# Patient Record
Sex: FEMALE | Race: WHITE | NOT HISPANIC OR LATINO | Employment: OTHER | URBAN - METROPOLITAN AREA
[De-identification: names, ages, dates, MRNs, and addresses within clinical notes are randomized per-mention and may not be internally consistent; named-entity substitution may affect disease eponyms.]

---

## 2017-01-06 ENCOUNTER — GENERIC CONVERSION - ENCOUNTER (OUTPATIENT)
Dept: OTHER | Facility: OTHER | Age: 52
End: 2017-01-06

## 2017-02-13 PROCEDURE — G0143 SCR C/V CYTO,THINLAYER,RESCR: HCPCS | Performed by: OBSTETRICS & GYNECOLOGY

## 2017-02-14 ENCOUNTER — LAB REQUISITION (OUTPATIENT)
Dept: LAB | Facility: HOSPITAL | Age: 52
End: 2017-02-14
Payer: COMMERCIAL

## 2017-02-14 DIAGNOSIS — Z01.419 ENCOUNTER FOR GYNECOLOGICAL EXAMINATION WITHOUT ABNORMAL FINDING: ICD-10-CM

## 2017-02-14 DIAGNOSIS — Z11.51 ENCOUNTER FOR SCREENING FOR HUMAN PAPILLOMAVIRUS (HPV): ICD-10-CM

## 2017-02-17 LAB
LAB AP GYN PRIMARY INTERPRETATION: NORMAL
LAB AP LMP: NORMAL
Lab: NORMAL

## 2017-08-08 ENCOUNTER — APPOINTMENT (OUTPATIENT)
Dept: RADIOLOGY | Age: 52
End: 2017-08-08
Payer: COMMERCIAL

## 2017-08-08 ENCOUNTER — ALLSCRIPTS OFFICE VISIT (OUTPATIENT)
Dept: OTHER | Facility: OTHER | Age: 52
End: 2017-08-08

## 2017-08-08 ENCOUNTER — TRANSCRIBE ORDERS (OUTPATIENT)
Dept: ADMINISTRATIVE | Age: 52
End: 2017-08-08

## 2017-08-08 DIAGNOSIS — M25.561 PAIN IN RIGHT KNEE: ICD-10-CM

## 2017-08-08 DIAGNOSIS — R63.5 ABNORMAL WEIGHT GAIN: ICD-10-CM

## 2017-08-08 DIAGNOSIS — R53.83 OTHER FATIGUE: ICD-10-CM

## 2017-08-08 PROCEDURE — 73564 X-RAY EXAM KNEE 4 OR MORE: CPT

## 2017-09-06 ENCOUNTER — ALLSCRIPTS OFFICE VISIT (OUTPATIENT)
Dept: OTHER | Facility: OTHER | Age: 52
End: 2017-09-06

## 2017-09-09 ENCOUNTER — APPOINTMENT (OUTPATIENT)
Dept: LAB | Facility: MEDICAL CENTER | Age: 52
End: 2017-09-09
Payer: COMMERCIAL

## 2017-09-09 DIAGNOSIS — R53.83 OTHER FATIGUE: ICD-10-CM

## 2017-09-09 DIAGNOSIS — R63.5 ABNORMAL WEIGHT GAIN: ICD-10-CM

## 2017-09-09 LAB
ALBUMIN SERPL BCP-MCNC: 3.8 G/DL (ref 3.5–5)
ALP SERPL-CCNC: 66 U/L (ref 46–116)
ALT SERPL W P-5'-P-CCNC: 19 U/L (ref 12–78)
ANION GAP SERPL CALCULATED.3IONS-SCNC: 4 MMOL/L (ref 4–13)
AST SERPL W P-5'-P-CCNC: 21 U/L (ref 5–45)
BASOPHILS # BLD AUTO: 0.05 THOUSANDS/ΜL (ref 0–0.1)
BASOPHILS NFR BLD AUTO: 1 % (ref 0–1)
BILIRUB SERPL-MCNC: 0.46 MG/DL (ref 0.2–1)
BUN SERPL-MCNC: 16 MG/DL (ref 5–25)
CALCIUM SERPL-MCNC: 9.2 MG/DL (ref 8.3–10.1)
CHLORIDE SERPL-SCNC: 106 MMOL/L (ref 100–108)
CHOLEST SERPL-MCNC: 227 MG/DL (ref 50–200)
CO2 SERPL-SCNC: 29 MMOL/L (ref 21–32)
CREAT SERPL-MCNC: 0.75 MG/DL (ref 0.6–1.3)
EOSINOPHIL # BLD AUTO: 0.25 THOUSAND/ΜL (ref 0–0.61)
EOSINOPHIL NFR BLD AUTO: 5 % (ref 0–6)
ERYTHROCYTE [DISTWIDTH] IN BLOOD BY AUTOMATED COUNT: 12.6 % (ref 11.6–15.1)
GFR SERPL CREATININE-BSD FRML MDRD: 93 ML/MIN/1.73SQ M
GLUCOSE P FAST SERPL-MCNC: 91 MG/DL (ref 65–99)
HCT VFR BLD AUTO: 40.9 % (ref 34.8–46.1)
HDLC SERPL-MCNC: 70 MG/DL (ref 40–60)
HGB BLD-MCNC: 13.5 G/DL (ref 11.5–15.4)
LDLC SERPL CALC-MCNC: 137 MG/DL (ref 0–100)
LYMPHOCYTES # BLD AUTO: 1.67 THOUSANDS/ΜL (ref 0.6–4.47)
LYMPHOCYTES NFR BLD AUTO: 32 % (ref 14–44)
MCH RBC QN AUTO: 31.6 PG (ref 26.8–34.3)
MCHC RBC AUTO-ENTMCNC: 33 G/DL (ref 31.4–37.4)
MCV RBC AUTO: 96 FL (ref 82–98)
MONOCYTES # BLD AUTO: 0.45 THOUSAND/ΜL (ref 0.17–1.22)
MONOCYTES NFR BLD AUTO: 9 % (ref 4–12)
NEUTROPHILS # BLD AUTO: 2.77 THOUSANDS/ΜL (ref 1.85–7.62)
NEUTS SEG NFR BLD AUTO: 53 % (ref 43–75)
NRBC BLD AUTO-RTO: 0 /100 WBCS
PLATELET # BLD AUTO: 287 THOUSANDS/UL (ref 149–390)
PMV BLD AUTO: 9.4 FL (ref 8.9–12.7)
POTASSIUM SERPL-SCNC: 4.3 MMOL/L (ref 3.5–5.3)
PROT SERPL-MCNC: 7.4 G/DL (ref 6.4–8.2)
RBC # BLD AUTO: 4.27 MILLION/UL (ref 3.81–5.12)
SODIUM SERPL-SCNC: 139 MMOL/L (ref 136–145)
T4 FREE SERPL-MCNC: 0.76 NG/DL (ref 0.76–1.46)
TRIGL SERPL-MCNC: 99 MG/DL
TSH SERPL DL<=0.05 MIU/L-ACNC: 0.83 UIU/ML (ref 0.36–3.74)
WBC # BLD AUTO: 5.21 THOUSAND/UL (ref 4.31–10.16)

## 2017-09-09 PROCEDURE — 84439 ASSAY OF FREE THYROXINE: CPT

## 2017-09-09 PROCEDURE — 80061 LIPID PANEL: CPT

## 2017-09-09 PROCEDURE — 85025 COMPLETE CBC W/AUTO DIFF WBC: CPT

## 2017-09-09 PROCEDURE — 80053 COMPREHEN METABOLIC PANEL: CPT

## 2017-09-09 PROCEDURE — 84443 ASSAY THYROID STIM HORMONE: CPT

## 2017-09-09 PROCEDURE — 36415 COLL VENOUS BLD VENIPUNCTURE: CPT

## 2017-10-25 NOTE — PROGRESS NOTES
Assessment  1  Hot flashes (782 62) (R23 2)   2  Perineal irritation (709 9) (L98 9)   3  Perimenopause (627 2) (N95 1)    Plan  Hot flashes, Vaginal dryness    · Norethindrone-Eth Estradiol 0 5-2 5 MG-MCG Oral Tablet (Femhrt Low Dose); TAKE  1 TABLET DAILY AS DIRECTED   Rx By: Kevon Kent; Dispense: 28 Days ; #:28 Tablet; Refill: 2;For: Hot flashes, Vaginal dryness; RAYNA = N; Verified Transmission to [address] [place] [address]; Last Updated By: System, Auto Secure; 2017 1:32:02 PM  Other fatigue, Weight gain    · (1) CBC/PLT/DIFF; Status:Active; Requested [id number];    Perform:[facility]; XYY:[id number]; Ordered;For:Other fatigue, Weight gain; Ordered By:Brittney Green;   · (1) COMPREHENSIVE METABOLIC PANEL; Status:Active; Requested AWO:[id number];    Perform:[facility]; [id number]; Ordered;For:Other fatigue, Weight gain; Ordered By:Brittney Green;   · (1) LIPID PANEL, FASTING; Status:Active; Requested JENNY:[id number];    Perform:[facility]; QOF:[id number]; Ordered;For:Other fatigue, Weight gain; Ordered By:Brittney Green;   · (1) T4, FREE; Status:Active; Requested BKC:[id number];    Perform:[facility]; MPX:[id number]; Ordered;For:Other fatigue, Weight gain; Ordered By:Brittney Green;   · (1) TSH; Status:Active; Requested NMZ:[id number];    Perform:[facility]; MZQ:[id number]; Ordered;For:Other fatigue, Weight gain; Ordered By:Brittney Green; Perineal irritation    · Clobetasol Propionate 0 05 % External Ointment; APPLY AND GENTLY MASSAGE  INTO AFFECTED AREA(S) TWICE DAILY   Rx By: Lund Chess; Dispense: 30 Days ; #:1 X 45 GM Tube; Refill: 2;For: Perineal irritation; RAYNA = N; Print Rx    Discussion/Summary  Discussion Summary:   Reviewed options with patient including OTC remedies, dietary changes, and HRT  Patient most interested in HRT   Reviewed clotting risk and signs and symptoms of pulmonary embolism, deep vein thrombosis, myocardial infarction, and stroke  Reviewed risks of breast cancer with patient  Reviewed recommendation of starting lowest dosage for shortest duration possible  Will plan to start Femhrt daily  Will RTO in 3 months to see how she is tolerating  Call office sooner if having any issues  HRT will help with vaginal dryness but needs to allow 3 months for tissues to rebuild to notice difference can use OTC lubricants or coconut oil in meantime  Will have routine blood work done as well  with patient difficult to diagnose anal and perineal itching without doing an exam, patient declines  Reviewed common causes, can trial Clobetasol ointment to see if relieves  Script printed if patient would like to try  Recommend follow up with proctologist or colorectal if continues  Counseling Documentation With Imm: The patient was counseled regarding instructions for management,-- risks and benefits of treatment options,-- importance of compliance with treatment  total time of encounter was 30 minutes-- and-- 20 minutes was spent counseling  GYN Discussion and Summary:           Menopause--  Goals and Barriers: The patient has the current Goals: Start [address] work done  The patent has the current Barriers: Hot flashes, vaginal dryness, weight gain  Patient's Capacity to Self-Care: Patient is able to Self-Care  Medication SE Review and Pt Understands Tx: Possible side effects of new medications were reviewed with the patient/guardian today  The treatment plan was reviewed with the patient/guardian  The patient/guardian understands and agrees with the treatment plan   Self Referrals:   Self Referrals: Yes      History of Present Illness  HPI: 49 yo seen for menopause symptoms  LMP 2/2017 reports prior to that did not have menses for 7 months  Patient had minor spotting this past summer  Reports hot flashes have increased significantly, mostly at night, occurring daily   Patient has tried OTC remedies such as black cohash without relief  Also experiencing weight gain, patient is very active and has been very frustrated that she continues to gain weight  Reports vaginal dryness and pain with intercourse over the last year, has been using KY jelly which helps  Denies bowel or bladder issues  also c/o anal and perineal itching, has had for a few years  Dermatologist has prescribed cream at one time  No pain with bowel movement or constipation  No vulvar itching  Has tried OTC hydrocortisone without much relief  Refuses exam today to evaluate  currently on Citalopram for anxiety  Review of Systems   Female ROS: no pelvic pain,-- no pelvic pressure,-- no vaginal pain,-- no vaginal discharge,-- no vaginal itching,-- no vaginal odor,-- no nonmenstrual bleeding,-- no vulvar pain,-- no vulvar itching,-- no vulvar lump or mass,-- no labial swelling,-- no dysmenorrhea,-- denied amenorrhea,-- no menorrhagia,-- no hypomenorrhea,-- no oligomenorrhea,-- no decreased time between periods,-- periods are regular,-- regular length of periods,-- no dysuria,-- no bladder pain,-- no hematuria,-- no burning sensation during urination,-- no change in urinary frequency,-- no feelings of urinary urgency,-- no flank pain,-- no abnormal urethral discharge,-- urine is not foul-smelling,-- no urinary hesitancy-- and-- no urinary loss of control--    no vaginal lump or mass  Focused-Female:   Constitutional: recent weight gain, but-- No fever, no chills, feels well, no tiredness, no recent weight gain or loss  ENT: no ear ache, no loss of hearing, no nosebleeds or nasal discharge, no sore throat or hoarseness  Cardiovascular: no complaints of slow or fast heart rate, no chest pain, no palpitations, no leg claudication or lower extremity edema  Respiratory: no complaints of shortness of breath, no wheezing, no dyspnea on exertion, no orthopnea or PND     Breasts: no complaints of breast pain, breast lump or nipple discharge  Gastrointestinal: no complaints of abdominal pain, no constipation, no nausea or diarrhea, no vomiting, no bloody stools  Genitourinary: no complaints of dysuria, no incontinence, no pelvic pain, no dysmenorrhea, no vaginal discharge or abnormal vaginal bleeding  Musculoskeletal: no complaints of arthralgia, no myalgia, no joint swelling or stiffness, no limb pain or swelling  Integumentary: no complaints of skin rash or lesion, no itching or dry skin, no skin wounds  Neurological: no complaints of headache, no confusion, no numbness or tingling, no dizziness or fainting  Active Problems  1  Acute sinusitis (461 9) (J01 90)   2  Allergic rhinitis (477 9) (J30 9)   3  Anxiety disorder (300 00) (F41 9)   4  Bulge of cervical disc without myelopathy (722 0) (M50 20)   5  Cervical paraspinal muscle spasm (728 85) (M62 838)   6  Cervicalgia (723 1) (M54 2)   7  Ecchymoses, spontaneous (782 7) (R23 3)   8  Esophageal reflux (530 81) (K21 9)   9  Fever (780 60) (R50 9)   10  Herniated cervical disc (722 0) (M50 20)   11  Hot flashes (782 62) (R23 2)   12  Influenza (487 1) (J11 1)   13  Lower back pain (724 2) (M54 5)   14  Myofascial pain syndrome (729 1) (M79 1)   15  Need for prophylactic vaccination and inoculation against influenza (V04 81) (Z23)   16  Other fatigue (780 79) (R53 83)   17  Perimenopause (627 2) (N95 1)   18  Perineal irritation (709 9) (L98 9)   19  Pruritus (698 9) (L29 9)   20  Reactive airway disease (493 90) (J45 909)   21  Right knee pain (719 46) (M25 561)   22  Seasonal allergies (477 9) (J30 2)   23  Thyroiditis (245 9) (E06 9)   24  Tracheitis (464 10) (J04 10)   25  Urinary tract infection (599 0) (N39 0)   26  Vaginal dryness (625 8) (N89 8)   27  Weight gain (783 1) (R63 5)    Past Medical History  1  History of Acute otitis media, unspecified laterality   2  Anxiety disorder (300 00) (F41 9)   3  History of Backache (724 5) (M54 9)   4   History of arthritis (V13 4) (Z87 39)   5  History of asthma (V12 69) (Z87 09)   6  History of esophageal reflux (V12 79) (Z87 19)   7  Need for prophylactic vaccination and inoculation against influenza (V04 81) (Z23)    Surgical History  1  History of Breast Surgery Lumpectomy   2  History of Tonsillectomy    Family History  Mother    1  Family history of breast cancer in female (V16 3) (Z80 3)  Father    2  Family history of scleroderma (V19 8) (Z82 69)  Maternal Grandmother    3  Family history of breast cancer in female (V16 3) (Z80 3)  Unknown    4  Family history of Back disorder   5  Family history of malignant neoplasm of breast (V16 3) (Z80 3)    Social History   · Alcohol Use (History)   · Daily Coffee Consumption (___ Cups/Day)   · Daily Cola Consumption (___ Cans/Day)   · Daily Tea Consumption (___ Cups/Day)   · Never A Smoker    Current Meds   1  ALPRAZolam 0 5 MG Oral Tablet; TAKE 1 TABLET AT BEDTIME AS NEEDED; Therapy: 00MXF7874 to ([place])  Requested for: 95Kjk9409; Last   Rx:88Lba0079 Ordered   2  Citalopram Hydrobromide 40 MG Oral Tablet; Take 1 tablet daily; Therapy: 88WJQ6072 to ([facility])  Requested for: 57UTM6324; Last   Rx:00Uqf8585 Ordered   3  Meloxicam 15 MG Oral Tablet; TAKE 1 TABLET DAILY WITH FOOD; Therapy: 39Enp8675 to (Evaluate:07Oct2017)  Requested for: 78Lji8083; Last   Rx:47Bjr5061 Ordered   4  Montelukast Sodium 10 MG Oral Tablet; TAKE 1 TABLET DAILY; Therapy: 61CFD9736 to (Renew:01Oct2017)  Requested for: 18STX2013; Last   Rx:90Jwg7608 Ordered   5  NexIUM 40 MG Oral Capsule Delayed Release; TAKE ONE CAPSULE BY MOUTH EVERY   DAY; Therapy: 21Jan2012 to (Renew:21Nov2017)  Requested for: 77JDR4473; Last   Rx:02Xxd9994 Ordered   6  Vitamin D 2000 UNIT Oral Tablet; Take 1 tablet daily Recorded   7  Zyrtec 10 MG TABS; Therapy: (Recorded:17Ukm9427) to Recorded    Allergies  1   No Known Drug Allergies    Vitals  Vital Signs    Recorded: 14VIW3681 62:40YG   Systolic 713, LUE, Sitting   Diastolic 78, LUE, Sitting   Height 5 ft 2 in   Weight 131 lb    BMI Calculated 23 96   BSA Calculated 1 6     Physical Exam    Constitutional   General appearance: No acute distress, well appearing and well nourished  Neck   Neck: Normal, supple, trachea midline, no masses  Thyroid: Normal, no thyromegaly  Pulmonary   Respiratory effort: No increased work of breathing or signs of respiratory distress  Auscultation of lungs: Clear to auscultation  Cardiovascular   Auscultation of heart: Normal rate and rhythm, normal S1 and S2, no murmurs  Peripheral vascular exam: Normal pulses Throughout  Skin   Skin and subcutaneous tissue: Normal skin turgor and no rashes  Palpation of skin and subcutaneous tissue: Normal     Psychiatric   Orientation to person, place, and time: Normal     Mood and affect: Normal        Attending Note  Collaborating Physician Note: Collaborating Physician: I discussed the case with the Advanced Practitioner and reviewed the note-- and-- I agree with the Advanced Practitioner note        Future Appointments    Date/Time Provider Specialty Site   12/06/2017 01:00 PM Maribel Green, [facility]     Signatures   Electronically signed by : Jazmine Brown, [facility]; Sep  6 2017  7:03PM EST                       (Author)    Electronically signed by : Collette Johnson MD; Sep  7 2017  9:13AM EST                       (Author)

## 2017-11-15 ENCOUNTER — ALLSCRIPTS OFFICE VISIT (OUTPATIENT)
Dept: OTHER | Facility: OTHER | Age: 52
End: 2017-11-15

## 2017-11-15 DIAGNOSIS — R14.0 ABDOMINAL DISTENSION (GASEOUS): ICD-10-CM

## 2017-11-16 ENCOUNTER — LAB CONVERSION - ENCOUNTER (OUTPATIENT)
Dept: OTHER | Facility: OTHER | Age: 52
End: 2017-11-16

## 2017-11-16 LAB — GLIADIN IGA ANTIBODIES (HISTORICAL): 3 UNITS

## 2017-11-18 NOTE — PROGRESS NOTES
Assessment    1  Bloating (787 3) (R14 0)   2  Esophageal reflux (530 81) (K21 9)    Plan   Bloating    · (LC) Antigliadin Abs, IgG; Status:Active; Requested for:55Cjr4649;    · Kir[address]ly U  23 ; Status:Active; Requested for:24Ucj1663;   Esophageal reflux    · Pantoprazole Sodium 40 MG Oral Tablet Delayed Release; TAKE 1 TABLET DAILY   · (LC) H  PYLORI,IGG/IGA ABS; Status:Active; Requested for:68Sfw9121;   US GALLBLADDER; Status:Hold For - Scheduling,Retrospective Authorization; Requested for:76Pyq9832;  Perform:[facility]; GOX:74EMR0000; Last Updated By:Itzel Tarango; 11/15/2017 3:17:35 PM;Ordered; For:Bloating; Ordered By:Mihaela West; Discussion/Summary    I will contact patiient with resultsof her testing and she should call in 1 week with an update regarding her response to the medications  Possible side effects of new medications were reviewed with the patient/guardian today  The treatment plan was reviewed with the patient/guardian  The patient/guardian understands and agrees with the treatment plan      Chief Complaint  c/o belching, pain R upper shoulder pain x 5 days      History of Present Illness  HPI: The patient presents complaining of bloating, GERD despite omeprazole therapy   She experiences breakthrough reflux at least twice a week or more but over-the-counter Tums or antacids do not relieve her symptoms  Review of Systems   Constitutional: No fever, no chills, feels well, no tiredness, no recent weight gain or loss  ENT: no ear ache, no loss of hearing, no nosebleeds or nasal discharge, no sore throat or hoarseness  Cardiovascular: no complaints of slow or fast heart rate, no chest pain, no palpitations, no leg claudication or lower extremity edema  Respiratory: no complaints of shortness of breath, no wheezing, no dyspnea on exertion, no orthopnea or PND  Breasts: no complaints of breast pain, breast lump or nipple discharge    Gastrointestinal: +Gerd, bloaing, but-- no complaints of abdominal pain, no constipation, no nausea or diarrhea, no vomiting, no bloody stools  Genitourinary: no complaints of dysuria, no incontinence, no pelvic pain, no dysmenorrhea, no vaginal discharge or abnormal vaginal bleeding  Musculoskeletal: no complaints of arthralgia, no myalgia, no joint swelling or stiffness, no limb pain or swelling  Integumentary: no complaints of skin rash or lesion, no itching or dry skin, no skin wounds  Neurological: no complaints of headache, no confusion, no numbness or tingling, no dizziness or fainting  Active Problems    1  Acute sinusitis (461 9) (J01 90)   2  Allergic rhinitis (477 9) (J30 9)   3  Anxiety disorder (300 00) (F41 9)   4  Bulge of cervical disc without myelopathy (722 0) (M50 20)   5  Cervical paraspinal muscle spasm (728 85) (M62 838)   6  Cervicalgia (723 1) (M54 2)   7  Ecchymoses, spontaneous (782 7) (R23 3)   8  Esophageal reflux (530 81) (K21 9)   9  Fever (780 60) (R50 9)   10  Herniated cervical disc (722 0) (M50 20)   11  Hot flashes (782 62) (R23 2)   12  Influenza (487 1) (J11 1)   13  Lower back pain (724 2) (M54 5)   14  Myofascial pain syndrome (729 1) (M79 1)   15  Need for prophylactic vaccination and inoculation against influenza (V04 81) (Z23)   16  Other fatigue (780 79) (R53 83)   17  Perimenopause (627 2) (N95 1)   18  Perineal irritation (709 9) (L98 9)   19  Pruritus (698 9) (L29 9)   20  Reactive airway disease (493 90) (J45 909)   21  Right knee pain (719 46) (M25 561)   22  Seasonal allergies (477 9) (J30 2)   23  Thyroiditis (245 9) (E06 9)   24  Tracheitis (464 10) (J04 10)   25  Urinary tract infection (599 0) (N39 0)   26  Vaginal dryness (625 8) (N89 8)   27  Weight gain (783 1) (R63 5)    Past Medical History  1  History of Acute otitis media, unspecified laterality   2  Anxiety disorder (300 00) (F41 9)   3  History of Backache (724 5) (M54 9)   4  History of arthritis (V13 4) (Z87 39)   5  History of asthma (V12 69) (Z87 09)   6  History of esophageal reflux (V12 79) (Z87 19)   7  Need for prophylactic vaccination and inoculation against influenza (V04 81) (Z23)  Active Problems And Past Medical History Reviewed: The active problems and past medical history were reviewed and updated today  Family History  Mother    1  Family history of breast cancer in female (V16 3) (Z80 3)  Father    2  Family history of scleroderma (V19 8) (Z82 69)  Maternal Grandmother    3  Family history of breast cancer in female (V16 3) (Z80 3)  Unknown    4  Family history of Back disorder   5  Family history of malignant neoplasm of breast (V16 3) (Z80 3)  Family History Reviewed: The family history was reviewed and updated today  Social History   · Alcohol Use (History)   · Daily Coffee Consumption (___ Cups/Day)   · Daily Cola Consumption (___ Cans/Day)   · Daily Tea Consumption (___ Cups/Day)   · Never A Smoker  The social history was reviewed and updated today  The social history was reviewed and is unchanged  Surgical History    1  History of Breast Surgery Lumpectomy   2  History of Tonsillectomy  Surgical History Reviewed: The surgical history was reviewed and updated today  Current Meds   1  ALPRAZolam 0 5 MG Oral Tablet; TAKE 1 TABLET AT BEDTIME AS NEEDED; Therapy: 87BIU8689 to (Eloy Yoon)  Requested for: 87Kcb8580; Last Rx:05Sep2017 Ordered   2  Citalopram Hydrobromide 40 MG Oral Tablet; Take 1 tablet daily; Therapy: 64EOC6708 to (Jojo Plunkett)  Requested for: 27USB1053; Last Rx:93Svk0002 Ordered   3  Clobetasol Propionate 0 05 % External Ointment; APPLY AND GENTLY MASSAGE INTO AFFECTED AREA(S) TWICE DAILY; Therapy: 92MTZ6680 to (Evaluate:61Jvd7125); Last Rx:44Abd3165 Ordered   4  Montelukast Sodium 10 MG Oral Tablet; TAKE 1 TABLET DAILY; Therapy: 22PYS4068 to (Cinthia Clark)  Requested for: 34SIL6552; Last Rx:00Dbz4435 Ordered   5   Norethindrone-Eth Estradiol 0 5-2 5 MG-MCG Oral Tablet; TAKE 1 TABLET DAILY AS DIRECTED; Therapy: 08JTP6485 to (Catherine Russ)  Requested for: 96JXU8170; Last Rx:35Ilq2478 Ordered   6  Vitamin D 2000 UNIT Oral Tablet; Take 1 tablet daily Recorded   7  Zyrtec 10 MG TABS; Therapy: (Recorded:62Imz5406) to Recorded    The medication list was reviewed and updated today  Allergies  1  No Known Drug Allergies    Vitals   Recorded: 36PFB4192 02:56PM   Temperature 99 6 F   Heart Rate 64   Systolic 98   Diastolic 62   Height 5 ft 2 in   Weight 129 lb    BMI Calculated 23 59   BSA Calculated 1 59       Physical Exam   Constitutional  General appearance: No acute distress, well appearing and well nourished  Eyes  Conjunctiva and lids: No swelling, erythema or discharge  Pupils and irises: Equal, round and reactive to light  Ears, Nose, Mouth, and Throat  External inspection of ears and nose: Normal    Otoscopic examination: Tympanic membranes translucent with normal light reflex  Canals patent without erythema  Nasal mucosa, septum, and turbinates: Normal without edema or erythema  Oropharynx: Normal with no erythema, edema, exudate or lesions  Pulmonary  Respiratory effort: No increased work of breathing or signs of respiratory distress  Cardiovascular  Auscultation of heart: Normal rate and rhythm, normal S1 and S2, without murmurs  Examination of extremities for edema and/or varicosities: Normal    Carotid pulses: Normal    Abdomen  Abdomen: Non-tender, no masses  Liver and spleen: No hepatomegaly or splenomegaly  Lymphatic  Palpation of lymph nodes in neck: No lymphadenopathy  Musculoskeletal  Digits and nails: Normal without clubbing or cyanosis  Skin  Skin and subcutaneous tissue: Normal without rashes or lesions  Neurologic  Reflexes: 2+ and symmetric     Psychiatric  Orientation to person, place, and time: Normal    Mood and affect: Normal          Future Appointments    Date/Time Provider Specialty Site   12/06/2017 01:00 PM Brendlinger, Jewelene Gosselin, [facility] Obstetrics/Gynecology [facility]       Signatures   Electronically signed by : Cyn Borden DO; Nov 17 2017 11:33AM EST                       (Author)

## 2017-11-20 ENCOUNTER — GENERIC CONVERSION - ENCOUNTER (OUTPATIENT)
Dept: OTHER | Facility: OTHER | Age: 52
End: 2017-11-20

## 2017-11-20 ENCOUNTER — HOSPITAL ENCOUNTER (OUTPATIENT)
Dept: RADIOLOGY | Age: 52
Discharge: HOME | End: 2017-11-20
Payer: COMMERCIAL

## 2017-11-20 DIAGNOSIS — R14.0 ABDOMINAL DISTENSION (GASEOUS): ICD-10-CM

## 2017-11-20 PROCEDURE — 76705 ECHO EXAM OF ABDOMEN: CPT

## 2017-11-21 ENCOUNTER — TRANSCRIBE ORDERS (OUTPATIENT)
Dept: ADMINISTRATIVE | Facility: HOSPITAL | Age: 52
End: 2017-11-21

## 2017-11-21 DIAGNOSIS — K82.8 ADHESION OF GALLBLADDER: Primary | ICD-10-CM

## 2017-12-06 ENCOUNTER — ALLSCRIPTS OFFICE VISIT (OUTPATIENT)
Dept: OTHER | Facility: OTHER | Age: 52
End: 2017-12-06

## 2017-12-15 NOTE — PROGRESS NOTES
Assessment  1  Hot flashes (437 13) (R23 2)    Plan  Hot flashes, Vaginal dryness    · Norethindrone-Eth Estradiol 0 5-2 5 MG-MCG Oral Tablet (Femhrt Low Dose);TAKE 1 TABLET DAILY AS DIRECTED   Rx By: Guillermo Jacob; Dispense: 28 Days ; #:28 Tablet; Refill: 11; For: Hot flashes, Vaginal dryness; RAYNA = N; Verified Transmission to [address] [place] [address]; Last Updated By: System, [employer]; 12/6/2017 1:25:13 PM    Discussion/Summary  Discussion Summary:   Patient doing well will continue FemHRT low dose (0 5/2 5)  Patient to call office if having any issues  Counseling Documentation With Imm: The patient was counseled regarding risks and benefits of treatment options,-- importance of compliance with treatment  GYN Discussion and Summary:              Hormone Replacement--  Goals and Barriers: The patient has the current Goals: Continue FemHrt Low dose  The patent has the current Barriers: NONE  Patient's Capacity to Self-Care: Patient is able to Self-Care  Patient Education: Educational resources provided: Reviewed hormones recommended lowest dose possible for shortest duration possible secondary to increased risk of malignancies as well as heart disease and clotting  Medication SE Review and Pt Understands Tx: Possible side effects of new medications were reviewed with the patient/guardian today  The treatment plan was reviewed with the patient/guardian  The patient/guardian understands and agrees with the treatment plan      History of Present Illness  HPI: 24-year-old seen for hormone replacement therapy follow-up  Patient started [facility] in 9/2017  Reports hot flashes have improved dramatically and only having 1-2 night sweats per night  Reports mild headache may be the 1st month or so on it but it has improved  Patient's last menstrual period was February 2017 reports did have light spotting last month but admits to possibly missing a pill as well during that time        Review of Systems   Female ROS: no pelvic pain,-- no pelvic pressure,-- no vaginal pain,-- no vaginal discharge,-- no vaginal itching,-- no vaginal lump or mass,-- no vaginal odor,-- no nonmenstrual bleeding,-- no vulvar pain,-- no vulvar itching,-- no vulvar lump or mass,-- no labial swelling,-- no dysmenorrhea,-- denied amenorrhea,-- no menorrhagia,-- no hypomenorrhea,-- no oligomenorrhea,-- no decreased time between periods,-- periods are regular,-- regular length of periods,-- no dysuria,-- no bladder pain,-- no hematuria,-- no burning sensation during urination,-- no change in urinary frequency,-- no feelings of urinary urgency,-- no flank pain,-- no abnormal urethral discharge,-- urine is not foul-smelling,-- no urinary hesitancy-- and-- no urinary loss of control  Focused-Female:  Constitutional: recent weight gain, but-- No fever, no chills, feels well, no tiredness, no recent weight gain or loss  ENT: no ear ache, no loss of hearing, no nosebleeds or nasal discharge, no sore throat or hoarseness  Cardiovascular: no complaints of slow or fast heart rate, no chest pain, no palpitations, no leg claudication or lower extremity edema  Respiratory: no complaints of shortness of breath, no wheezing, no dyspnea on exertion, no orthopnea or PND  Breasts: no complaints of breast pain, breast lump or nipple discharge  Gastrointestinal: no complaints of abdominal pain, no constipation, no nausea or diarrhea, no vomiting, no bloody stools  Genitourinary: no complaints of dysuria, no incontinence, no pelvic pain, no dysmenorrhea, no vaginal discharge or abnormal vaginal bleeding  Musculoskeletal: no complaints of arthralgia, no myalgia, no joint swelling or stiffness, no limb pain or swelling  Integumentary: no complaints of skin rash or lesion, no itching or dry skin, no skin wounds  Neurological: no complaints of headache, no confusion, no numbness or tingling, no dizziness or fainting  Active Problems  1  Acute sinusitis (461 9) (J01 90)   2  Allergic rhinitis (477 9) (J30 9)   3  Anxiety disorder (300 00) (F41 9)   4  Biliary dyskinesia (575 8) (K82 8)   5  Bloating (787 3) (R14 0)   6  Bulge of cervical disc without myelopathy (722 0) (M50 20)   7  Cervical paraspinal muscle spasm (728 85) (M62 838)   8  Cervicalgia (723 1) (M54 2)   9  Ecchymoses, spontaneous (782 7) (R23 3)   10  Esophageal reflux (530 81) (K21 9)   11  Fever (780 60) (R50 9)   12  Herniated cervical disc (722 0) (M50 20)   13  Hot flashes (782 62) (R23 2)   14  Influenza (487 1) (J11 1)   15  Lower back pain (724 2) (M54 5)   16  Myofascial pain syndrome (729 1) (M79 1)   17  Need for prophylactic vaccination and inoculation against influenza (V04 81) (Z23)   18  Other fatigue (780 79) (R53 83)   19  Perimenopause (627 2) (N95 1)   20  Perineal irritation (709 9) (L98 9)   21  Pruritus (698 9) (L29 9)   22  Reactive airway disease (493 90) (J45 909)   23  Right knee pain (719 46) (M25 561)   24  Seasonal allergies (477 9) (J30 2)   25  Thyroiditis (245 9) (E06 9)   26  Tracheitis (464 10) (J04 10)   27  Urinary tract infection (599 0) (N39 0)   28  Vaginal dryness (625 8) (N89 8)   29  Weight gain (783 1) (R63 5)    Past Medical History  1  History of Acute otitis media, unspecified laterality   2  Anxiety disorder (300 00) (F41 9)   3  History of Backache (724 5) (M54 9)   4  History of arthritis (V13 4) (Z87 39)   5  History of asthma (V12 69) (Z87 09)   6  History of esophageal reflux (V12 79) (Z87 19)   7  Need for prophylactic vaccination and inoculation against influenza (V04 81) (Z23)    Surgical History  1  History of Breast Surgery Lumpectomy   2  History of Tonsillectomy    Family History  Mother    1  Family history of breast cancer in female (V16 3) (Z80 3)  Father    2  Family history of scleroderma (V19 8) (Z82 69)  Maternal Grandmother    3  Family history of breast cancer in female (V16 3) (Z80 3)  Unknown    4   Family history of Back disorder   5  Family history of malignant neoplasm of breast (V16 3) (Z80 3)    Social History   · Alcohol Use (History)   · Daily Coffee Consumption (___ Cups/Day)   · Daily Cola Consumption (___ Cans/Day)   · Daily Tea Consumption (___ Cups/Day)   · Never A Smoker    Current Meds   1  ALPRAZolam 0 5 MG Oral Tablet; TAKE 1 TABLET AT BEDTIME AS NEEDED; Therapy: 63MBW5913 to (Jenn Zapata)  Requested for: 58Yom1681; Last Rx:49Twr6771 Ordered   2  Citalopram Hydrobromide 40 MG Oral Tablet; Take 1 tablet daily; Therapy: 52PEI1032 to (Ozzy Figueredo)  Requested for: 32EJG6813; Last Rx:69Sla3199 Ordered   3  Clobetasol Propionate 0 05 % External Ointment; APPLY AND GENTLY MASSAGE INTO AFFECTED AREA(S) TWICE DAILY; Therapy: 26AFF5766 to (Evaluate:03Trc3869); Last Rx:65Wyr8907 Ordered   4  Montelukast Sodium 10 MG Oral Tablet; TAKE 1 TABLET DAILY; Therapy: 90GUE3469 to (Clive Montes)  Requested for: 36PIJ8295; Last Rx:75Csv1553 Ordered   5  Norethindrone-Eth Estradiol 0 5-2 5 MG-MCG Oral Tablet; TAKE 1 TABLET DAILY AS DIRECTED; Therapy: 22YLL0088 to (Kofi Salinas)  Requested for: 49NNW3509; Last Rx:28Sih3190 Ordered   6  Pantoprazole Sodium 40 MG Oral Tablet Delayed Release; TAKE 1 TABLET DAILY; Therapy: 00KNV6884 to (Renew:51Kgn7654)  Requested for: 72VAR4768; Last Rx:18Ujo3103 Ordered   7  Vitamin D 2000 UNIT Oral Tablet; Take 1 tablet daily Recorded   8  Zyrtec 10 MG TABS; Therapy: (Recorded:95Tui4066) to Recorded    Allergies  1  No Known Drug Allergies    Vitals  Vital Signs    Recorded: 55VWJ6209 01:30PM Recorded: 97HPD6703 36:68PG   Systolic 082 977, LUE, Sitting   Diastolic 76 84, LUE, Sitting   Height  5 ft 2 in   Weight  130 lb    BMI Calculated  23 78   BSA Calculated  1 59     Physical Exam   Constitutional  General appearance: No acute distress, well appearing and well nourished  Neck  Neck: Normal, supple, trachea midline, no masses  Thyroid: Normal, no thyromegaly     Pulmonary Respiratory effort: No increased work of breathing or signs of respiratory distress  Auscultation of lungs: Clear to auscultation  Cardiovascular  Auscultation of heart: Normal rate and rhythm, normal S1 and S2, no murmurs  Peripheral vascular exam: Normal pulses Throughout  Psychiatric  Orientation to person, place, and time: Normal    Mood and affect: Normal        Attending Note  Collaborating Physician Note: Collaborating Physician: I discussed the case with the Advanced Practitioner and reviewed the note-- and-- I agree with the Advanced Practitioner note        Future Appointments    Date/Time Provider Specialty Site   02/14/2018 01:00 PM Radha Green, [facility]st[facility]     Signatures   Electronically signed by : Ander Hall, [facility]; Dec 10 2017 11:12PM EST                       (Author)    Electronically signed by : Dora Rodrigues MD; Dec 14 2017 10:52AM EST                       (Author)

## 2017-12-19 ENCOUNTER — HOSPITAL ENCOUNTER (OUTPATIENT)
Dept: RADIOLOGY | Age: 52
Discharge: HOME | End: 2017-12-19
Payer: COMMERCIAL

## 2017-12-19 ENCOUNTER — GENERIC CONVERSION - ENCOUNTER (OUTPATIENT)
Dept: OTHER | Facility: OTHER | Age: 52
End: 2017-12-19

## 2017-12-19 DIAGNOSIS — K82.8 ADHESION OF GALLBLADDER: ICD-10-CM

## 2017-12-19 PROCEDURE — 78227 HEPATOBIL SYST IMAGE W/DRUG: CPT

## 2017-12-19 PROCEDURE — A9552 F18 FDG: HCPCS

## 2017-12-19 RX ADMIN — SINCALIDE 1.2 MCG: 5 INJECTION, POWDER, LYOPHILIZED, FOR SOLUTION INTRAVENOUS at 09:46

## 2017-12-20 ENCOUNTER — GENERIC CONVERSION - ENCOUNTER (OUTPATIENT)
Dept: OTHER | Facility: OTHER | Age: 52
End: 2017-12-20

## 2017-12-27 ENCOUNTER — ALLSCRIPTS OFFICE VISIT (OUTPATIENT)
Dept: OTHER | Facility: OTHER | Age: 52
End: 2017-12-27

## 2017-12-28 NOTE — CONSULTS
Assessment   1  Colon cancer screening (V76 51) (Z12 11)   2  Abdominal pain (789 00) (R10 9)   3  Bloating (787 3) (R14 0)   4  Esophageal reflux (530 81) (K21 9)    Plan   Bloating, Esophageal reflux    · EGD; Status:Hold For - Scheduling; Requested for:50Bne9319;    Perform:[facility]; FOU:[id number]; Ordered; For:Bloating, Esophageal reflux; Ordered By:Everette Jorgensen;  Esophageal reflux    · Sucralfate 1 GM Oral Tablet; TAKE 1 TABLET EVERY 12 HOURS   Rx By: Rita Haynes; Dispense: 30 Days ; #:60 Tablet; Refill: 3;For: Esophageal reflux; RAYNA = N; Verified Transmission to [address] [place] [address]; Last Updated By: SystemAquaBling; 12/27/2017 1:09:53 PM    Discussion/Summary   Discussion Summary: 1  Epigastric pain, bloating and belching: suggestive of dyspepsia, right upper quadrant ultrasound was negative for colonic lithiasis, HIDA scan was negative  CBC with normal hemoglobin, normal TSH, normal comprehensive metabolic panel  Normal gliadin antibody  She does have daily use of NSAIDs due to back pain  I suspect she may have peptic ulcer disease secondary to NSAID use versus H  pylori gastritis  Avoid NSAIDs  Will add Carafate encases component of bile reflux  Continue pantoprazole 40 mg, 30 minutes before dinner as symptoms are predominantly during the evenings  Will schedule EGD for further evaluation  Colon cancer screening average risk, will schedule colonoscopy  Patient was explained about the risks and benefits of the procedure  Risks including but not limited to bleeding, infection, perforation were explained in detail  Also explained about less than 100% sensitivity with the exam and other alternatives  Chief Complaint   Chief Complaint Free Text Note Form: Dyspepsia      History of Present Illness   HPI: 65-year-old female comes in for evaluation of long-standing symptoms of acid reflux which have been getting progressively worse over the past 1-1/2 month   Patient states that she started on pantoprazole 40 mg twice a day which seem to help with the symptoms however she has started taking pantoprazole 40 mg once daily due to insurance issues  She also complains of increased belching but denies dysphagia, odynophagia, hematemesis or melena  She states that she had an EGD and a colonoscopy many years ago, on over the results  She does think that she may have had a polyp  She is due for screening colonoscopy at this time  Review of Systems   Complete-Female GI Adult:      Constitutional: No fever, no chills, feels well, no tiredness, no recent weight gain or weight loss  Eyes: No complaints of eye pain, no red eyes, no eyesight problems, no discharge, no dry eyes, no itching of eyes  ENT: no complaints of earache, no loss of hearing, no nose bleeds, no nasal discharge, no sore throat, no hoarseness  Cardiovascular: No complaints of slow heart rate, no fast heart rate, no chest pain, no palpitations, no leg claudication, no lower extremity edema  Respiratory: No complaints of shortness of breath, no wheezing, no cough, no SOB on exertion, no orthopnea, no PND  Gastrointestinal: as noted in HPI  Genitourinary: No complaints of dysuria, no incontinence, no pelvic pain, no dysmenorrhea, no vaginal discharge or bleeding  Musculoskeletal: No complaints of arthralgias, no myalgias, no joint swelling or stiffness, no limb pain or swelling  Integumentary: No complaints of skin rash or lesions, no itching, no skin wounds, no breast pain or lump  Neurological: No complaints of headache, no confusion, no convulsions, no numbness, no dizziness or fainting, no tingling, no limb weakness, no difficulty walking  Psychiatric: Not suicidal, no sleep disturbance, no anxiety or depression, no change in personality, no emotional problems        Endocrine: No complaints of proptosis, no hot flashes, no muscle weakness, no deepening of the voice, no feelings of weakness  Hematologic/Lymphatic: No complaints of swollen glands, no swollen glands in the neck, does not bleed easily, does not bruise easily  ROS Reviewed:    ROS reviewed  Active Problems   1  Acute sinusitis (461 9) (J01 90)   2  Allergic rhinitis (477 9) (J30 9)   3  Anxiety disorder (300 00) (F41 9)   4  Biliary dyskinesia (575 8) (K82 8)   5  Bloating (787 3) (R14 0)   6  Bulge of cervical disc without myelopathy (722 0) (M50 20)   7  Cervical paraspinal muscle spasm (728 85) (M62 838)   8  Cervicalgia (723 1) (M54 2)   9  Ecchymoses, spontaneous (782 7) (R23 3)   10  Esophageal reflux (530 81) (K21 9)   11  Fever (780 60) (R50 9)   12  Herniated cervical disc (722 0) (M50 20)   13  Hot flashes (782 62) (R23 2)   14  Influenza (487 1) (J11 1)   15  Lower back pain (724 2) (M54 5)   16  Myofascial pain syndrome (729 1) (M79 1)   17  Need for prophylactic vaccination and inoculation against influenza (V04 81) (Z23)   18  Other fatigue (780 79) (R53 83)   19  Perimenopause (627 2) (N95 1)   20  Perineal irritation (709 9) (L98 9)   21  Pruritus (698 9) (L29 9)   22  Reactive airway disease (493 90) (J45 909)   23  Right knee pain (719 46) (M25 561)   24  Seasonal allergies (477 9) (J30 2)   25  Thyroiditis (245 9) (E06 9)   26  Tracheitis (464 10) (J04 10)   27  Urinary tract infection (599 0) (N39 0)   28  Vaginal dryness (625 8) (N89 8)   29  Weight gain (783 1) (R63 5)    Past Medical History   1  History of Acute otitis media, unspecified laterality   2  Anxiety disorder (300 00) (F41 9)   3  History of Backache (724 5) (M54 9)   4  History of arthritis (V13 4) (Z87 39)   5  History of asthma (V12 69) (Z87 09)   6  History of esophageal reflux (V12 79) (Z87 19)   7  Need for prophylactic vaccination and inoculation against influenza (V04 81) (Z23)  Active Problems And Past Medical History Reviewed: The active problems and past medical history were reviewed and updated today  Surgical History   1  History of Breast Surgery Lumpectomy   2  History of Tonsillectomy  Surgical History Reviewed: The surgical history was reviewed and updated today  Family History   Mother    1  Family history of breast cancer in female (V16 3) (Z80 3)  Father    2  Family history of scleroderma (V19 8) (Z82 69)  Maternal Grandmother    3  Family history of breast cancer in female (V16 3) (Z80 3)  Unknown    4  Family history of Back disorder   5  Family history of malignant neoplasm of breast (V16 3) (Z80 3)  Family History Reviewed: The family history was reviewed and updated today  Social History    · Alcohol Use (History)   · Daily Coffee Consumption (___ Cups/Day)   · Daily Cola Consumption (___ Cans/Day)   · Daily Tea Consumption (___ Cups/Day)   · Never A Smoker  Social History Reviewed: The social history was reviewed and updated today  Current Meds    1  ALPRAZolam 0 5 MG Oral Tablet; TAKE 1 TABLET AT BEDTIME AS NEEDED; Therapy: 72JYE7882 to (Musa Donovan)  Requested for: 39Htu7613; Last     Rx:22Ccw6109 Ordered   2  Citalopram Hydrobromide 40 MG Oral Tablet; Take 1 tablet daily; Therapy: 54UCF3711 to (26 941001)  Requested for: 77BIC3456; Last     Rx:62Gqh6673 Ordered   3  Clobetasol Propionate 0 05 % External Ointment; APPLY AND GENTLY MASSAGE INTO     AFFECTED AREA(S) TWICE DAILY; Therapy: 79ICD2848 to (Evaluate:05Ois6220); Last Rx:43Abi4303 Ordered   4  Montelukast Sodium 10 MG Oral Tablet; TAKE 1 TABLET DAILY; Therapy: 53BRS7315 to (Solange Mendoza)  Requested for: 35TCL7927; Last     Rx:67Yea2581 Ordered   5  Norethindrone-Eth Estradiol 0 5-2 5 MG-MCG Oral Tablet; TAKE 1 TABLET DAILY AS     DIRECTED; Therapy: 62JRF3648 to (Musa Donovan)  Requested for: 48DAZ2301; Last     Rx:73Lks9904 Ordered   6  Pantoprazole Sodium 40 MG Oral Tablet Delayed Release; TAKE 1 TABLET DAILY;      Therapy: 08AKZ6343 to (Renew:59Sdc1405)  Requested for: 96PVT3080; Last     Rx:46Toz7088 Ordered   7  Vitamin D 2000 UNIT Oral Tablet; Take 1 tablet daily Recorded   8  Zyrtec 10 MG TABS; Therapy: (Recorded:41Usg9757) to Recorded  Medication List Reviewed: The medication list was reviewed and updated today  Allergies   1  No Known Drug Allergies    Vitals   Vital Signs    Recorded: 05DBG2771 12:41PM   Temperature 98 8 F   Heart Rate 77   Systolic 468   Diastolic 66   Weight 936 lb    BMI Calculated 24 14   BSA Calculated 1 6   O2 Saturation 98     Physical Exam        Constitutional      General appearance: No acute distress, well appearing and well nourished  Eyes      Conjunctiva and lids: No swelling, erythema or discharge  Ears, Nose, Mouth, and Throat      External inspection of ears and nose: Normal        Pulmonary      Respiratory effort: No increased work of breathing or signs of respiratory distress  Auscultation of lungs: Clear to auscultation  Cardiovascular      Palpation of heart: Normal PMI, no thrills  Auscultation of heart: Normal rate and rhythm, normal S1 and S2, without murmurs  Examination of extremities for edema and/or varicosities: Normal        Abdomen      Abdomen: Non-tender, no masses  Liver and spleen: No hepatomegaly or splenomegaly  Lymphatic      Palpation of lymph nodes in neck: No lymphadenopathy  Skin      Skin and subcutaneous tissue: Normal without rashes or lesions  Psychiatric      Orientation to person, place, and time: Normal        Mood and affect: Normal           Results/Data   * NM HEPATOBILIARY W RX [id number] 07:51AM Annamarie Ham      Test Name Result Flag Reference   NM HEPATOBILIARY W RX (Report)     HEPATOBILIARY SCAN WITH CHOLECYSTOKININ ADMINISTRATION            INDICATION: K82 8: Other specified diseases of gallbladder  History taken directly from the electronic ordering system  Upper abdominal pain radiating to the back   Excessive gassiness and bloating  COMPARISON: Ultrasound performed on 11/20/2017  TECHNIQUE:  Following the intravenous administration of 5 5 mCi Tc-99m labeled mebrofenin, dynamic abdominal images were obtained over a 60 minute time period  Images were performed in AP projection  FINDINGS:            There is prompt, uniform accumulation with normal clearance of the radiopharmaceutical by the liver  There is normal filling of the intrahepatic ducts, common bile duct and gallbladder with normal excretion of the radiopharmaceutical into the duodenum  In order to evaluate the contractile response of the gallbladder to cholecystokinin stimulation, 1 2 mcg sincalide (0 02 mcg/kg) was administered by slow intravenous infusion over 60 minutes  These images demonstrate normal contraction of the       gallbladder  The calculated gallbladder ejection fraction is 61 % (N = >35%)  IMPRESSION:           1  Normal hepatobiliary study      2  Normal contractile response of the gallbladder to cholecystokinin infusion  (61%)       The patient experienced mild upper abdominal pain and belching during CCK infusion  Workstation performed: [id number]           Signed by:      Darling Cage MD      12/20/17      US ABDOMEN LIMITED [id number] 09:19AM Rizwan Pretty Order Number: [id number]       - Patient Instructions: To schedule this appointment, please contact Central Scheduling at [phone]  Test Name Result Flag Reference   US ABDOMEN LIMITED (Report)     RIGHT UPPER QUADRANT ULTRASOUND           INDICATION: Abdominal distention and pain           COMPARISON: None  TECHNIQUE:  Real-time ultrasound of the right upper quadrant was performed with a curvilinear transducer with both volumetric sweeps and still imaging techniques  FINDINGS:           PANCREAS: Visualized portions of the pancreas are within normal limits             AORTA AND IVC: Visualized portions are normal for patient age  LIVER:      Size: Within normal range  The liver measures 15 3 cm in the midclavicular line  Contour: Surface contour is smooth  Parenchyma: Echogenicity and echotexture are within normal limits  No evidence of suspicious mass  Limited imaging of the main portal vein shows it to be patent and hepatopetal             BILIARY:      The gallbladder is normal in caliber  No wall thickening or pericholecystic fluid  No stones or sludge identified  No sonographic Neal's sign  No intrahepatic biliary dilatation  CBD measures 4 mm  No choledocholithiasis  KIDNEY:       Right kidney measures 9 9 x 4 0 cm  Within normal limits  ASCITES:  None  IMPRESSION:           Normal right upper quadrant ultrasound                Workstation performed: [id number]           Signed by:      Pamela Agustin MD      11/20/17      (Q) GLIADIN ANTIBODY (IGG) 98HZQ1059 03:43PM [facility]      Test Name Result Flag Reference   GLIADIN ANTIBODY (IGG) 3 Units     Value  Interpretation               -----  --------------               <20    Antibody not detected               >or=20 Antibody detected      (1) CBC/PLT/DIFF [id number] 07:44AM Brittney Green    Order Number: [id number]      Test Name Result Flag Reference   WBC COUNT 5 21 Thousand/uL  4 31-10 16   RBC COUNT 4 27 Million/uL  3 81-5 12   HEMOGLOBIN 13 5 g/dL  11 5-15 4   HEMATOCRIT 40 9 %  34 8-46  1   MCV 96 fL  82-98   MCH 31 6 pg  26 8-34 3   MCHC 33 0 g/dL  31 4-37 4   RDW 12 6 %  11 6-15 1   MPV 9 4 fL  8 9-12 7   PLATELET COUNT 196 Thousands/uL  149-390   nRBC AUTOMATED 0 /100 WBCs     NEUTROPHILS RELATIVE PERCENT 53 %  43-75   LYMPHOCYTES RELATIVE PERCENT 32 %  14-44   MONOCYTES RELATIVE PERCENT 9 %  4-12   EOSINOPHILS RELATIVE PERCENT 5 %  0-6   BASOPHILS RELATIVE PERCENT 1 %  0-1   NEUTROPHILS ABSOLUTE COUNT 2 77 Thousands/? ??L  1 85-7 62   LYMPHOCYTES ABSOLUTE COUNT 1 67 Thousands/? ??L  0 60-4 47   MONOCYTES ABSOLUTE COUNT 0 45 Thousand/? ??L  0 17-1 22   EOSINOPHILS ABSOLUTE COUNT 0 25 Thousand/? ??L  0 00-0 61   BASOPHILS ABSOLUTE COUNT 0 05 Thousands/? ??L  0 00-0 10      (1) COMPREHENSIVE METABOLIC PANEL [id number] 76:47JB Kristofer Green Order Number: [id number]      Test Name Result Flag Reference   SODIUM 139 mmol/L  136-145   POTASSIUM 4 3 mmol/L  3 5-5 3   CHLORIDE 106 mmol/L  100-108   CARBON DIOXIDE 29 mmol/L  21-32   ANION GAP (CALC) 4 mmol/L  4-13   BLOOD UREA NITROGEN 16 mg/dL  5-25   CREATININE 0 75 mg/dL  0 60-1 30   Standardized to IDMS reference method   CALCIUM 9 2 mg/dL  8 3-10 1   BILI, TOTAL 0 46 mg/dL  0 20-1 00   ALK PHOSPHATAS 66 U/L     ALT (SGPT) 19 U/L  12-78   Specimen collection should occur prior to Sulfasalazine and/or Sulfapyridine administration due to the potential for falsely depressed results  AST(SGOT) 21 U/L  5-45   Specimen collection should occur prior to Sulfasalazine administration due to the potential for falsely depressed results  ALBUMIN 3 8 g/dL  3 5-5 0   TOTAL PROTEIN 7 4 g/dL  6 4-8 2   eGFR 93 ml/min/1 73sq m     National Kidney Disease Education Program recommendations are as follows:     GFR calculation is accurate only with a steady state creatinine     Chronic Kidney disease less than 60 ml/min/1 73 sq  meters     Kidney failure less than 15 ml/min/1 73 sq  meters  GLUCOSE FASTING 91 mg/dL  65-99   Specimen collection should occur prior to Sulfasalazine administration due to the potential for falsely depressed results  Specimen collection should occur prior to Sulfapyridine administration due to the potential for falsely elevated results        (1) TSH [id number] 07:44AM Josemarbin Kristoferkerline Frances Order Number: [id number]      Test Name Result Flag Reference   TSH 0 827 uIU/mL  0 358-3 740   Patients undergoing fluorescein dye angiography may retain small amounts of fluorescein in the body for 48-72 hours post procedure  Samples containing fluorescein can produce falsely depressed TSH values  If the patient had this procedure,a specimen should be resubmitted post fluorescein clearance                           The recommended reference ranges for TSH during pregnancy are as follows:     First trimester 0 1 to 2 5 uIU/mL     Second trimester  0 2 to 3 0 uIU/mL     Third trimester 0 3 to 3 0 uIU/m      Future Appointments      Date/Time Provider Specialty Site   02/14/2018 01:00 PM LORI Palma Obstetrics/Gynecology [address] [facility] O[facility]YN     Signatures    Electronically signed by : SAMANTHA Richardson ; Dec 27 2017  1:24PM EST                       (Author)

## 2017-12-29 RX ORDER — ALPRAZOLAM 0.5 MG/1
1 TABLET ORAL
COMMUNITY
Start: 2011-12-30 | End: 2018-01-25 | Stop reason: SDUPTHER

## 2017-12-29 RX ORDER — PANTOPRAZOLE SODIUM 40 MG/1
1 TABLET, DELAYED RELEASE ORAL DAILY
COMMUNITY
Start: 2017-11-15 | End: 2018-05-22 | Stop reason: SDUPTHER

## 2017-12-29 RX ORDER — CETIRIZINE HYDROCHLORIDE 10 MG/1
TABLET ORAL
COMMUNITY

## 2017-12-29 RX ORDER — MELATONIN
1000 DAILY
COMMUNITY
End: 2018-05-22 | Stop reason: DRUGHIGH

## 2017-12-29 RX ORDER — MONTELUKAST SODIUM 10 MG/1
1 TABLET ORAL DAILY
COMMUNITY
Start: 2015-11-09 | End: 2018-05-22 | Stop reason: ALTCHOICE

## 2017-12-29 RX ORDER — CITALOPRAM 40 MG/1
1 TABLET ORAL DAILY
COMMUNITY
Start: 2012-10-02 | End: 2019-02-19

## 2017-12-29 RX ORDER — SUCRALFATE 1 G/1
1 TABLET ORAL 4 TIMES DAILY
COMMUNITY
End: 2019-08-07 | Stop reason: ALTCHOICE

## 2017-12-29 RX ORDER — NORETHINDRONE ACETATE AND ETHINYL ESTRADIOL .5; 2.5 MG/1; UG/1
1 TABLET ORAL DAILY
COMMUNITY
Start: 2017-09-06 | End: 2018-11-11 | Stop reason: SDUPTHER

## 2017-12-29 NOTE — PRE-PROCEDURE INSTRUCTIONS
Pre-Surgery Instructions:   Medication Instructions    ALPRAZolam (XANAX) 0 5 mg tablet Instructed patient per Anesthesia Guidelines   cetirizine (ZYRTEC ALLERGY) 10 mg tablet Instructed patient per Anesthesia Guidelines   cholecalciferol (VITAMIN D3) 1,000 units tablet Instructed patient per Anesthesia Guidelines   citalopram (CeleXA) 40 mg tablet Patient was instructed per "E-Preop"    montelukast (SINGULAIR) 10 mg tablet Instructed patient per Anesthesia Guidelines   norethindrone-ethinyl estradiol (FEMHRT LOW DOSE) 0 5-2 5 MG-MCG per tablet Instructed patient per Anesthesia Guidelines   pantoprazole (PROTONIX) 40 mg tablet Instructed patient per Anesthesia Guidelines   sucralfate (CARAFATE) 1 g tablet Instructed patient per Anesthesia Guidelines     Pre colonoscopy instructions reviewed

## 2018-01-01 ENCOUNTER — ANESTHESIA EVENT (OUTPATIENT)
Dept: PERIOP | Facility: AMBULARY SURGERY CENTER | Age: 53
End: 2018-01-01
Payer: COMMERCIAL

## 2018-01-02 ENCOUNTER — ANESTHESIA (OUTPATIENT)
Dept: PERIOP | Facility: AMBULARY SURGERY CENTER | Age: 53
End: 2018-01-02
Payer: COMMERCIAL

## 2018-01-02 ENCOUNTER — GENERIC CONVERSION - ENCOUNTER (OUTPATIENT)
Dept: GASTROENTEROLOGY | Facility: CLINIC | Age: 53
End: 2018-01-02

## 2018-01-02 ENCOUNTER — HOSPITAL ENCOUNTER (OUTPATIENT)
Facility: AMBULARY SURGERY CENTER | Age: 53
Setting detail: OUTPATIENT SURGERY
Discharge: HOME | End: 2018-01-02
Attending: INTERNAL MEDICINE | Admitting: INTERNAL MEDICINE
Payer: COMMERCIAL

## 2018-01-02 VITALS
HEART RATE: 82 BPM | OXYGEN SATURATION: 97 % | HEIGHT: 62 IN | DIASTOLIC BLOOD PRESSURE: 56 MMHG | RESPIRATION RATE: 18 BRPM | TEMPERATURE: 97.1 F | BODY MASS INDEX: 23.37 KG/M2 | SYSTOLIC BLOOD PRESSURE: 119 MMHG | WEIGHT: 127 LBS

## 2018-01-02 DIAGNOSIS — K21.9 GASTRO-ESOPHAGEAL REFLUX DISEASE WITHOUT ESOPHAGITIS: ICD-10-CM

## 2018-01-02 DIAGNOSIS — Z12.11 ENCOUNTER FOR SCREENING FOR MALIGNANT NEOPLASM OF COLON: ICD-10-CM

## 2018-01-02 DIAGNOSIS — R14.0 ABDOMINAL DISTENSION (GASEOUS): ICD-10-CM

## 2018-01-02 PROCEDURE — 88342 IMHCHEM/IMCYTCHM 1ST ANTB: CPT | Performed by: INTERNAL MEDICINE

## 2018-01-02 PROCEDURE — 88313 SPECIAL STAINS GROUP 2: CPT | Performed by: INTERNAL MEDICINE

## 2018-01-02 PROCEDURE — 88305 TISSUE EXAM BY PATHOLOGIST: CPT | Performed by: INTERNAL MEDICINE

## 2018-01-02 RX ORDER — ONDANSETRON 2 MG/ML
4 INJECTION INTRAMUSCULAR; INTRAVENOUS ONCE
Status: COMPLETED | OUTPATIENT
Start: 2018-01-02 | End: 2018-01-02

## 2018-01-02 RX ORDER — PROPOFOL 10 MG/ML
INJECTION, EMULSION INTRAVENOUS CONTINUOUS PRN
Status: DISCONTINUED | OUTPATIENT
Start: 2018-01-02 | End: 2018-01-02 | Stop reason: SURG

## 2018-01-02 RX ORDER — SODIUM CHLORIDE 9 MG/ML
125 INJECTION, SOLUTION INTRAVENOUS CONTINUOUS
Status: CANCELLED | OUTPATIENT
Start: 2018-01-02

## 2018-01-02 RX ORDER — PROPOFOL 10 MG/ML
INJECTION, EMULSION INTRAVENOUS AS NEEDED
Status: DISCONTINUED | OUTPATIENT
Start: 2018-01-02 | End: 2018-01-02 | Stop reason: SURG

## 2018-01-02 RX ORDER — SODIUM CHLORIDE 9 MG/ML
125 INJECTION, SOLUTION INTRAVENOUS CONTINUOUS
Status: DISCONTINUED | OUTPATIENT
Start: 2018-01-02 | End: 2018-01-02 | Stop reason: HOSPADM

## 2018-01-02 RX ORDER — LIDOCAINE HYDROCHLORIDE 10 MG/ML
INJECTION, SOLUTION INFILTRATION; PERINEURAL AS NEEDED
Status: DISCONTINUED | OUTPATIENT
Start: 2018-01-02 | End: 2018-01-02 | Stop reason: SURG

## 2018-01-02 RX ADMIN — PROPOFOL 40 MG: 10 INJECTION, EMULSION INTRAVENOUS at 11:20

## 2018-01-02 RX ADMIN — PROPOFOL 100 MCG/KG/MIN: 10 INJECTION, EMULSION INTRAVENOUS at 11:26

## 2018-01-02 RX ADMIN — LIDOCAINE HYDROCHLORIDE 80 MG: 10 INJECTION, SOLUTION INFILTRATION; PERINEURAL at 11:11

## 2018-01-02 RX ADMIN — SODIUM CHLORIDE 125 ML/HR: 0.9 INJECTION, SOLUTION INTRAVENOUS at 10:39

## 2018-01-02 RX ADMIN — PROPOFOL 20 MG: 10 INJECTION, EMULSION INTRAVENOUS at 11:22

## 2018-01-02 RX ADMIN — PROPOFOL 20 MG: 10 INJECTION, EMULSION INTRAVENOUS at 11:23

## 2018-01-02 RX ADMIN — PROPOFOL 30 MG: 10 INJECTION, EMULSION INTRAVENOUS at 11:16

## 2018-01-02 RX ADMIN — PROPOFOL 30 MG: 10 INJECTION, EMULSION INTRAVENOUS at 11:24

## 2018-01-02 RX ADMIN — PROPOFOL 70 MG: 10 INJECTION, EMULSION INTRAVENOUS at 11:15

## 2018-01-02 RX ADMIN — PROPOFOL 30 MG: 10 INJECTION, EMULSION INTRAVENOUS at 11:36

## 2018-01-02 RX ADMIN — ONDANSETRON 4 MG: 2 INJECTION INTRAMUSCULAR; INTRAVENOUS at 10:49

## 2018-01-02 RX ADMIN — PROPOFOL 30 MG: 10 INJECTION, EMULSION INTRAVENOUS at 11:18

## 2018-01-02 RX ADMIN — PROPOFOL 30 MG: 10 INJECTION, EMULSION INTRAVENOUS at 11:17

## 2018-01-02 NOTE — ANESTHESIA PREPROCEDURE EVALUATION
Review of Systems/Medical History  Patient summary reviewed  Chart reviewed      Cardiovascular  Exercise tolerance: good,     Pulmonary  No asthma: , ,   Comment: Sinusitis  GI/Hepatic    Bowel prep            Endo/Other     GYN  Negative gynecology ROS          Hematology  Negative hematology ROS      Musculoskeletal  Negative musculoskeletal ROS        Neurology  Negative neurology ROS      Psychology           Physical Exam    Airway    Mallampati score: I  TM Distance: >3 FB  Neck ROM: full     Dental   Comment: No loose,     Cardiovascular  Rhythm: regular, Rate: normal,     Pulmonary  Breath sounds clear to auscultation,     Other Findings        Anesthesia Plan  ASA Score- 2     Anesthesia Type- IV sedation with anesthesia with ASA Monitors  Additional Monitors:   Airway Plan:         Plan Factors-  Patient did not smoke on day of surgery  Induction- intravenous  Postoperative Plan-     Informed Consent- Anesthetic plan and risks discussed with patient  I personally reviewed this patient with the CRNA  Discussed and agreed on the Anesthesia Plan with the THOMAS Root

## 2018-01-02 NOTE — OP NOTE
Colonoscopy Procedure Note    Procedure: Colonoscopy    Sedation: Monitored anesthesia care, check anesthesia records      ASA Class: 1    INDICATIONS: Screening for Colon Cancer    POST-OP DIAGNOSIS: See the impression below    Procedure Details     Prior colonoscopy: More than 10 years ago  Informed consent was obtained for the procedure, including sedation  Risks of perforation, hemorrhage, adverse drug reaction and aspiration were discussed  The patient was placed in the left lateral decubitus position  Based on the pre-procedure assessment, including review of the patient's medical history, medications, allergies, and review of systems, she had been deemed to be an appropriate candidate for conscious sedation; she was therefore sedated with the medications listed below  The patient was monitored continuously with telemetry, pulse oximetry, blood pressure monitoring, and direct observations  A rectal examination was performed  The variable-stiffness pediatric colonoscope was inserted into the rectum and advanced under direct vision to the cecum, which was identified by the ileocecal valve and appendiceal orifice  The quality of the colonic preparation was good  A careful inspection was made as the colonoscope was withdrawn, including a retroflexed view of the rectum; findings and interventions are described below  Start time 11:31 a m  Cecum time 11:35 a m  End time 11:44 a m  Findings:  1   3 mm flat polyp noted in the transverse colon over a fold, removed using biopsy forceps  2   Mild diverticulosis noted in the ascending and sigmoid colon  3   Retroflexion was performed and revealed small internal hemorrhoids  Complications:  None; patient tolerated the procedure well  Impression:    1  Single polyp in transverse colon  2   Mild Diverticulosis  3   Small internal hemorrhoids  Recommendations:  1  Follow-up biopsy results in 2-3 weeks    2   If the polyp removed is adenomatous, repeat colonoscopy in 5 years, if this is hyperplastic, repeat colonoscopy in 10 years  Sooner if there is any family history of colon cancer  3   High-fiber diet

## 2018-01-02 NOTE — OP NOTE
ESOPHAGOGASTRODUODENOSCOPY    PROCEDURE: EGD    SEDATION: Monitored anesthesia care, check anesthesia records    ASA Class: 2    INDICATIONS:  Epigastric pain, bloating and belching  CONSENT:  Informed consent was obtained for the procedure, including sedation after explaining the risks and benefits of the procedure  Risks including but not limited to bleeding, perforation, infection, and missed lesion  PREPARATION:   Telemetry, pulse oximetry, blood pressure were monitored throughout the procedure  Patient was identified by myself both verbally and by visual inspection of ID band  DESCRIPTION:   Patient was placed in the left lateral decubitus position and was sedated with the above medication  The gastroscope was introduced in to the oropharynx and the esophagus was intubated under direct visualization  Scope was passed down the esophagus up to 2nd part of the duodenum  A careful inspection was made as the gastroscope was withdrawn, including a retroflexed view of the stomach; findings and interventions are described below  FINDINGS:    #1  Esophagus-irregular Z-line noted at 37 cm  There was small hiatal hernia noted starting from 36-39 cm  #2  Stomach-copious amounts of bile noted within the stomach  Gastric body, fundus appeared nodular and there was a erosive mucosa noted within the gastric body and antrum  Biopsies were taken from body and antrum to assess for H pylori  Retroflexion was performed and showed hiatal hernia  #3  Duodenum-duodenal mucosa appeared normal within the bulb, duodenal sweep and D2  Biopsies were obtained to assess for celiac disease  IMPRESSIONS:      1  Irregular Z-line, suggestive of possible short-segment Moreau's esophagus  2   Small hiatal hernia  3   Moderate gastritis  4   Bile reflux  RECOMMENDATIONS:     1  Follow-up biopsy results in 2-3 weeks  2   Avoid NSAIDs  3   Continue pantoprazole 40 mg daily    4   Continue Carafate 1 g b i d , 2 hours separate from other medications  5   Anti-reflux diet  6   Discharge home once discharge parameters are met  COMPLICATIONS:  None; patient tolerated the procedure well            DISPOSITION: PACU           CONDITION: Stable

## 2018-01-02 NOTE — H&P
History and Physical - [facility]  Chani Wyatt 50 y o  female MRN: [id number]    HPI: Chani Wyatt is a 50y o  year old female who presents with evaluation of symptoms of dyspepsia and colon cancer screening  Review of Systems    Historical Information   Past Medical History:   Diagnosis Date    Anxiety     Arthritis     GERD (gastroesophageal reflux disease)     Irritable bowel syndrome     PONV (postoperative nausea and vomiting)      Past Surgical History:   Procedure Laterality Date    BREAST SURGERY Bilateral     cyst removal    COLONOSCOPY      TONSILLECTOMY      WISDOM TOOTH EXTRACTION       Social History   History   Alcohol Use    Yes     Comment: daily     History   Drug Use No     History   Smoking Status    Never Smoker   Smokeless Tobacco    Never Used     History reviewed  No pertinent family history  Meds/Allergies     Prescriptions Prior to Admission   Medication    ALPRAZolam (XANAX) 0 5 mg tablet    cetirizine (ZYRTEC ALLERGY) 10 mg tablet    cholecalciferol (VITAMIN D3) 1,000 units tablet    citalopram (CeleXA) 40 mg tablet    montelukast (SINGULAIR) 10 mg tablet    norethindrone-ethinyl estradiol (FEMHRT LOW DOSE) 0 5-2 5 MG-MCG per tablet    pantoprazole (PROTONIX) 40 mg tablet    sucralfate (CARAFATE) 1 g tablet       No Known Allergies    Objective     Blood pressure 126/63, pulse (!) 106, temperature 99 2 °F (37 3 °C), temperature source Temporal, resp  rate 18, height 5' 2" (1 575 m), weight 57 6 kg (127 lb), SpO2 99 %  PHYSICAL EXAM    Gen: NAD  CV: RRR  CHEST: Clear  ABD: soft, NT/ND  EXT: no edema  Neuro: AAO      ASSESSMENT/PLAN:  This is a 50y o  year old female here for dyspepsia symptoms and colon cancer screening  PLAN:   Procedure:  EGD and colonoscopy

## 2018-01-02 NOTE — H&P
History and Physical - [facility]  Hazeline Klinefelter 50 y o  female MRN: [id number]    HPI: Hazeline Klinefelter is a 50y o  year old female who presents with symptoms of dyspepsia and colon cancer screening  Review of Systems    Historical Information   Past Medical History:   Diagnosis Date    Anxiety     Arthritis     GERD (gastroesophageal reflux disease)     Irritable bowel syndrome     PONV (postoperative nausea and vomiting)      Past Surgical History:   Procedure Laterality Date    BREAST SURGERY Bilateral     cyst removal    COLONOSCOPY      TONSILLECTOMY      WISDOM TOOTH EXTRACTION       Social History   History   Alcohol Use    Yes     Comment: daily     History   Drug use: Unknown     History   Smoking Status    Never Smoker   Smokeless Tobacco    Never Used     History reviewed  No pertinent family history  Meds/Allergies     Prescriptions Prior to Admission   Medication    ALPRAZolam (XANAX) 0 5 mg tablet    cetirizine (ZYRTEC ALLERGY) 10 mg tablet    cholecalciferol (VITAMIN D3) 1,000 units tablet    citalopram (CeleXA) 40 mg tablet    montelukast (SINGULAIR) 10 mg tablet    norethindrone-ethinyl estradiol (FEMHRT LOW DOSE) 0 5-2 5 MG-MCG per tablet    pantoprazole (PROTONIX) 40 mg tablet    sucralfate (CARAFATE) 1 g tablet       No Known Allergies    Objective     Height 5' 2" (1 575 m), weight 58 5 kg (129 lb)  PHYSICAL EXAM    Gen: NAD  CV: RRR  CHEST: Clear  ABD: soft, NT/ND  EXT: no edema  Neuro: AAO      ASSESSMENT/PLAN:  This is a 50y o  year old female here for evaluation of dyspepsia symptoms and colon cancer screening  PLAN:   Procedure:   EGD and colonoscopy

## 2018-01-12 VITALS
DIASTOLIC BLOOD PRESSURE: 78 MMHG | HEIGHT: 62 IN | BODY MASS INDEX: 24.11 KG/M2 | SYSTOLIC BLOOD PRESSURE: 118 MMHG | WEIGHT: 131 LBS

## 2018-01-13 VITALS
DIASTOLIC BLOOD PRESSURE: 62 MMHG | HEIGHT: 62 IN | SYSTOLIC BLOOD PRESSURE: 98 MMHG | BODY MASS INDEX: 23.74 KG/M2 | HEART RATE: 64 BPM | TEMPERATURE: 99.6 F | WEIGHT: 129 LBS

## 2018-01-13 VITALS
WEIGHT: 130.13 LBS | BODY MASS INDEX: 23.95 KG/M2 | TEMPERATURE: 99.5 F | SYSTOLIC BLOOD PRESSURE: 98 MMHG | DIASTOLIC BLOOD PRESSURE: 64 MMHG | HEART RATE: 88 BPM | HEIGHT: 62 IN

## 2018-01-16 ENCOUNTER — GENERIC CONVERSION - ENCOUNTER (OUTPATIENT)
Dept: OTHER | Facility: OTHER | Age: 53
End: 2018-01-16

## 2018-01-22 ENCOUNTER — GENERIC CONVERSION - ENCOUNTER (OUTPATIENT)
Dept: OTHER | Facility: OTHER | Age: 53
End: 2018-01-22

## 2018-01-22 VITALS
WEIGHT: 132 LBS | SYSTOLIC BLOOD PRESSURE: 108 MMHG | BODY MASS INDEX: 24.14 KG/M2 | HEART RATE: 77 BPM | TEMPERATURE: 98.8 F | DIASTOLIC BLOOD PRESSURE: 66 MMHG | OXYGEN SATURATION: 98 %

## 2018-01-23 VITALS
BODY MASS INDEX: 23.92 KG/M2 | DIASTOLIC BLOOD PRESSURE: 76 MMHG | HEIGHT: 62 IN | WEIGHT: 130 LBS | SYSTOLIC BLOOD PRESSURE: 122 MMHG

## 2018-01-23 NOTE — RESULT NOTES
Verified Results  * [facility] RX [id number] 07:51AM Nathaly Cruz     Test Name Result Flag Reference   [facility] HEPAT[facility]ILI[facility] W RX (Report)     HEPATOBILIARY SCAN WITH CHOLECYSTOKININ ADMINISTRATION      INDICATION: K82 8: Other specified diseases of gallbladder  History taken directly from the electronic ordering system  Upper abdominal pain radiating to the back  Excessive gassiness and bloating  COMPARISON: Ultrasound performed on 11/20/2017  TECHNIQUE:  Following the intravenous administration of 5 5 mCi Tc-99m labeled mebrofenin, dynamic abdominal images were obtained over a 60 minute time period  Images were performed in AP projection  FINDINGS:      There is prompt, uniform accumulation with normal clearance of the radiopharmaceutical by the liver  There is normal filling of the intrahepatic ducts, common bile duct and gallbladder with normal excretion of the radiopharmaceutical into the duodenum  In order to evaluate the contractile response of the gallbladder to cholecystokinin stimulation, 1 2 mcg sincalide (0 02 mcg/kg) was administered by slow intravenous infusion over 60 minutes  These images demonstrate normal contraction of the    gallbladder  The calculated gallbladder ejection fraction is 61 % (N = >35%)  IMPRESSION:     1  Normal hepatobiliary study   2  Normal contractile response of the gallbladder to cholecystokinin infusion  (61%)    The patient experienced mild upper abdominal pain and belching during CCK infusion         Workstation performed: [id number]     Signed by:   Kiya Yeager MD   12/20/17

## 2018-01-23 NOTE — CONSULTS
Chief Complaint  Dyspepsia      History of Present Illness  37-year-old female comes in for evaluation of long-standing symptoms of acid reflux which have been getting progressively worse over the past 1-1/2 month  Patient states that she started on pantoprazole 40 mg twice a day which seem to help with the symptoms however she has started taking pantoprazole 40 mg once daily due to insurance issues  She also complains of increased belching but denies dysphagia, odynophagia, hematemesis or melena  She states that she had an EGD and a colonoscopy many years ago, on over the results  She does think that she may have had a polyp  She is due for screening colonoscopy at this time  Review of Systems    Constitutional: No fever, no chills, feels well, no tiredness, no recent weight gain or weight loss  Eyes: No complaints of eye pain, no red eyes, no eyesight problems, no discharge, no dry eyes, no itching of eyes  ENT: no complaints of earache, no loss of hearing, no nose bleeds, no nasal discharge, no sore throat, no hoarseness  Cardiovascular: No complaints of slow heart rate, no fast heart rate, no chest pain, no palpitations, no leg claudication, no lower extremity edema  Respiratory: No complaints of shortness of breath, no wheezing, no cough, no SOB on exertion, no orthopnea, no PND  Gastrointestinal: as noted in HPI  Genitourinary: No complaints of dysuria, no incontinence, no pelvic pain, no dysmenorrhea, no vaginal discharge or bleeding  Musculoskeletal: No complaints of arthralgias, no myalgias, no joint swelling or stiffness, no limb pain or swelling  Integumentary: No complaints of skin rash or lesions, no itching, no skin wounds, no breast pain or lump  Neurological: No complaints of headache, no confusion, no convulsions, no numbness, no dizziness or fainting, no tingling, no limb weakness, no difficulty walking     Psychiatric: Not suicidal, no sleep disturbance, no anxiety or depression, no change in personality, no emotional problems  Endocrine: No complaints of proptosis, no hot flashes, no muscle weakness, no deepening of the voice, no feelings of weakness  Hematologic/Lymphatic: No complaints of swollen glands, no swollen glands in the neck, does not bleed easily, does not bruise easily  ROS reviewed  Active Problems    1  Acute sinusitis (461 9) (J01 90)   2  Allergic rhinitis (477 9) (J30 9)   3  Anxiety disorder (300 00) (F41 9)   4  Biliary dyskinesia (575 8) (K82 8)   5  Bloating (787 3) (R14 0)   6  Bulge of cervical disc without myelopathy (722 0) (M50 20)   7  Cervical paraspinal muscle spasm (728 85) (M62 838)   8  Cervicalgia (723 1) (M54 2)   9  Ecchymoses, spontaneous (782 7) (R23 3)   10  Esophageal reflux (530 81) (K21 9)   11  Fever (780 60) (R50 9)   12  Herniated cervical disc (722 0) (M50 20)   13  Hot flashes (782 62) (R23 2)   14  Influenza (487 1) (J11 1)   15  Lower back pain (724 2) (M54 5)   16  Myofascial pain syndrome (729 1) (M79 1)   17  Need for prophylactic vaccination and inoculation against influenza (V04 81) (Z23)   18  Other fatigue (780 79) (R53 83)   19  Perimenopause (627 2) (N95 1)   20  Perineal irritation (709 9) (L98 9)   21  Pruritus (698 9) (L29 9)   22  Reactive airway disease (493 90) (J45 909)   23  Right knee pain (719 46) (M25 561)   24  Seasonal allergies (477 9) (J30 2)   25  Thyroiditis (245 9) (E06 9)   26  Tracheitis (464 10) (J04 10)   27  Urinary tract infection (599 0) (N39 0)   28  Vaginal dryness (625 8) (N89 8)   29   Weight gain (783 1) (R63 5)    Past Medical History    · History of Acute otitis media, unspecified laterality   · Anxiety disorder (300 00) (F41 9)   · History of Backache (724 5) (M54 9)   · History of arthritis (V13 4) (Z87 39)   · History of asthma (V12 69) (Z87 09)   · History of esophageal reflux (V12 79) (Z87 19)   · Need for prophylactic vaccination and inoculation against influenza (V04 81) (Z23)    The active problems and past medical history were reviewed and updated today  Surgical History    · History of Breast Surgery Lumpectomy   · History of Tonsillectomy    The surgical history was reviewed and updated today  Family History    · Family history of breast cancer in female (V16 3) (Z80 3)    · Family history of scleroderma (V19 8) (Z82 69)    · Family history of breast cancer in female (V16 3) (Z80 3)    · Family history of Back disorder   · Family history of malignant neoplasm of breast (V16 3) (Z80 3)    The family history was reviewed and updated today  Social History    · Alcohol Use (History)   · Daily Coffee Consumption (___ Cups/Day)   · Daily Cola Consumption (___ Cans/Day)   · Daily Tea Consumption (___ Cups/Day)   · Never A Smoker  The social history was reviewed and updated today  Current Meds   1  ALPRAZolam 0 5 MG Oral Tablet; TAKE 1 TABLET AT BEDTIME AS NEEDED; Therapy: 26LVV4847 to (David Pollack)  Requested for: 75Fgn7889; Last   Rx:28Tyk7582 Ordered   2  Citalopram Hydrobromide 40 MG Oral Tablet; Take 1 tablet daily; Therapy: 24OBR5689 to (Natan Torre)  Requested for: 27YYP7925; Last   Rx:61Naw9887 Ordered   3  Clobetasol Propionate 0 05 % External Ointment; APPLY AND GENTLY MASSAGE INTO   AFFECTED AREA(S) TWICE DAILY; Therapy: 83WUT1401 to (Evaluate:35Dbb1229); Last Rx:35Iwr1304 Ordered   4  Montelukast Sodium 10 MG Oral Tablet; TAKE 1 TABLET DAILY; Therapy: 00FVZ7560 to (Elly Lance)  Requested for: 13ESI4709; Last   Rx:99Aou6090 Ordered   5  Norethindrone-Eth Estradiol 0 5-2 5 MG-MCG Oral Tablet; TAKE 1 TABLET DAILY AS   DIRECTED; Therapy: 70OMD3999 to (David Pollack)  Requested for: 54OLL2924; Last   Rx:51Hnf3752 Ordered   6  Pantoprazole Sodium 40 MG Oral Tablet Delayed Release; TAKE 1 TABLET DAILY; Therapy: 74AOG8924 to (Renew:22Oqh9915)  Requested for: 90RNG3733; Last   Rx:56Bbz2895 Ordered   7   Vitamin D 2000 UNIT Oral Tablet; Take 1 tablet daily Recorded   8  Zyrtec 10 MG TABS; Therapy: (Recorded:23Lcl4665) to Recorded    The medication list was reviewed and updated today  Allergies    1  No Known Drug Allergies    Vitals   Recorded: 69ESO9915 12:41PM   Temperature 98 8 F   Heart Rate 77   Systolic 642   Diastolic 66   Weight 869 lb    BMI Calculated 24 14   BSA Calculated 1 6   O2 Saturation 98     Physical Exam    Constitutional   General appearance: No acute distress, well appearing and well nourished  Eyes   Conjunctiva and lids: No swelling, erythema or discharge  Ears, Nose, Mouth, and Throat   External inspection of ears and nose: Normal     Pulmonary   Respiratory effort: No increased work of breathing or signs of respiratory distress  Auscultation of lungs: Clear to auscultation  Cardiovascular   Palpation of heart: Normal PMI, no thrills  Auscultation of heart: Normal rate and rhythm, normal S1 and S2, without murmurs  Examination of extremities for edema and/or varicosities: Normal     Abdomen   Abdomen: Non-tender, no masses  Liver and spleen: No hepatomegaly or splenomegaly  Lymphatic   Palpation of lymph nodes in neck: No lymphadenopathy  Skin   Skin and subcutaneous tissue: Normal without rashes or lesions  Psychiatric   Orientation to person, place, and time: Normal     Mood and affect: Normal          Results/Data  * [facility] W RX [id number] 07:51AM [facility]     Test Name Result Flag Reference   [facility] HEPATOBILIARY W RX (Report)     HEPATOBILIARY SCAN WITH CHOLECYSTOKININ ADMINISTRATION      INDICATION: K82 8: Other specified diseases of gallbladder  History taken directly from the electronic ordering system  Upper abdominal pain radiating to the back  Excessive gassiness and bloating  COMPARISON: Ultrasound performed on 11/20/2017       TECHNIQUE:  Following the intravenous administration of 5 5 mCi Tc-99m labeled mebrofenin, dynamic abdominal images were obtained over a 60 minute time period  Images were performed in AP projection  FINDINGS:      There is prompt, uniform accumulation with normal clearance of the radiopharmaceutical by the liver  There is normal filling of the intrahepatic ducts, common bile duct and gallbladder with normal excretion of the radiopharmaceutical into the duodenum  In order to evaluate the contractile response of the gallbladder to cholecystokinin stimulation, 1 2 mcg sincalide (0 02 mcg/kg) was administered by slow intravenous infusion over 60 minutes  These images demonstrate normal contraction of the    gallbladder  The calculated gallbladder ejection fraction is 61 % (N = >35%)  IMPRESSION:     1  Normal hepatobiliary study   2  Normal contractile response of the gallbladder to cholecystokinin infusion  (61%)    The patient experienced mild upper abdominal pain and belching during CCK infusion  Workstation performed: [id number]     Signed by:   Ana Reese MD   12/20/17     US ABDOMEN LIMITED [id number] 09:19AM Annie Wilkins Order Number: [id number]    - Patient Instructions: To schedule this appointment, please contact Central Scheduling at [phone]  Test Name Result Flag Reference   US ABDOMEN LIMITED (Report)     RIGHT UPPER QUADRANT ULTRASOUND     INDICATION: Abdominal distention and pain     COMPARISON: None  TECHNIQUE:  Real-time ultrasound of the right upper quadrant was performed with a curvilinear transducer with both volumetric sweeps and still imaging techniques  FINDINGS:     PANCREAS: Visualized portions of the pancreas are within normal limits  AORTA AND IVC: Visualized portions are normal for patient age  LIVER:   Size: Within normal range  The liver measures 15 3 cm in the midclavicular line  Contour: Surface contour is smooth  Parenchyma: Echogenicity and echotexture are within normal limits  No evidence of suspicious mass     Limited imaging of the main portal vein shows it to be patent and hepatopetal       BILIARY:   The gallbladder is normal in caliber  No wall thickening or pericholecystic fluid  No stones or sludge identified  No sonographic Neal's sign  No intrahepatic biliary dilatation  CBD measures 4 mm  No choledocholithiasis  KIDNEY:    Right kidney measures 9 9 x 4 0 cm  Within normal limits  ASCITES:  None  IMPRESSION:     Normal right upper quadrant ultrasound       Workstation performed: [id number]     Signed by:   Josh Leigh MD   11/20/17     (Q) GLIADIN ANTIBODY (IGG) [id number] 03:43PM [facility]     Test Name Result Flag Reference   GLIADIN ANTIBODY (IGG) 3 Units     Value  Interpretation            -----  --------------            <20    Antibody not detected            >or=20 Antibody detected     (1) CBC/PLT/DIFF [id number] 07:44AM Brittney Green Order Number: [id number]     Test Name Result Flag Reference   WBC COUNT 5 21 Thousand/uL  4 31-10 16   RBC COUNT 4 27 Million/uL  3 81-5 12   HEMOGLOBIN 13 5 g/dL  11 5-15 4   HEMATOCRIT 40 9 %  34 8-46  1   MCV 96 fL  82-98   MCH 31 6 pg  26 8-34 3   MCHC 33 0 g/dL  31 4-37 4   RDW 12 6 %  11 6-15 1   MPV 9 4 fL  8 9-12 7   PLATELET COUNT 838 Thousands/uL  149-390   nRBC AUTOMATED 0 /100 WBCs     NEUTROPHILS RELATIVE PERCENT 53 %  43-75   LYMPHOCYTES RELATIVE PERCENT 32 %  14-44   MONOCYTES RELATIVE PERCENT 9 %  4-12   EOSINOPHILS RELATIVE PERCENT 5 %  0-6   BASOPHILS RELATIVE PERCENT 1 %  0-1   NEUTROPHILS ABSOLUTE COUNT 2 77 Thousands/? ??L  1 85-7 62   LYMPHOCYTES ABSOLUTE COUNT 1 67 Thousands/? ??L  0 60-4 47   MONOCYTES ABSOLUTE COUNT 0 45 Thousand/? ??L  0 17-1 22   EOSINOPHILS ABSOLUTE COUNT 0 25 Thousand/? ??L  0 00-0 61   BASOPHILS ABSOLUTE COUNT 0 05 Thousands/? ??L  0 00-0 10     (1) COMPREHENSIVE METABOLIC PANEL [id number] 68:54GN Jonatan Brantley Order Number: [id number]     Test Name Result Flag Reference SODIUM 139 mmol/L  136-145   POTASSIUM 4 3 mmol/L  3 5-5 3   CHLORIDE 106 mmol/L  100-108   CARBON DIOXIDE 29 mmol/L  21-32   ANION GAP (CALC) 4 mmol/L  4-13   BLOOD UREA NITROGEN 16 mg/dL  5-25   CREATININE 0 75 mg/dL  0 60-1 30   Standardized to IDMS reference method   CALCIUM 9 2 mg/dL  8 3-10 1   BILI, TOTAL 0 46 mg/dL  0 20-1 00   ALK PHOSPHATAS 66 U/L     ALT (SGPT) 19 U/L  12-78   Specimen collection should occur prior to Sulfasalazine and/or Sulfapyridine administration due to the potential for falsely depressed results  AST(SGOT) 21 U/L  5-45   Specimen collection should occur prior to Sulfasalazine administration due to the potential for falsely depressed results  ALBUMIN 3 8 g/dL  3 5-5 0   TOTAL PROTEIN 7 4 g/dL  6 4-8 2   eGFR 93 ml/min/1 73sq m     National Kidney Disease Education Program recommendations are as follows:  GFR calculation is accurate only with a steady state creatinine  Chronic Kidney disease less than 60 ml/min/1 73 sq  meters  Kidney failure less than 15 ml/min/1 73 sq  meters  GLUCOSE FASTING 91 mg/dL  65-99   Specimen collection should occur prior to Sulfasalazine administration due to the potential for falsely depressed results  Specimen collection should occur prior to Sulfapyridine administration due to the potential for falsely elevated results  (1) TSH [id number] 07:44AM Davonte Green Order Number: [id number]     Test Name Result Flag Reference   TSH 0 827 uIU/mL  0 358-3 740   Patients undergoing fluorescein dye angiography may retain small amounts of fluorescein in the body for 48-72 hours post procedure  Samples containing fluorescein can produce falsely depressed TSH values  If the patient had this procedure,a specimen should be resubmitted post fluorescein clearance            The recommended reference ranges for TSH during pregnancy are as follows:  First trimester 0 1 to 2 5 uIU/mL  Second trimester  0 2 to 3 0 uIU/mL  Third trimester 0 3 to 3 0 uIU/m     Assessment    1  Colon cancer screening (V76 51) (Z12 11)   2  Abdominal pain (789 00) (R10 9)   3  Bloating (787 3) (R14 0)   4  Esophageal reflux (530 81) (K21 9)    Plan  Bloating, Esophageal reflux    · EGD; Status:Hold For - Scheduling; Requested for:64Azl2307;    Perform:[facility]; XAC:89AWC7199; Ordered; For:Bloating, Esophageal reflux; Ordered By:Everette Jorgensen;  Esophageal reflux    · Sucralfate 1 GM Oral Tablet; TAKE 1 TABLET EVERY 12 HOURS   Rx By: Claudia Lin; Dispense: 30 Days ; #:60 Tablet; Refill: 3; For: Esophageal reflux; RAYNA = N; Verified Transmission to [address] [place] [address]; Last Updated By: System, [employer]; 12/27/2017 1:09:53 PM    Discussion/Summary    1  Epigastric pain, bloating and belching: suggestive of dyspepsia, right upper quadrant ultrasound was negative for colonic lithiasis, HIDA scan was negative  CBC with normal hemoglobin, normal TSH, normal comprehensive metabolic panel  Normal gliadin antibody  She does have daily use of NSAIDs due to back pain  I suspect she may have peptic ulcer disease secondary to NSAID use versus H  pylori gastritis  2  Avoid NSAIDs  3  Will add Carafate encases component of bile reflux  Continue pantoprazole 40 mg, 30 minutes before dinner as symptoms are predominantly during the evenings  4  Will schedule EGD for further evaluation  5  Colon cancer screening average risk, will schedule colonoscopy  6  Patient was explained about the risks and benefits of the procedure  Risks including but not limited to bleeding, infection, perforation were explained in detail  Also explained about less than 100% sensitivity with the exam and other alternatives        Future Appointments    Signatures   Electronically signed by : SAMANTHA Kent ; Dec 27 2017  1:24PM EST                       (Author)

## 2018-01-23 NOTE — RESULT NOTES
Discussion/Summary   Esophageal biopsies did not show Moreau's but did show esophagitis, there is no evidence of H  pylori or celiac disease  No need for repeat EGD  Colonoscopy showed a benign colon because of her polyp, needs repeat colonoscopy in 10 years, sooner if any family history of colon cancer     Verified Results  (1) TISSUE EXAM [id number] 11:19AM Brionna Gracia     Test Name Result Flag Reference   LAB AP CASE REPORT (Report)     Surgical Pathology Report             Case: [id number]                   Authorizing Provider: Vidya Eng MD    Collected:      01/02/2018 1119        Ordering Location:   [facility]    Received:      01/02/2018 [address] [place] [address]                            Pathologist:      Lala Spivey MD                             Specimens:  A) - Duodenum, duodenal bx                                       B) - Stomach, gastric bx                                        C) - Esophagus, distal esophagus bx                                  D) - Polyp, Colorectal, cold forceps transverse polyp   LAB AP FINAL DIAGNOSIS (Report)     A  Duodenum, biopsy:  - Benign duodenal mucosa  - No villous atrophy, intraepithelial lymphocytosis or crypt hyperplasia;   negative for features of malabsorptive enteropathy   - Negative for chronic or active duodenitis, dysplasia or malignancy  B  Stomach, biopsy:  - Chronic inactive oxyntic and antral gastritis with features of proton   pump inhibitor therapy effect   - Negative for Helicobacter pylori, confirmed by immunohistochemical   stain, evaluated with appropriate positive controls  - Negative for atrophy, intestinal metaplasia, dysplasia or carcinoma  - Alcian blue/PAS stain is negative for intestinal type mucin  C  Esophagus, distal, biopsy:  - Gastroesophageal junction mucosa with chronic inflammation and reactive   change    - Negative for intestinal metaplasia (Moreau's esophagus), dysplasia or   carcinoma  D  Colon, transverse polyp, biopsy:  - Portions of colonic mucosa with mild nonspecific reactive changes,   negative for distinct features of polyp  Electronically signed by Lise Escobar MD on 1/4/2018 at 9:14 AM   LAB AP SURGICAL ADDITIONAL INFORMATION (Report)     All controls performed with the immunohistochemical stains reported above   reacted appropriately  These tests were developed and their performance   characteristics determined by [facility] or   [facility]  They may not be cleared or approved by the [place]  Food and Drug Administration  The FDA has determined that such clearance   or approval is not necessary  These tests are used for clinical purposes  They should not be regarded as investigational or for research  This   laboratory has been approved by [place] [id number], designated as a high-complexity   laboratory and is qualified to perform these tests  Interpretation performed at [place] [address]   LAB AP GROSS DESCRIPTION (Report)     A  The specimen is received in formalin, labeled with the patient's name   and hospital number, and is designated duodenal biopsy, are 3   irregularly-shaped fragments of tan-red soft tissue measuring 0 5 x 0 5 x   0 1 cm in aggregate  Entirely submitted  1 cassette  B  The specimen is received in formalin, labeled with the patient's name   and hospital number, and is designated Gastric biopsy, are multiple   irregularly-shaped fragments tan soft tissue measuring 0 5 x 0 5 x 0 1 cm   in aggregate  Entirely submitted  1 cassette  C  The specimen is received in formalin, labeled with the patient's name   and hospital number, and is designated distal esophagus biopsy, are   multiple irregularly-shaped fragments tan soft tissue measuring 0 5 x 0 4   x 0 1 cm in aggregate  Entirely submitted   1 cassette  D  The specimen is received in formalin, labeled with the patient's name   and hospital number, and is designated cold forceps transverse polyp,   are 2 irregularly-shaped fragments tan soft tissue measuring 0 5 and 0 1   cm in greatest dimension  Entirely submitted  1 cassette  Note: The estimated total formalin fixation time based upon information   provided by the submitting clinician and the standard processing schedule   is under 72 hours   [place]   LAB AP CLINICAL INFORMATION R/o celiac     R/o celiac

## 2018-01-25 DIAGNOSIS — F41.9 ANXIETY: Primary | ICD-10-CM

## 2018-01-25 RX ORDER — ALPRAZOLAM 0.5 MG/1
0.5 TABLET ORAL
Qty: 30 TABLET | Refills: 0 | OUTPATIENT
Start: 2018-01-25 | End: 2018-02-27 | Stop reason: SDUPTHER

## 2018-02-14 ENCOUNTER — OFFICE VISIT (OUTPATIENT)
Dept: OBGYN CLINIC | Facility: CLINIC | Age: 53
End: 2018-02-14
Payer: COMMERCIAL

## 2018-02-14 VITALS
WEIGHT: 132 LBS | BODY MASS INDEX: 24.29 KG/M2 | DIASTOLIC BLOOD PRESSURE: 78 MMHG | HEIGHT: 62 IN | SYSTOLIC BLOOD PRESSURE: 118 MMHG

## 2018-02-14 DIAGNOSIS — Z01.419 ENCOUNTER FOR ROUTINE GYNECOLOGICAL EXAMINATION WITH PAPANICOLAOU SMEAR OF CERVIX: Primary | ICD-10-CM

## 2018-02-14 DIAGNOSIS — N92.4 ABNORMAL PERIMENOPAUSAL BLEEDING: ICD-10-CM

## 2018-02-14 DIAGNOSIS — Z12.31 ENCOUNTER FOR SCREENING MAMMOGRAM FOR BREAST CANCER: ICD-10-CM

## 2018-02-14 PROBLEM — L29.3 PERINEAL IRRITATION: Status: ACTIVE | Noted: 2017-09-06

## 2018-02-14 PROBLEM — R63.5 WEIGHT GAIN: Status: ACTIVE | Noted: 2017-09-06

## 2018-02-14 PROBLEM — K82.8 BILIARY DYSKINESIA: Status: ACTIVE | Noted: 2017-11-21

## 2018-02-14 PROBLEM — R14.0 BLOATING: Status: ACTIVE | Noted: 2017-11-15

## 2018-02-14 PROBLEM — R23.2 HOT FLASHES: Status: ACTIVE | Noted: 2017-09-06

## 2018-02-14 PROBLEM — R53.83 OTHER FATIGUE: Status: ACTIVE | Noted: 2017-09-06

## 2018-02-14 PROBLEM — N89.8 VAGINAL DRYNESS: Status: ACTIVE | Noted: 2017-09-06

## 2018-02-14 PROBLEM — N95.1 PERIMENOPAUSE: Status: ACTIVE | Noted: 2017-08-08

## 2018-02-14 PROCEDURE — S0612 ANNUAL GYNECOLOGICAL EXAMINA: HCPCS | Performed by: PHYSICIAN ASSISTANT

## 2018-02-14 RX ORDER — CLOBETASOL PROPIONATE 0.5 MG/G
OINTMENT TOPICAL 2 TIMES DAILY
COMMUNITY
Start: 2017-09-06 | End: 2019-12-26 | Stop reason: ALTCHOICE

## 2018-02-14 RX ORDER — CHOLECALCIFEROL (VITAMIN D3) 125 MCG
1 CAPSULE ORAL DAILY
COMMUNITY

## 2018-02-14 NOTE — PROGRESS NOTES
Assessment/Plan:       Problem List Items Addressed This Visit     Abnormal perimenopausal bleeding     Reviewed perimenopausal bleeding  Patient was almost at 1 year no menses when had bleeding  Reassuring that she had breast pain and other premenstrual symptoms prior to bleeding  Reviewed with patient recommendation to get pelvic ultrasound to evaluate endometrial stripe  Reviewed cervical polyp with patient, may be cause of some spotting but likely did not cause heavy menstrual flow  Recommend removal, patient declines at this time, will see what pelvic ultrasound shows if needed endometrial biopsy will consider polyp removal with biopsy  Relevant Orders    US pelvis complete non OB    Encounter for routine gynecological examination with Papanicolaou smear of cervix - Primary     Reviewed pap guidelines with patient  PAP & HPV done today  Reviewed with patient to continue to monitor headaches on HRT  Offered to trial off for period of time to see if headaches are still persisting, patient declines  Patient will return to office for annual or as needed  Relevant Orders    GP Liquid-Based Pap + HPV Plus      Other Visit Diagnoses     Encounter for screening mammogram for breast cancer        Relevant Orders    Mammo screening bilateral w cad            Subjective:      Patient ID: Leandra Phan is a 50 y o  y o  female  HPI  51 yo seen for annual exam  Patient is currently on [facility] for hot flashes, reports hot flashes have improved significantly  Noticed that she gets mild headaches daily typically located bilateral forehead around 2pm everyday  Reports has sinus issues as well so originally attributed to that but wasn't sure if HRT was contributing  No aura  LMP: 2/2017  Patient reports had a menses for 10 days starting 2/2/2018  Had breast pain before menses started and reports bleeding was a little lighter than typically menses  Denies missing any HRT pills   Was not associated after intercourse, no vaginal discharge  Denies bowel or bladder issues  Last pap: 2/13/2017 NILM No HPV was done at that time  The following portions of the patient's history were reviewed and updated as appropriate:   She  has a past medical history of Anxiety; Arthritis; Asthma; GERD (gastroesophageal reflux disease); Irritable bowel syndrome; and PONV (postoperative nausea and vomiting)  She  does not have any pertinent problems on file  She  has a past surgical history that includes Tonsillectomy; [place] tooth extraction; Breast surgery (Bilateral); Colonoscopy; and pr esophagogastroduodenoscopy transoral diagnostic (N/A, 1/2/2018)  Her family history includes Breast cancer in her family, maternal grandmother, and mother; Other in her family and mother; Scleroderma in her father  She  reports that she has never smoked  She has never used smokeless tobacco  She reports that she drinks alcohol  She reports that she does not use drugs  Current Outpatient Prescriptions   Medication Sig Dispense Refill    ALPRAZolam (XANAX) 0 5 mg tablet Take 1 tablet by mouth daily at bedtime as needed for anxiety 30 tablet 0    cetirizine (ZYRTEC ALLERGY) 10 mg tablet Take by mouth      Cholecalciferol (VITAMIN D3) 2000 units TABS Take 1 tablet by mouth daily      citalopram (CeleXA) 40 mg tablet Take 1 tablet by mouth daily      clobetasol (TEMOVATE) 0 05 % ointment Apply topically 2 (two) times a day      montelukast (SINGULAIR) 10 mg tablet Take 1 tablet by mouth daily      norethindrone-ethinyl estradiol (FEMHRT LOW DOSE) 0 5-2 5 MG-MCG per tablet Take 1 tablet by mouth daily      pantoprazole (PROTONIX) 40 mg tablet Take 1 tablet by mouth daily      sucralfate (CARAFATE) 1 g tablet Take 1 g by mouth 4 (four) times a day      cholecalciferol (VITAMIN D3) 1,000 units tablet Take 1,000 Units by mouth daily       No current facility-administered medications for this visit        She has No Known Allergies       Menstrual History:  OB History      Para Term  AB Living    1 1 1     1    SAB TAB Ectopic Multiple Live Births            1         Menarche age: 17  No LMP recorded  Patient is postmenopausal   Period Pattern: (!) Irregular     Review of Systems   Constitutional: Negative for fatigue, fever and unexpected weight change  HENT: Negative for dental problem and sinus pressure  Eyes: Negative for visual disturbance  Respiratory: Negative for cough, shortness of breath and wheezing  Cardiovascular: Negative for chest pain  Gastrointestinal: Negative for abdominal pain, blood in stool, constipation, diarrhea, nausea and vomiting  Endocrine: Negative for polydipsia  Genitourinary: Negative for difficulty urinating, dyspareunia, dysuria, frequency, hematuria, pelvic pain and urgency  Musculoskeletal: Negative for arthralgias and back pain  Neurological: Negative for dizziness, seizures, light-headedness and headaches  Psychiatric/Behavioral: Negative for suicidal ideas  The patient is not nervous/anxious  Objective:     Physical Exam   Constitutional: She is oriented to person, place, and time  She appears well-developed and well-nourished  Genitourinary: Rectum normal, vagina normal and uterus normal  There is no rash, tenderness, lesion, injury or Bartholin's cyst on the right labia  There is no rash, tenderness, lesion, injury or Bartholin's cyst on the left labia  Vagina exhibits no lesion  No erythema, tenderness or bleeding in the vagina  No signs of injury around the vagina  No vaginal discharge found  Right adnexum does not display mass, does not display tenderness and does not display fullness  Left adnexum does not display mass, does not display tenderness and does not display fullness  Cervix does not exhibit motion tenderness, lesion or discharge  Uterus is not enlarged, tender, exhibiting a mass, irregular (is regular) or mobile   Rectal exam shows no external hemorrhoid, no internal hemorrhoid, no fissure, no mass, no tenderness, anal tone normal and guaiac negative stool  HENT:   Head: Normocephalic and atraumatic  Neck: No thyromegaly present  Cardiovascular: Normal rate, regular rhythm and normal heart sounds  Exam reveals no gallop and no friction rub  No murmur heard  Pulmonary/Chest: Effort normal and breath sounds normal  No respiratory distress  She has no wheezes  Right breast exhibits no inverted nipple, no mass, no nipple discharge, no skin change and no tenderness  Left breast exhibits no inverted nipple, no mass, no nipple discharge, no skin change and no tenderness  Breasts are symmetrical  There is no breast swelling  Abdominal: Soft  She exhibits no distension and no mass  There is no tenderness  There is no rebound and no guarding  No hernia  Lymphadenopathy:     She has no cervical adenopathy  Right: No inguinal adenopathy present  Left: No inguinal adenopathy present  Neurological: She is alert and oriented to person, place, and time  Skin: Skin is warm and dry  Psychiatric: She has a normal mood and affect   Her behavior is normal

## 2018-02-14 NOTE — ASSESSMENT & PLAN NOTE
Reviewed pap guidelines with patient  PAP & HPV done today  Reviewed with patient to continue to monitor headaches on HRT  Offered to trial off for period of time to see if headaches are still persisting, patient declines  Patient will return to office for annual or as needed

## 2018-02-14 NOTE — ASSESSMENT & PLAN NOTE
Reviewed perimenopausal bleeding  Patient was almost at 1 year no menses when had bleeding  Reassuring that she had breast pain and other premenstrual symptoms prior to bleeding  Reviewed with patient recommendation to get pelvic ultrasound to evaluate endometrial stripe  Reviewed cervical polyp with patient, may be cause of some spotting but likely did not cause heavy menstrual flow  Recommend removal, patient declines at this time, will see what pelvic ultrasound shows if needed endometrial biopsy will consider polyp removal with biopsy

## 2018-02-19 LAB
HPV HR 12 DNA CVX QL NAA+PROBE: NOT DETECTED
HPV16 DNA SPEC QL NAA+PROBE: NOT DETECTED
HPV18 DNA SPEC QL NAA+PROBE: NOT DETECTED
THIN PREP CVX: NORMAL

## 2018-02-23 ENCOUNTER — HOSPITAL ENCOUNTER (OUTPATIENT)
Dept: RADIOLOGY | Facility: MEDICAL CENTER | Age: 53
Discharge: HOME | End: 2018-02-23
Payer: COMMERCIAL

## 2018-02-23 DIAGNOSIS — N92.4 ABNORMAL PERIMENOPAUSAL BLEEDING: ICD-10-CM

## 2018-02-23 PROCEDURE — 76856 US EXAM PELVIC COMPLETE: CPT

## 2018-02-27 DIAGNOSIS — F41.9 ANXIETY: ICD-10-CM

## 2018-02-27 RX ORDER — ALPRAZOLAM 0.5 MG/1
0.5 TABLET ORAL
Qty: 30 TABLET | Refills: 0 | OUTPATIENT
Start: 2018-02-27 | End: 2018-03-28 | Stop reason: SDUPTHER

## 2018-03-28 DIAGNOSIS — F41.9 ANXIETY: ICD-10-CM

## 2018-03-28 RX ORDER — ALPRAZOLAM 0.5 MG/1
0.5 TABLET ORAL
Qty: 30 TABLET | Refills: 0 | OUTPATIENT
Start: 2018-03-28 | End: 2018-05-03 | Stop reason: SDUPTHER

## 2018-04-12 ENCOUNTER — OFFICE VISIT (OUTPATIENT)
Dept: OBGYN CLINIC | Facility: CLINIC | Age: 53
End: 2018-04-12

## 2018-04-12 VITALS
DIASTOLIC BLOOD PRESSURE: 72 MMHG | HEIGHT: 62 IN | WEIGHT: 132 LBS | BODY MASS INDEX: 24.29 KG/M2 | SYSTOLIC BLOOD PRESSURE: 112 MMHG

## 2018-04-12 DIAGNOSIS — R87.615 UNSATISFACTORY CERVICAL PAPANICOLAOU SMEAR: Primary | ICD-10-CM

## 2018-04-12 PROCEDURE — REPAP: Performed by: PHYSICIAN ASSISTANT

## 2018-04-12 NOTE — PROGRESS NOTES
Assessment/Plan:    Unsatisfactory cervical Papanicolaou smear  Pap done today, office will call if results abnormal    Return to office for annual or as needed  Problem List Items Addressed This Visit     Unsatisfactory cervical Papanicolaou smear - Primary     Pap done today, office will call if results abnormal    Return to office for annual or as needed  Subjective:      Patient ID: Manuel Osman is a 48 y o  female  HPI  47 yo seen for repap  Pap on 2/14/2018 was unsatisfactory  The following portions of the patient's history were reviewed and updated as appropriate:   She  has a past medical history of Anxiety; Arthritis; Asthma; GERD (gastroesophageal reflux disease); Irritable bowel syndrome; and PONV (postoperative nausea and vomiting)    She   Patient Active Problem List    Diagnosis Date Noted    Unsatisfactory cervical Papanicolaou smear 04/12/2018    Abnormal perimenopausal bleeding 02/14/2018    Encounter for routine gynecological examination with Papanicolaou smear of cervix 02/14/2018    Biliary dyskinesia 11/21/2017    Bloating 11/15/2017    Hot flashes 09/06/2017    Other fatigue 09/06/2017    Perineal irritation 09/06/2017    Vaginal dryness 09/06/2017    Weight gain 09/06/2017    Perimenopause 08/08/2017    Fever 02/23/2016    Influenza 02/23/2016    Allergic rhinitis 11/09/2015    Neck pain 05/07/2015    Myofascial pain syndrome 05/07/2015    Bulge of cervical disc without myelopathy 04/30/2015    Herniated cervical disc 04/30/2015    Cervical paraspinal muscle spasm 04/20/2015    Reactive airway disease 11/07/2014    Seasonal allergies 11/07/2014    Tracheitis 05/13/2014    Thyroiditis 10/07/2013    Pruritus 10/01/2013    Esophageal reflux 01/01/2013    Anxiety disorder 10/02/2012    Lower back pain 10/01/2012     She  has a past surgical history that includes Tonsillectomy; [place] tooth extraction; Breast surgery (Bilateral); Colonoscopy; and pr esophagogastroduodenoscopy transoral diagnostic (N/A, 1/2/2018)  Her family history includes Breast cancer in her family, maternal grandmother, and mother; Other in her family and mother; Scleroderma in her father  She  reports that she has never smoked  She has never used smokeless tobacco  She reports that she drinks alcohol  She reports that she does not use drugs  Current Outpatient Prescriptions   Medication Sig Dispense Refill    ALPRAZolam (XANAX) 0 5 mg tablet Take 1 tablet (0 5 mg total) by mouth daily at bedtime as needed for anxiety 30 tablet 0    cetirizine (ZYRTEC ALLERGY) 10 mg tablet Take by mouth      cholecalciferol (VITAMIN D3) 1,000 units tablet Take 1,000 Units by mouth daily      Cholecalciferol (VITAMIN D3) 2000 units TABS Take 1 tablet by mouth daily      citalopram (CeleXA) 40 mg tablet Take 1 tablet by mouth daily      clobetasol (TEMOVATE) 0 05 % ointment Apply topically 2 (two) times a day      montelukast (SINGULAIR) 10 mg tablet Take 1 tablet by mouth daily      norethindrone-ethinyl estradiol (FEMHRT LOW DOSE) 0 5-2 5 MG-MCG per tablet Take 1 tablet by mouth daily      pantoprazole (PROTONIX) 40 mg tablet Take 1 tablet by mouth daily      sucralfate (CARAFATE) 1 g tablet Take 1 g by mouth 4 (four) times a day       No current facility-administered medications for this visit  She has No Known Allergies       Review of Systems   Constitutional: Negative for fatigue, fever and unexpected weight change  HENT: Negative for dental problem and sinus pressure  Eyes: Negative for visual disturbance  Respiratory: Negative for cough, shortness of breath and wheezing  Cardiovascular: Negative for chest pain  Gastrointestinal: Negative for abdominal pain, blood in stool, constipation, diarrhea, nausea and vomiting  Endocrine: Negative for polydipsia     Genitourinary: Negative for difficulty urinating, dyspareunia, dysuria, frequency, hematuria, pelvic pain and urgency  Musculoskeletal: Negative for arthralgias and back pain  Neurological: Negative for dizziness, seizures, light-headedness and headaches  Psychiatric/Behavioral: Negative for suicidal ideas  The patient is not nervous/anxious  Objective: There were no vitals taken for this visit  Physical Exam   Constitutional: She is oriented to person, place, and time  She appears well-developed and well-nourished  Genitourinary: There is no rash, tenderness, lesion or injury on the right labia  There is no rash, tenderness, lesion or injury on the left labia  Cervix exhibits no motion tenderness, no discharge and no friability  No erythema, tenderness or bleeding in the vagina  No foreign body in the vagina  No signs of injury around the vagina  No vaginal discharge found  Neurological: She is alert and oriented to person, place, and time  Skin: Skin is warm and dry  No rash noted  No erythema  No pallor

## 2018-04-17 LAB — THIN PREP CVX: NORMAL

## 2018-05-03 DIAGNOSIS — F41.9 ANXIETY: ICD-10-CM

## 2018-05-04 RX ORDER — ALPRAZOLAM 0.5 MG/1
0.5 TABLET ORAL
Qty: 30 TABLET | Refills: 1 | OUTPATIENT
Start: 2018-05-04 | End: 2018-07-13 | Stop reason: SDUPTHER

## 2018-05-10 DIAGNOSIS — K21.9 GASTROESOPHAGEAL REFLUX DISEASE WITHOUT ESOPHAGITIS: Primary | ICD-10-CM

## 2018-05-11 RX ORDER — PANTOPRAZOLE SODIUM 40 MG/1
40 TABLET, DELAYED RELEASE ORAL DAILY
Qty: 30 TABLET | Refills: 3 | Status: SHIPPED | OUTPATIENT
Start: 2018-05-11 | End: 2018-09-11 | Stop reason: SDUPTHER

## 2018-05-22 ENCOUNTER — OFFICE VISIT (OUTPATIENT)
Dept: FAMILY MEDICINE CLINIC | Facility: CLINIC | Age: 53
End: 2018-05-22
Payer: COMMERCIAL

## 2018-05-22 VITALS
TEMPERATURE: 98.4 F | BODY MASS INDEX: 24.48 KG/M2 | HEART RATE: 84 BPM | SYSTOLIC BLOOD PRESSURE: 104 MMHG | HEIGHT: 62 IN | DIASTOLIC BLOOD PRESSURE: 72 MMHG | WEIGHT: 133 LBS

## 2018-05-22 DIAGNOSIS — N39.0 URINARY TRACT INFECTION WITHOUT HEMATURIA, SITE UNSPECIFIED: Primary | ICD-10-CM

## 2018-05-22 DIAGNOSIS — R10.9 RIGHT FLANK PAIN: ICD-10-CM

## 2018-05-22 LAB
SL AMB  POCT GLUCOSE, UA: NEGATIVE
SL AMB LEUKOCYTE ESTERASE,UA: NORMAL
SL AMB POCT BILIRUBIN,UA: NEGATIVE
SL AMB POCT BLOOD,UA: NORMAL
SL AMB POCT CLARITY,UA: CLEAR
SL AMB POCT COLOR,UA: YELLOW
SL AMB POCT KETONES,UA: NEGATIVE
SL AMB POCT NITRITE,UA: NEGATIVE
SL AMB POCT PH,UA: 5
SL AMB POCT SPECIFIC GRAVITY,UA: 1
SL AMB POCT URINE PROTEIN: NORMAL
SL AMB POCT UROBILINOGEN: NEGATIVE

## 2018-05-22 PROCEDURE — 81002 URINALYSIS NONAUTO W/O SCOPE: CPT | Performed by: NURSE PRACTITIONER

## 2018-05-22 PROCEDURE — 99213 OFFICE O/P EST LOW 20 MIN: CPT | Performed by: NURSE PRACTITIONER

## 2018-05-22 RX ORDER — NITROFURANTOIN 25; 75 MG/1; MG/1
100 CAPSULE ORAL 2 TIMES DAILY
Qty: 14 CAPSULE | Refills: 0 | Status: SHIPPED | OUTPATIENT
Start: 2018-05-22 | End: 2019-02-19

## 2018-05-22 NOTE — PROGRESS NOTES
Patient ID: Kavita Armenta is a 48 y o  female  HPI: 48 y  o female presenting with fatigue, right flank pain and leg pain for three days  Patient denies urinary frequency but does feel bladder pressure after urination  Denies fever or chills being associated with above mentioned symptoms  SUBJECTIVE    Family History   Problem Relation Age of Onset    Breast cancer Mother      dx in 57's    Other Mother      stenosis    Scleroderma Father     Breast cancer Maternal Grandmother      dx in [de-identified] Breast cancer Family     Other Family      back disorder     Social History     Social History    Marital status: /Civil Union     Spouse name: N/A    Number of children: N/A    Years of education: N/A     Occupational History    Not on file  Social History Main Topics    Smoking status: Never Smoker    Smokeless tobacco: Never Used    Alcohol use Yes      Comment: daily    Drug use: No    Sexual activity: Yes     Partners: Male     Other Topics Concern    Not on file     Social History Narrative    Drinks coffee daily     Drinks cola daily    Drinks tea daily         Past Medical History:   Diagnosis Date    Anxiety     Arthritis     Asthma     GERD (gastroesophageal reflux disease)     Irritable bowel syndrome     PONV (postoperative nausea and vomiting)      Past Surgical History:   Procedure Laterality Date    BREAST SURGERY Bilateral     cyst removal    COLONOSCOPY      AR ESOPHAGOGASTRODUODENOSCOPY TRANSORAL DIAGNOSTIC N/A 1/2/2018    Procedure: EGD AND COLONOSCOPY;  Surgeon: Ernie Shankar MD;  [facility];   Service: Gastroenterology    TONSILLECTOMY      WISDOM TOOTH EXTRACTION       No Known Allergies    Current Outpatient Prescriptions:     ALPRAZolam (XANAX) 0 5 mg tablet, Take 1 tablet (0 5 mg total) by mouth daily at bedtime as needed for anxiety, Disp: 30 tablet, Rfl: 1    cetirizine (ZYRTEC ALLERGY) 10 mg tablet, Take by mouth, Disp: , Rfl:    Cholecalciferol (VITAMIN D3) 2000 units TABS, Take 1 tablet by mouth daily, Disp: , Rfl:     citalopram (CeleXA) 40 mg tablet, Take 1 tablet by mouth daily, Disp: , Rfl:     clobetasol (TEMOVATE) 0 05 % ointment, Apply topically 2 (two) times a day, Disp: , Rfl:     norethindrone-ethinyl estradiol (FEMHRT LOW DOSE) 0 5-2 5 MG-MCG per tablet, Take 1 tablet by mouth daily, Disp: , Rfl:     pantoprazole (PROTONIX) 40 mg tablet, Take 1 tablet (40 mg total) by mouth daily, Disp: 30 tablet, Rfl: 3    sucralfate (CARAFATE) 1 g tablet, Take 1 g by mouth 4 (four) times a day, Disp: , Rfl:     Review of Systems    Consitutional:  Positive for generalized fatigue  Denies chills and fever   Pulmonary:  Denies shortness of breath or dyspnea on exertion    Cardiovascular:  Denies chest pain/pressure   Abdomen:   Denies abdominal pain, nausea, vomiting, diarrhea, constipation    Genitourinary:  Positive pelvic pain, flank pain and pressure after urination   Denies frequency/urgency, dysuria or urinary retension  Musculoskeletal:  Positive for right leg myalgia   Denies arthalgia or muscle weakness    Integumentary:  Denies ecchymosis, petechiae ,rash or lesions   Neurological:  Denies headaches, dizziness, confusion, loss of consciousness or behavioral changes  Psychological:  Denies anxiety, depression or sleep disturbances      OBJECTIVE    /72   Pulse 84   Temp 98 4 °F (36 9 °C)   Ht 5' 2" (1 575 m)   Wt 60 3 kg (133 lb)   BMI 24 33 kg/m²     Constitutional:  Well appearing and in no acute distress  Pulmonary:  clear to auscultation bilaterally and no crackles, no wheezes, chest expansion normal  Cardiovascular:  S1S2, regular rate and rhythm  Gastrointestinal:  abdomen is soft without significant tenderness, no CVA tenderness, no guarding or rigidity and no rebound tenderness      Genitourinary:  Point of Care urine dip results positive for leukocytes  Positive right pelvic pain on palpation radiating to right lower flank  Lymphatic:  no lymphadenopathy   Musculoskeletal:  no muscular tenderness noted  Skin:  skin color, texture and turgor are normal; no bruising, rashes or lesions noted  Neurologic:  Alert and oriented x 4 and Affect and mood normal      Assessment/Plan:  Diagnoses and all orders for this visit:    Urinary tract infection without hematuria, site unspecified  -     nitrofurantoin (MACROBID) 100 mg capsule;  Take 1 capsule (100 mg total) by mouth 2 (two) times a day    Right flank pain  -     POCT urine dip      #1 Urinary tract infection  Reviewed with patient plan to treat with nitrofurantoin 100 mg twice a day for 7 days  #2 Right flank pain  Obtained POCT urine dip - positive for leucocytes  Patient instructed to call in 72 hours if not feeling better or if symptoms worsen

## 2018-07-05 DIAGNOSIS — F32.A DEPRESSION, UNSPECIFIED DEPRESSION TYPE: Primary | ICD-10-CM

## 2018-07-05 RX ORDER — CITALOPRAM 40 MG/1
40 TABLET ORAL DAILY
Qty: 90 TABLET | Refills: 3 | Status: SHIPPED | OUTPATIENT
Start: 2018-07-05 | End: 2019-07-05 | Stop reason: SDUPTHER

## 2018-07-13 DIAGNOSIS — F41.9 ANXIETY: ICD-10-CM

## 2018-07-14 RX ORDER — ALPRAZOLAM 0.5 MG/1
0.5 TABLET ORAL
Qty: 30 TABLET | Refills: 0 | Status: SHIPPED | OUTPATIENT
Start: 2018-07-14 | End: 2018-08-16 | Stop reason: SDUPTHER

## 2018-08-16 DIAGNOSIS — F41.9 ANXIETY: ICD-10-CM

## 2018-08-17 RX ORDER — ALPRAZOLAM 0.5 MG/1
TABLET ORAL
Qty: 30 TABLET | Refills: 0 | Status: SHIPPED | OUTPATIENT
Start: 2018-08-17 | End: 2018-09-19 | Stop reason: SDUPTHER

## 2018-09-05 ENCOUNTER — TELEPHONE (OUTPATIENT)
Dept: FAMILY MEDICINE CLINIC | Facility: CLINIC | Age: 53
End: 2018-09-05

## 2018-09-11 DIAGNOSIS — K21.9 GASTROESOPHAGEAL REFLUX DISEASE WITHOUT ESOPHAGITIS: ICD-10-CM

## 2018-09-11 RX ORDER — PANTOPRAZOLE SODIUM 40 MG/1
40 TABLET, DELAYED RELEASE ORAL DAILY
Qty: 30 TABLET | Refills: 3 | Status: SHIPPED | OUTPATIENT
Start: 2018-09-11 | End: 2018-09-12 | Stop reason: SDUPTHER

## 2018-09-12 ENCOUNTER — OFFICE VISIT (OUTPATIENT)
Dept: FAMILY MEDICINE CLINIC | Facility: CLINIC | Age: 53
End: 2018-09-12
Payer: COMMERCIAL

## 2018-09-12 VITALS
HEART RATE: 84 BPM | BODY MASS INDEX: 24.84 KG/M2 | TEMPERATURE: 98.8 F | WEIGHT: 135 LBS | HEIGHT: 62 IN | DIASTOLIC BLOOD PRESSURE: 64 MMHG | SYSTOLIC BLOOD PRESSURE: 108 MMHG

## 2018-09-12 DIAGNOSIS — K21.9 GASTROESOPHAGEAL REFLUX DISEASE WITHOUT ESOPHAGITIS: ICD-10-CM

## 2018-09-12 DIAGNOSIS — Z23 NEED FOR VACCINATION: ICD-10-CM

## 2018-09-12 DIAGNOSIS — Z00.00 ENCOUNTER FOR WELL ADULT EXAM WITHOUT ABNORMAL FINDINGS: Primary | ICD-10-CM

## 2018-09-12 PROCEDURE — 90686 IIV4 VACC NO PRSV 0.5 ML IM: CPT | Performed by: FAMILY MEDICINE

## 2018-09-12 PROCEDURE — 99396 PREV VISIT EST AGE 40-64: CPT | Performed by: FAMILY MEDICINE

## 2018-09-12 PROCEDURE — 90471 IMMUNIZATION ADMIN: CPT | Performed by: FAMILY MEDICINE

## 2018-09-12 PROCEDURE — 86580 TB INTRADERMAL TEST: CPT | Performed by: FAMILY MEDICINE

## 2018-09-12 RX ORDER — PANTOPRAZOLE SODIUM 40 MG/1
40 TABLET, DELAYED RELEASE ORAL DAILY
Qty: 30 TABLET | Refills: 0 | Status: SHIPPED | OUTPATIENT
Start: 2018-09-12 | End: 2018-09-13 | Stop reason: SDUPTHER

## 2018-09-13 RX ORDER — PANTOPRAZOLE SODIUM 40 MG/1
TABLET, DELAYED RELEASE ORAL
Qty: 60 TABLET | Refills: 0
Start: 2018-09-13 | End: 2018-12-31 | Stop reason: SDUPTHER

## 2018-09-13 NOTE — PROGRESS NOTES
Patient ID: Kavita Armenta is a 48 y o  female  HPI: 48 y  o female presents for a well work physical   She complains of acid reflux on once a day protonix  She has breakthrough symptoms several times per week  She is in need of a PPD today and flu shot  SUBJECTIVE    Family History   Problem Relation Age of Onset    Breast cancer Mother         dx in 57's    Other Mother         stenosis    Scleroderma Father     Breast cancer Maternal Grandmother         dx in [de-identified] Breast cancer Family     Other Family         back disorder     Social History     Social History    Marital status: /Civil Union     Spouse name: N/A    Number of children: N/A    Years of education: N/A     Occupational History    Not on file  Social History Main Topics    Smoking status: Never Smoker    Smokeless tobacco: Never Used    Alcohol use Yes      Comment: daily    Drug use: No    Sexual activity: Yes     Partners: Male     Other Topics Concern    Not on file     Social History Narrative    Drinks coffee daily     Drinks cola daily    Drinks tea daily         Past Medical History:   Diagnosis Date    Anxiety     Arthritis     Asthma     GERD (gastroesophageal reflux disease)     Irritable bowel syndrome     PONV (postoperative nausea and vomiting)      Past Surgical History:   Procedure Laterality Date    BREAST SURGERY Bilateral     cyst removal    COLONOSCOPY      [place] ESOPHAGOGASTRODUODENOSCOPY TRANSORAL DIAGNOSTIC N/A 1/2/2018    Procedure: EGD AND COLONOSCOPY;  Surgeon: Ernie Shankar MD;  [facility];   Service: Gastroenterology    TONSILLECTOMY      WISDOM TOOTH EXTRACTION       No Known Allergies    Current Outpatient Prescriptions:     ALPRAZolam (XANAX) 0 5 mg tablet, TAKE 1 TABLET BY MOUTH AT BEDTIME AS NEEDED FOR ANXIETY, Disp: 30 tablet, Rfl: 0    cetirizine (ZYRTEC ALLERGY) 10 mg tablet, Take by mouth, Disp: , Rfl:     Cholecalciferol (VITAMIN D3) 2000 units TABS, Take 1 tablet by mouth daily, Disp: , Rfl:     citalopram (CeleXA) 40 mg tablet, Take 1 tablet by mouth daily, Disp: , Rfl:     clobetasol (TEMOVATE) 0 05 % ointment, Apply topically 2 (two) times a day, Disp: , Rfl:     norethindrone-ethinyl estradiol (FEMHRT LOW DOSE) 0 5-2 5 MG-MCG per tablet, Take 1 tablet by mouth daily, Disp: , Rfl:     pantoprazole (PROTONIX) 40 mg tablet, 1 po bid, Disp: 60 tablet, Rfl: 0    sucralfate (CARAFATE) 1 g tablet, Take 1 g by mouth 4 (four) times a day, Disp: , Rfl:     citalopram (CeleXA) 40 mg tablet, Take 1 tablet (40 mg total) by mouth daily (Patient not taking: Reported on 9/12/2018 ), Disp: 90 tablet, Rfl: 3    nitrofurantoin (MACROBID) 100 mg capsule, Take 1 capsule (100 mg total) by mouth 2 (two) times a day (Patient not taking: Reported on 9/12/2018 ), Disp: 14 capsule, Rfl: 0    Review of Systems  Constitutional:     Denies fever, chills ,fatigue ,weakness ,weight loss, weight gain     ENT: Denies earache ,loss of hearing ,nosebleed, nasal discharge,nasal congestion ,sore throat ,hoarseness  Pulmonary: Denies shortness of breath ,cough  ,dyspnea on exertion, orthopnea  ,PND   Cardiovascular:  Denies bradycardia , tachycardia  ,palpations, lower extremity edema leg, claudication  Breast:  Denies new or changing breast lumps ,nipple discharge ,nipple changes  Abdomen:  Denies abdominal pain , anorexia , indigestion, nausea, vomiting, constipation, diarrhea + GERD  Musculoskeletal: Denies myalgias, arthralgias, joint swelling, joint stiffness , limb pain, limb swelling  Gu: denies dysuria, polyuria  Skin: Denies skin rash, skin lesion, skin wound, itching, dry skin  Neuro: Denies headache, numbness, tingling, confusion, loss of consciousness, dizziness, vertigo  Psychiatric: Denies feelings of depression, suicidal ideation, anxiety, sleep disturbances    OBJECTIVE  /64   Pulse 84   Temp 98 8 °F (37 1 °C)   Ht 5' 2" (1 575 m)   Wt 61 2 kg (135 lb) BMI 24 69 kg/m²   Constitutional:   NAD, well appearing and well nourished      ENT:   Conjunctiva and lids: no injection, edema, or discharge     Pupils and iris: NAVDEEP bilaterally    External inspection of ears and nose: normal without deformities or discharge  Otoscopic exam: Canals patent without erythema  Nasal mucosa, septum and turbinates: Normal or edema or discharge         Oropharynx:  Moist mucosa, normal tongue and tonsils without lesions  No erythema        Pulmonary:Respiratory effort normal rate and rhythm, no increased work of breathing  Auscultation of lungs:  Clear bilaterally with no adventitious breath sounds       Cardiovascular: regular rate and rhythm, S1 and S2, no murmur, no edema and/or varicosities of LE      Abdomen: Soft and non-distended     Positive bowel sounds      No heptomegaly or splenomegaly      Gu: no suprapubic tenderness or CVA tenderness, no urethral discharge  Lymphatic:  No anterior or posterior cervical lymphadenopathy         Musculoskeletal:  Gait and station: Normal gait      Digits and nails normal without clubbing or cyanosis       Inspection/palpation of joints, bones, and muscles:  No joint tenderness, swelling, full active and passive range of motion       Skin: Normal skin turgor and no rashes      Neuro:    Normal reflexes     Psych:   alert and oriented to person, place and time     normal mood and affect       Assessment/Plan:Diagnoses and all orders for this visit:    Encounter for well adult exam without abnormal findings    Gastroesophageal reflux disease without esophagitis  -     Discontinue: pantoprazole (PROTONIX) 40 mg tablet;  Take 1 tablet (40 mg total) by mouth daily  -     pantoprazole (PROTONIX) 40 mg tablet; 1 po bid    Need for vaccination  -     TB Skin Test  -     Cancel: influenza vaccine, [id number], quadrivalent, recombinant, PF, 0 5 mL, for patients 18 yr+ (FLUBLOK)  -     SYRINGE/SINGLE-DOSE VIAL: influenza vaccine, [id number], quadrivalent, 0 5 mL, preservative-free, for patients 3+ yr (FLUZONE)        Reviewed with patient plan to treat with the above      Patient instructed to call in 2 weeks if not feeling better or if symptoms worsen

## 2018-09-14 ENCOUNTER — CLINICAL SUPPORT (OUTPATIENT)
Dept: FAMILY MEDICINE CLINIC | Facility: CLINIC | Age: 53
End: 2018-09-14

## 2018-09-14 DIAGNOSIS — Z11.1 ENCOUNTER FOR PPD SKIN TEST READING: Primary | ICD-10-CM

## 2018-09-14 LAB
INDURATION: 0 MM
TB SKIN TEST: NEGATIVE

## 2018-09-19 DIAGNOSIS — F41.9 ANXIETY: ICD-10-CM

## 2018-09-20 RX ORDER — ALPRAZOLAM 0.5 MG/1
TABLET ORAL
Qty: 30 TABLET | Refills: 0 | Status: SHIPPED | OUTPATIENT
Start: 2018-09-20 | End: 2018-10-25 | Stop reason: SDUPTHER

## 2018-10-25 DIAGNOSIS — F41.9 ANXIETY: ICD-10-CM

## 2018-10-26 RX ORDER — ALPRAZOLAM 0.5 MG/1
0.5 TABLET ORAL
Qty: 30 TABLET | Refills: 0 | Status: SHIPPED | OUTPATIENT
Start: 2018-10-26 | End: 2018-11-26 | Stop reason: SDUPTHER

## 2018-11-11 DIAGNOSIS — Z30.41 SURVEILLANCE OF CONTRACEPTIVE PILL: Primary | ICD-10-CM

## 2018-11-12 RX ORDER — NORETHINDRONE ACETATE AND ETHINYL ESTRADIOL .5; 2.5 MG/1; UG/1
TABLET ORAL
Qty: 28 TABLET | Refills: 5 | Status: SHIPPED | OUTPATIENT
Start: 2018-11-12 | End: 2019-02-19 | Stop reason: SDUPTHER

## 2018-11-26 DIAGNOSIS — F41.9 ANXIETY: ICD-10-CM

## 2018-11-26 RX ORDER — ALPRAZOLAM 0.5 MG/1
0.5 TABLET ORAL
Qty: 30 TABLET | Refills: 0 | Status: SHIPPED | OUTPATIENT
Start: 2018-11-26 | End: 2018-12-27 | Stop reason: SDUPTHER

## 2018-12-05 ENCOUNTER — OFFICE VISIT (OUTPATIENT)
Dept: FAMILY MEDICINE CLINIC | Facility: CLINIC | Age: 53
End: 2018-12-05
Payer: COMMERCIAL

## 2018-12-05 VITALS — TEMPERATURE: 98.5 F | HEIGHT: 62 IN | BODY MASS INDEX: 24.69 KG/M2

## 2018-12-05 DIAGNOSIS — J01.90 ACUTE SINUSITIS, RECURRENCE NOT SPECIFIED, UNSPECIFIED LOCATION: Primary | ICD-10-CM

## 2018-12-05 PROCEDURE — 99213 OFFICE O/P EST LOW 20 MIN: CPT | Performed by: FAMILY MEDICINE

## 2018-12-05 RX ORDER — AMOXICILLIN AND CLAVULANATE POTASSIUM 875; 125 MG/1; MG/1
1 TABLET, FILM COATED ORAL EVERY 12 HOURS SCHEDULED
Qty: 20 TABLET | Refills: 0 | Status: SHIPPED | OUTPATIENT
Start: 2018-12-05 | End: 2018-12-15

## 2018-12-05 RX ORDER — PREDNISONE 10 MG/1
TABLET ORAL
Qty: 26 TABLET | Refills: 0 | Status: SHIPPED | OUTPATIENT
Start: 2018-12-05 | End: 2019-02-19

## 2018-12-05 NOTE — PROGRESS NOTES
Patient ID: Kentrell Meneses is a 48 y o  female  HPI: 48 y  o female presenting with symptoms of sinus pain,pressure, nasal congestion, pnd dry cough , ear and throat pain  SUBJECTIVE    Family History   Problem Relation Age of Onset    Breast cancer Mother         dx in 57's    Other Mother         stenosis    Scleroderma Father     Breast cancer Maternal Grandmother         dx in [de-identified] Breast cancer Family     Other Family         back disorder     Social History     Social History    Marital status: /Civil Union     Spouse name: N/A    Number of children: N/A    Years of education: N/A     Occupational History    Not on file  Social History Main Topics    Smoking status: Never Smoker    Smokeless tobacco: Never Used    Alcohol use Yes      Comment: daily    Drug use: No    Sexual activity: Yes     Partners: Male     Other Topics Concern    Not on file     Social History Narrative    Drinks coffee daily     Drinks cola daily    Drinks tea daily         Past Medical History:   Diagnosis Date    Anxiety     Arthritis     Asthma     GERD (gastroesophageal reflux disease)     Irritable bowel syndrome     PONV (postoperative nausea and vomiting)      Past Surgical History:   Procedure Laterality Date    BREAST SURGERY Bilateral     cyst removal    COLONOSCOPY      [place] ESOPHAGOGASTRODUODENOSCOPY TRANSORAL DIAGNOSTIC N/A 1/2/2018    Procedure: EGD AND COLONOSCOPY;  Surgeon: Joyce Cruz MD;  [facility];   Service: Gastroenterology    TONSILLECTOMY      WISDOM TOOTH EXTRACTION       No Known Allergies    Current Outpatient Prescriptions:     ALPRAZolam (XANAX) 0 5 mg tablet, Take 1 tablet (0 5 mg total) by mouth daily at bedtime as needed for anxiety, Disp: 30 tablet, Rfl: 0    amoxicillin-clavulanate (AUGMENTIN) 875-125 mg per tablet, Take 1 tablet by mouth every 12 (twelve) hours for 10 days, Disp: 20 tablet, Rfl: 0    cetirizine (ZYRTEC ALLERGY) 10 mg tablet, Take by mouth, Disp: , Rfl:     Cholecalciferol (VITAMIN D3) 2000 units TABS, Take 1 tablet by mouth daily, Disp: , Rfl:     citalopram (CeleXA) 40 mg tablet, Take 1 tablet by mouth daily, Disp: , Rfl:     citalopram (CeleXA) 40 mg tablet, Take 1 tablet (40 mg total) by mouth daily (Patient not taking: Reported on 9/12/2018 ), Disp: 90 tablet, Rfl: 3    clobetasol (TEMOVATE) 0 05 % ointment, Apply topically 2 (two) times a day, Disp: , Rfl:     nitrofurantoin (MACROBID) 100 mg capsule, Take 1 capsule (100 mg total) by mouth 2 (two) times a day (Patient not taking: Reported on 9/12/2018 ), Disp: 14 capsule, Rfl: 0    norethindrone-ethinyl estradiol (FEMHRT LOW DOSE) 0 5-2 5 MG-MCG per tablet, TAKE 1 TABLET BY MOUTH EVERY DAY AS DIRECTED, Disp: 28 tablet, Rfl: 5    pantoprazole (PROTONIX) 40 mg tablet, 1 po bid, Disp: 60 tablet, Rfl: 0    predniSONE 10 mg tablet, 3 tabs po bid x2 days, then 2 tabs po bid x2 days, then 1 tab bid x2 days, then 1 daily until done , Disp: 26 tablet, Rfl: 0    sucralfate (CARAFATE) 1 g tablet, Take 1 g by mouth 4 (four) times a day, Disp: , Rfl:     Review of Systems  Constitutional:     Denies fever, chills, fatigue, weakness ,weight loss, weight gain      ENT: Denies earache, loss of hearing, nosebleed, nasal discharge,but complains of nasal congestion, sore throat,hoarseness and sinus pain and pressure    Pulmonary: Denies shortness of breath ,cough , dyspnea on exertionon, orthopnea ,+ PND   Cardiovascular:  Denies bradycardia , tachycardia ,palpations, lower extremity, edema leg, claudication  Breast:  Denies new or changing breast lumps,  nipple discharge, nipple changes,  Abdomen:  Denies abdominal pain , anorexia ,indigestion, nausea ,vomiting, constipation , diarrhea  Musculoskeletal: Denies myalgias, arthralgias, joint swelling, joint stiffness ,limb pain, limb swelling  Lymph:+ swollen glands  Gu: no dysuria or urinary frequency  Skin: Denies skin rash, skin lesion, skin wound, itching,dry skin  Neuro: Denies headache, numbness, tingling, confusion, loss of consciousness, dizziness ,vertigo  Psychiatric: Denies feelings of depression, suicidal ideation, anxiety, sleep disturbances    OBJECTIVE  Temp 98 5 °F (36 9 °C)   Ht 5' 2" (1 575 m)   BMI 24 69 kg/m²   Constitutional:   NAD, well appearing and well nourished      ENT:   Conjunctiva and lids: no injection, edema, or discharge    Pupils and iris: NAVDEEP bilaterally   External inspection of ears and nose: normal without deformities or discharge  Otoscopic exam: Canals patent ; tm are dull, with with erythem and effusions  ENasal mucosa, septum and turbinates: Turbinae injection with discharge   Oropharynx:  Moist mucosa, normal tongue and tonsils without lesions  Erythema and injection  of post pharynx with pnd      Pulmonary:Respiratory effort normal rate and rhythm, no increased work of breathing   Auscultation of lungs:  Clear bilaterally with no adventitious breath sounds       Cardiovascular: regular rate and rhythm, S1 and S2, no murmur, no edema and/or varicosities of LE      Abdomen: Soft and non-distended    Positive bowel sounds    No heptomegaly or splenomegaly    Lymphatic: Anterior  cervical lymphadenopathy         Muscskeletal:  Gait and station: Normal gait     Digits and nails normal without clubbing or cyanosis     Inspection/palpation of joints, bones, and muscles:  No joint tenderness, swelling, full active and passive range of motion      Gu: no suprabubic tenderness, CVA tenderness or urethral discharge  Skin: Normal skin turgor and no rashes    Neuro:    Normal reflexes   Psych:   alert and oriented to person, place and time  normal mood and affec      Assessment/Plan:Diagnoses and all orders for this visit:    Acute sinusitis, recurrence not specified, unspecified location  -     predniSONE 10 mg tablet; 3 tabs po bid x2 days, then 2 tabs po bid x2 days, then 1 tab bid x2 days, then 1 daily until done  -     amoxicillin-clavulanate (AUGMENTIN) 875-125 mg per tablet; Take 1 tablet by mouth every 12 (twelve) hours for 10 days        Reviewed with patient plan to treat with above plan      Patient instructed to call in 72 hours if not feeling better or if symptoms worsen

## 2018-12-27 DIAGNOSIS — F41.9 ANXIETY: ICD-10-CM

## 2018-12-27 RX ORDER — ALPRAZOLAM 0.5 MG/1
0.5 TABLET ORAL
Qty: 30 TABLET | Refills: 0 | Status: SHIPPED | OUTPATIENT
Start: 2018-12-27 | End: 2019-02-01 | Stop reason: SDUPTHER

## 2018-12-31 DIAGNOSIS — K21.9 GASTROESOPHAGEAL REFLUX DISEASE WITHOUT ESOPHAGITIS: ICD-10-CM

## 2018-12-31 RX ORDER — PANTOPRAZOLE SODIUM 40 MG/1
TABLET, DELAYED RELEASE ORAL
Qty: 60 TABLET | Refills: 3 | Status: SHIPPED | OUTPATIENT
Start: 2018-12-31 | End: 2019-07-17 | Stop reason: SDUPTHER

## 2019-01-09 ENCOUNTER — TELEPHONE (OUTPATIENT)
Dept: FAMILY MEDICINE CLINIC | Facility: CLINIC | Age: 54
End: 2019-01-09

## 2019-01-09 DIAGNOSIS — N39.0 URINARY TRACT INFECTION WITHOUT HEMATURIA, SITE UNSPECIFIED: Primary | ICD-10-CM

## 2019-01-09 RX ORDER — CIPROFLOXACIN 250 MG/1
250 TABLET, FILM COATED ORAL EVERY 12 HOURS SCHEDULED
Qty: 20 TABLET | Refills: 0 | Status: SHIPPED | OUTPATIENT
Start: 2019-01-09 | End: 2019-01-19

## 2019-01-09 NOTE — TELEPHONE ENCOUNTER
Wants rx  Has frequent urination, burning , has hx uti  Taking advil and drinking cranberry juice  [address]  Will ck with pharm if no cb

## 2019-02-01 DIAGNOSIS — F41.9 ANXIETY: ICD-10-CM

## 2019-02-19 ENCOUNTER — ANNUAL EXAM (OUTPATIENT)
Dept: OBGYN CLINIC | Facility: CLINIC | Age: 54
End: 2019-02-19
Payer: COMMERCIAL

## 2019-02-19 VITALS
HEIGHT: 62 IN | SYSTOLIC BLOOD PRESSURE: 128 MMHG | BODY MASS INDEX: 25.4 KG/M2 | WEIGHT: 138 LBS | DIASTOLIC BLOOD PRESSURE: 84 MMHG

## 2019-02-19 DIAGNOSIS — N95.1 PERIMENOPAUSAL VASOMOTOR SYMPTOMS: ICD-10-CM

## 2019-02-19 DIAGNOSIS — N39.0 POSTCOITAL URINARY TRACT INFECTION: ICD-10-CM

## 2019-02-19 DIAGNOSIS — Z12.31 ENCOUNTER FOR SCREENING MAMMOGRAM FOR MALIGNANT NEOPLASM OF BREAST: ICD-10-CM

## 2019-02-19 DIAGNOSIS — Z01.419 ENCOUNTER FOR GYNECOLOGICAL EXAMINATION (GENERAL) (ROUTINE) WITHOUT ABNORMAL FINDINGS: Primary | ICD-10-CM

## 2019-02-19 PROBLEM — R23.2 HOT FLASHES: Status: RESOLVED | Noted: 2017-09-06 | Resolved: 2019-02-19

## 2019-02-19 PROBLEM — R87.615 UNSATISFACTORY CERVICAL PAPANICOLAOU SMEAR: Status: RESOLVED | Noted: 2018-04-12 | Resolved: 2019-02-19

## 2019-02-19 PROBLEM — N92.4 ABNORMAL PERIMENOPAUSAL BLEEDING: Status: RESOLVED | Noted: 2018-02-14 | Resolved: 2019-02-19

## 2019-02-19 PROCEDURE — S0612 ANNUAL GYNECOLOGICAL EXAMINA: HCPCS | Performed by: PHYSICIAN ASSISTANT

## 2019-02-19 RX ORDER — NORETHINDRONE ACETATE AND ETHINYL ESTRADIOL .5; 2.5 MG/1; UG/1
1 TABLET ORAL DAILY
Qty: 28 TABLET | Refills: 11 | Status: SHIPPED | OUTPATIENT
Start: 2019-02-19 | End: 2020-02-25 | Stop reason: SDUPTHER

## 2019-02-19 RX ORDER — IBUPROFEN 200 MG
TABLET ORAL EVERY 6 HOURS PRN
COMMUNITY
End: 2019-08-07 | Stop reason: ALTCHOICE

## 2019-02-19 RX ORDER — COVID-19 ANTIGEN TEST
KIT MISCELLANEOUS
COMMUNITY

## 2019-02-19 RX ORDER — NITROFURANTOIN 25; 75 MG/1; MG/1
100 CAPSULE ORAL ONCE AS NEEDED
Qty: 30 CAPSULE | Refills: 0 | Status: SHIPPED | OUTPATIENT
Start: 2019-02-19 | End: 2019-08-07 | Stop reason: ALTCHOICE

## 2019-02-19 NOTE — PROGRESS NOTES
Assessment/Plan   Diagnoses and all orders for this visit:    Encounter for gynecological examination (general) (routine) without abnormal findings  The current ASCCP guidelines were reviewed  Patient's last pap was 2/14/18 - unsatisfactory (-) HRHPV type 16/18 negative; 4/12/18 - pap WNL and therefore, a pap with HPV cotesting is not indicated at this time  I emphasized the importance of an annual pelvic and breast exam  Patient ok to defer pap today  Perimenopausal vasomotor symptoms  -     norethindrone-ethinyl estradiol (FEMHRT LOW DOSE) 0 5-2 5 MG-MCG per tablet; Take 1 tablet by mouth daily As directed  Continue Femhrt 1 tab po daily  Discussed risks associated with hormone therapy, specifically for breast cancer risk and thromoembolic risks and the goal is to use the lowest effective dose for the shortest duration of time  Patient states her QOL and symptoms are significantly improved on the hormone therapy and would like to continue despite risks  We reviewed trial of every other day if desires  Will re-evaluate therapy each year  Postcoital urinary tract infection  -     nitrofurantoin (MACROBID) 100 mg capsule; Take 1 capsule (100 mg total) by mouth once as needed (for UTI prophylaxis after IC) for up to 1 dose  Encourage voiding after IC - increase fluid intake/cranberry juice and taking one tablet of macrobid after IC for UTI prophylaxis  Encounter for screening mammogram for malignant neoplasm of breast  -     Mammo screening bilateral w 3d & cad; Future  A yearly mammogram is recommended for breast cancer screening starting at age 36;    Discussion  I have discussed the importance of monthly self-breast exams, exercise and healthy diet as well as adequate intake of calcium and vitamin D  Encourage MVI q day and r/stephanie importance of folic acid;  Encourage 30-40 min weight bearing exercise most days of week  Encourage safe sexual practices; STD testing - declines  Contraception -  patient aware Femhrt is not a contraceptive and we recommend contraceptive like condom until she goes one full year without any bleeding/spotting  In addition, colon cancer screening with a colonoscopy is recommended starting at age 39 and reviewed benefits  All questions have been answered to her satisfaction  RTO for APE or sooner if needed    Subjective     HPI   Jaun Perdomo is a 48 y o  female who presents for annual well woman exam    Menarche - 17; LMP - 7/15/2018; Periods have been coming once or twice/year - in 2018 went almost one full year and had a period in the 11th month last Feb; No excessive bleeding; No intermenstrual bleeding or spotting; Cramps are tolerable  Last pelvic ultrasound done 2/23/18 for irregular bleeding - found fibroid and possible adenomyosis - 10 mm endometrium - offered endometrial biopsy since so close to menopause which she never had done; Patient was started on Femhrt last year to help with perimenopausal vasomotor symptoms  No vulvar itch/burn; No vaginal itch/burn; No abn discharge or odor; No urinary sx - burning/pain/frequency/hematuria - frequently getting UTI symptoms after IC;  (-) SBEs - no asymmetry, nipple discharge or bleeding, changes in skin of breast, or breast tenderness bilaterally  No abd/pelvic pain or HAs;   Still very tired and diaz and typically anxious; (+) weight gain - cannot lose despite diet and regular exercise; No vasomotor symptoms on Femhrt - has made a big difference - No hot flashes/night sweats; (+) problems with intercourse/ vaginal dryness - uses coconut oil; sleeping well;   Pt is sexually active in a mutually monog/ sexual relationship; No issues with intercourse; She declines std/hiv/hep testing; Feels safe at home  Current contraception: none - patient aware Femhrt is not a contraceptive and we recommend contraceptive like condom until she goes one full year without any bleeding/spotting  Condom use: no  (+) PCP for routine Bw/care;     Last Pap - 18 - unsatisfactory (-) HRHPV type 16/18 negative; 18 - pap WNL  History of abnormal Pap smear: no  Last mammo - 2/15/18 - Birads 1 negative  History of abnormal mammogram: yes - s/p B/L breast cystectomy  Last colonoscopy -  - WNL per patient - follow-up in 10 years; Review of Systems   Constitutional: Positive for unexpected weight change (weight gain)  Negative for activity change, fatigue and fever  HENT: Negative for congestion, dental problem, sinus pressure and sinus pain  Eyes: Negative for visual disturbance  Respiratory: Negative for cough, shortness of breath and wheezing  Cardiovascular: Negative for chest pain and leg swelling  Gastrointestinal: Negative for abdominal distention, abdominal pain, blood in stool, constipation, diarrhea, nausea and vomiting  Endocrine: Negative for polydipsia  Genitourinary: Negative for difficulty urinating, dyspareunia, dysuria, frequency, hematuria, menstrual problem, pelvic pain, urgency, vaginal bleeding, vaginal discharge and vaginal pain  Musculoskeletal: Negative for arthralgias and back pain  Allergic/Immunologic: Negative for environmental allergies  Neurological: Negative for dizziness, seizures and headaches  Psychiatric/Behavioral: Negative for dysphoric mood and sleep disturbance  The patient is not nervous/anxious          The following portions of the patient's history were reviewed and updated as appropriate: allergies, current medications, past family history, past medical history, past social history, past surgical history and problem list          OB History        1    Para   1    Term   1            AB        Living   1       SAB        TAB        Ectopic        Multiple        Live Births   1           Obstetric Comments   : 38  F             Past Medical History:   Diagnosis Date    Anxiety     Arthritis     Asthma     GERD (gastroesophageal reflux disease)     Irritable bowel syndrome     PONV (postoperative nausea and vomiting)     Skin cancer        Past Surgical History:   Procedure Laterality Date    BREAST SURGERY Bilateral     cyst removal    COLONOSCOPY      [place] ESOPHAGOGASTRODUODENOSCOPY TRANSORAL DIAGNOSTIC N/A 1/2/2018    Procedure: EGD AND COLONOSCOPY;  Surgeon: Stephania Hdz MD;  [facility];   Service: Gastroenterology    TONSILLECTOMY      WISDOM TOOTH EXTRACTION         Family History   Problem Relation Age of Onset    Breast cancer Mother         dx in 57's    Other Mother         stenosis    Scleroderma Father     Breast cancer Maternal Grandmother         dx in [de-identified]    Breast cancer Family     Other Family         back disorder       Social History     Socioeconomic History    Marital status: /Civil Union     Spouse name: Not on file    Number of children: Not on file    Years of education: Not on file    Highest education level: Not on file   Occupational History    Not on file   Social Needs    Financial resource strain: Not on file    Food insecurity:     Worry: Not on file     Inability: Not on file    Transportation needs:     Medical: Not on file     Non-medical: Not on file   Tobacco Use    Smoking status: Never Smoker    Smokeless tobacco: Never Used   Substance and Sexual Activity    Alcohol use: Yes     Comment: daily    Drug use: No    Sexual activity: Yes     Partners: Male   Lifestyle    Physical activity:     Days per week: Not on file     Minutes per session: Not on file    Stress: Not on file   Relationships    Social connections:     Talks on phone: Not on file     Gets together: Not on file     Attends Oriental orthodox service: Not on file     Active member of club or organization: Not on file     Attends meetings of clubs or organizations: Not on file     Relationship status: Not on file    Intimate partner violence:     Fear of current or ex partner: Not on file     Emotionally abused: Not on file Physically abused: Not on file     Forced sexual activity: Not on file   Other Topics Concern    Not on file   Social History Narrative    Drinks coffee daily     Drinks cola daily    Drinks tea daily         Current Outpatient Medications:     ALPRAZolam (XANAX) 0 5 mg tablet, Take 1 tablet (0 5 mg total) by mouth daily at bedtime as needed for anxiety, Disp: 30 tablet, Rfl: 0    cetirizine (ZYRTEC ALLERGY) 10 mg tablet, Take by mouth, Disp: , Rfl:     citalopram (CeleXA) 40 mg tablet, Take 1 tablet (40 mg total) by mouth daily, Disp: 90 tablet, Rfl: 3    ibuprofen (MOTRIN) 200 mg tablet, Take by mouth every 6 (six) hours as needed for mild pain, Disp: , Rfl:     Naproxen Sodium (ALEVE) 220 MG CAPS, Take by mouth, Disp: , Rfl:     norethindrone-ethinyl estradiol (FEMHRT LOW DOSE) 0 5-2 5 MG-MCG per tablet, TAKE 1 TABLET BY MOUTH EVERY DAY AS DIRECTED, Disp: 28 tablet, Rfl: 5    pantoprazole (PROTONIX) 40 mg tablet, 1 po bid, Disp: 60 tablet, Rfl: 3    Cholecalciferol (VITAMIN D3) 2000 units TABS, Take 1 tablet by mouth daily, Disp: , Rfl:     clobetasol (TEMOVATE) 0 05 % ointment, Apply topically 2 (two) times a day, Disp: , Rfl:     nitrofurantoin (MACROBID) 100 mg capsule, Take 1 capsule (100 mg total) by mouth 2 (two) times a day (Patient not taking: Reported on 9/12/2018 ), Disp: 14 capsule, Rfl: 0    predniSONE 10 mg tablet, 3 tabs po bid x2 days, then 2 tabs po bid x2 days, then 1 tab bid x2 days, then 1 daily until done , Disp: 26 tablet, Rfl: 0    sucralfate (CARAFATE) 1 g tablet, Take 1 g by mouth 4 (four) times a day, Disp: , Rfl:     No Known Allergies    Objective   Vitals:    02/19/19 0903   BP: 128/84   BP Location: Left arm   Patient Position: Sitting   Cuff Size: Adult   Weight: 62 6 kg (138 lb)   Height: 5' 2" (1 575 m)     Physical Exam   Constitutional: She appears well-developed and well-nourished  No distress  Neck: No thyromegaly present     Cardiovascular: Normal rate, regular rhythm and normal heart sounds  No murmur heard  Pulmonary/Chest: Effort normal and breath sounds normal  No respiratory distress  She has no wheezes  Right breast exhibits no inverted nipple, no mass, no nipple discharge, no skin change and no tenderness  Left breast exhibits no inverted nipple, no mass, no nipple discharge, no skin change and no tenderness  Breasts are symmetrical    Abdominal: Soft  She exhibits no distension and no mass  There is no tenderness  Genitourinary: Vagina normal and uterus normal  Rectal exam shows no mass, no tenderness, anal tone normal and guaiac negative stool  Pelvic exam was performed with patient supine  There is no rash, tenderness or lesion on the right labia  There is no rash, tenderness or lesion on the left labia  Uterus is not deviated, not enlarged, not fixed and not tender  Cervix exhibits no motion tenderness, no discharge and no friability  Right adnexum displays no mass, no tenderness and no fullness  Left adnexum displays no mass, no tenderness and no fullness  No erythema, tenderness or bleeding in the vagina  No foreign body in the vagina  No vaginal discharge found  Genitourinary Comments: Moderate vaginal atrophy noted   Musculoskeletal: She exhibits no edema  Lymphadenopathy:     She has no cervical adenopathy  She has no axillary adenopathy  Right: No inguinal adenopathy present  Left: No inguinal adenopathy present  Neurological: She is alert  Skin: Skin is warm  She is not diaphoretic  Psychiatric: She has a normal mood and affect  Her behavior is normal    Vitals reviewed

## 2019-02-19 NOTE — ASSESSMENT & PLAN NOTE
The current ASCCP guidelines were reviewed  Patient's last pap was 2/14/18 - unsatisfactory (-) HRHPV type 16/18 negative; 4/12/18 - pap WNL and therefore, a pap with HPV cotesting is not indicated at this time   I emphasized the importance of an annual pelvic and breast exam  Patient ok to defer pap today

## 2019-02-19 NOTE — ASSESSMENT & PLAN NOTE
Continue FemHRT daily  Discussed risks associated with hormone therapy, specifically for breast cancer risk and thromoembolic risks and the goal is to use the lowest effective dose for the shortest duration of time  Patient states her QOL and symptoms are significantly improved on the hormone therapy and would like to continue despite risks  We reviewed trial of every other day if desires  Will re-evaluate therapy each year

## 2019-02-21 DIAGNOSIS — Z12.31 ENCOUNTER FOR SCREENING MAMMOGRAM FOR MALIGNANT NEOPLASM OF BREAST: ICD-10-CM

## 2019-02-21 RX ORDER — ALPRAZOLAM 0.5 MG/1
TABLET ORAL
Qty: 30 TABLET | Refills: 0 | Status: SHIPPED | OUTPATIENT
Start: 2019-02-21 | End: 2019-04-10 | Stop reason: SDUPTHER

## 2019-04-10 DIAGNOSIS — F41.9 ANXIETY: ICD-10-CM

## 2019-04-10 RX ORDER — ALPRAZOLAM 0.5 MG/1
0.5 TABLET ORAL
Qty: 30 TABLET | Refills: 0 | Status: SHIPPED | OUTPATIENT
Start: 2019-04-10 | End: 2019-05-13 | Stop reason: SDUPTHER

## 2019-05-13 DIAGNOSIS — F41.9 ANXIETY: ICD-10-CM

## 2019-05-13 RX ORDER — ALPRAZOLAM 0.5 MG/1
0.5 TABLET ORAL
Qty: 30 TABLET | Refills: 0 | Status: SHIPPED | OUTPATIENT
Start: 2019-05-13 | End: 2019-06-17 | Stop reason: SDUPTHER

## 2019-06-17 DIAGNOSIS — F41.9 ANXIETY: ICD-10-CM

## 2019-06-17 RX ORDER — ALPRAZOLAM 0.5 MG/1
0.5 TABLET ORAL
Qty: 30 TABLET | Refills: 0 | Status: SHIPPED | OUTPATIENT
Start: 2019-06-17 | End: 2019-07-19 | Stop reason: SDUPTHER

## 2019-07-05 DIAGNOSIS — F32.A DEPRESSION, UNSPECIFIED DEPRESSION TYPE: ICD-10-CM

## 2019-07-05 RX ORDER — CITALOPRAM 40 MG/1
40 TABLET ORAL DAILY
Qty: 90 TABLET | Refills: 3 | Status: SHIPPED | OUTPATIENT
Start: 2019-07-05 | End: 2020-06-18 | Stop reason: SDUPTHER

## 2019-07-17 DIAGNOSIS — K21.9 GASTROESOPHAGEAL REFLUX DISEASE WITHOUT ESOPHAGITIS: ICD-10-CM

## 2019-07-17 RX ORDER — PANTOPRAZOLE SODIUM 40 MG/1
TABLET, DELAYED RELEASE ORAL
Qty: 60 TABLET | Refills: 3 | Status: SHIPPED | OUTPATIENT
Start: 2019-07-17 | End: 2020-01-23 | Stop reason: SDUPTHER

## 2019-07-19 DIAGNOSIS — F41.9 ANXIETY: ICD-10-CM

## 2019-07-19 RX ORDER — ALPRAZOLAM 0.5 MG/1
0.5 TABLET ORAL
Qty: 30 TABLET | Refills: 0 | Status: SHIPPED | OUTPATIENT
Start: 2019-07-19 | End: 2019-08-23 | Stop reason: SDUPTHER

## 2019-08-07 ENCOUNTER — OFFICE VISIT (OUTPATIENT)
Dept: FAMILY MEDICINE CLINIC | Facility: CLINIC | Age: 54
End: 2019-08-07
Payer: COMMERCIAL

## 2019-08-07 ENCOUNTER — APPOINTMENT (OUTPATIENT)
Dept: LAB | Facility: CLINIC | Age: 54
End: 2019-08-07
Payer: COMMERCIAL

## 2019-08-07 ENCOUNTER — APPOINTMENT (OUTPATIENT)
Dept: RADIOLOGY | Facility: MEDICAL CENTER | Age: 54
End: 2019-08-07
Payer: COMMERCIAL

## 2019-08-07 VITALS
SYSTOLIC BLOOD PRESSURE: 120 MMHG | WEIGHT: 138 LBS | DIASTOLIC BLOOD PRESSURE: 78 MMHG | HEART RATE: 80 BPM | BODY MASS INDEX: 25.4 KG/M2 | HEIGHT: 62 IN | TEMPERATURE: 99.1 F

## 2019-08-07 DIAGNOSIS — M79.672 FOOT PAIN, BILATERAL: ICD-10-CM

## 2019-08-07 DIAGNOSIS — M79.642 PAIN IN BOTH HANDS: ICD-10-CM

## 2019-08-07 DIAGNOSIS — M25.50 ARTHRALGIA, UNSPECIFIED JOINT: ICD-10-CM

## 2019-08-07 DIAGNOSIS — M79.641 PAIN IN BOTH HANDS: ICD-10-CM

## 2019-08-07 DIAGNOSIS — M25.50 ARTHRALGIA, UNSPECIFIED JOINT: Primary | ICD-10-CM

## 2019-08-07 DIAGNOSIS — M79.671 FOOT PAIN, BILATERAL: ICD-10-CM

## 2019-08-07 DIAGNOSIS — H66.90 ACUTE OTITIS MEDIA, UNSPECIFIED OTITIS MEDIA TYPE: ICD-10-CM

## 2019-08-07 LAB
ALBUMIN SERPL BCP-MCNC: 4 G/DL (ref 3.5–5)
ALP SERPL-CCNC: 62 U/L (ref 46–116)
ALT SERPL W P-5'-P-CCNC: 21 U/L (ref 12–78)
ANION GAP SERPL CALCULATED.3IONS-SCNC: 6 MMOL/L (ref 4–13)
AST SERPL W P-5'-P-CCNC: 19 U/L (ref 5–45)
BILIRUB SERPL-MCNC: 0.81 MG/DL (ref 0.2–1)
BUN SERPL-MCNC: 16 MG/DL (ref 5–25)
CALCIUM SERPL-MCNC: 8.8 MG/DL (ref 8.3–10.1)
CHLORIDE SERPL-SCNC: 104 MMOL/L (ref 100–108)
CO2 SERPL-SCNC: 26 MMOL/L (ref 21–32)
CREAT SERPL-MCNC: 0.89 MG/DL (ref 0.6–1.3)
CRP SERPL QL: 3.4 MG/L
ERYTHROCYTE [DISTWIDTH] IN BLOOD BY AUTOMATED COUNT: 11.9 % (ref 11.6–15.1)
ERYTHROCYTE [SEDIMENTATION RATE] IN BLOOD: 5 MM/HOUR (ref 0–20)
GFR SERPL CREATININE-BSD FRML MDRD: 75 ML/MIN/1.73SQ M
GLUCOSE P FAST SERPL-MCNC: 95 MG/DL (ref 65–99)
HCT VFR BLD AUTO: 43.2 % (ref 34.8–46.1)
HGB BLD-MCNC: 13.9 G/DL (ref 11.5–15.4)
MCH RBC QN AUTO: 31.8 PG (ref 26.8–34.3)
MCHC RBC AUTO-ENTMCNC: 32.2 G/DL (ref 31.4–37.4)
MCV RBC AUTO: 99 FL (ref 82–98)
PLATELET # BLD AUTO: 275 THOUSANDS/UL (ref 149–390)
PMV BLD AUTO: 9.9 FL (ref 8.9–12.7)
POTASSIUM SERPL-SCNC: 3.9 MMOL/L (ref 3.5–5.3)
PROT SERPL-MCNC: 7.6 G/DL (ref 6.4–8.2)
RBC # BLD AUTO: 4.37 MILLION/UL (ref 3.81–5.12)
SODIUM SERPL-SCNC: 136 MMOL/L (ref 136–145)
WBC # BLD AUTO: 6.33 THOUSAND/UL (ref 4.31–10.16)

## 2019-08-07 PROCEDURE — 86430 RHEUMATOID FACTOR TEST QUAL: CPT

## 2019-08-07 PROCEDURE — 36415 COLL VENOUS BLD VENIPUNCTURE: CPT

## 2019-08-07 PROCEDURE — 85652 RBC SED RATE AUTOMATED: CPT

## 2019-08-07 PROCEDURE — 85027 COMPLETE CBC AUTOMATED: CPT

## 2019-08-07 PROCEDURE — 86140 C-REACTIVE PROTEIN: CPT

## 2019-08-07 PROCEDURE — 80053 COMPREHEN METABOLIC PANEL: CPT

## 2019-08-07 PROCEDURE — 73130 X-RAY EXAM OF HAND: CPT

## 2019-08-07 PROCEDURE — 99214 OFFICE O/P EST MOD 30 MIN: CPT | Performed by: FAMILY MEDICINE

## 2019-08-07 PROCEDURE — 1036F TOBACCO NON-USER: CPT | Performed by: FAMILY MEDICINE

## 2019-08-07 PROCEDURE — 73630 X-RAY EXAM OF FOOT: CPT

## 2019-08-07 PROCEDURE — 96372 THER/PROPH/DIAG INJ SC/IM: CPT | Performed by: FAMILY MEDICINE

## 2019-08-07 PROCEDURE — 86235 NUCLEAR ANTIGEN ANTIBODY: CPT

## 2019-08-07 PROCEDURE — 86038 ANTINUCLEAR ANTIBODIES: CPT

## 2019-08-07 PROCEDURE — 86618 LYME DISEASE ANTIBODY: CPT

## 2019-08-07 PROCEDURE — 3008F BODY MASS INDEX DOCD: CPT | Performed by: FAMILY MEDICINE

## 2019-08-07 RX ORDER — PREDNISONE 10 MG/1
TABLET ORAL
Qty: 26 TABLET | Refills: 0 | Status: SHIPPED | OUTPATIENT
Start: 2019-08-07 | End: 2019-12-26 | Stop reason: ALTCHOICE

## 2019-08-07 RX ORDER — METHYLPREDNISOLONE ACETATE 80 MG/ML
80 INJECTION, SUSPENSION INTRA-ARTICULAR; INTRALESIONAL; INTRAMUSCULAR; SOFT TISSUE ONCE
Status: COMPLETED | OUTPATIENT
Start: 2019-08-07 | End: 2019-08-07

## 2019-08-07 RX ORDER — KETOROLAC TROMETHAMINE 30 MG/ML
60 INJECTION, SOLUTION INTRAMUSCULAR; INTRAVENOUS ONCE
Status: COMPLETED | OUTPATIENT
Start: 2019-08-07 | End: 2019-08-07

## 2019-08-07 RX ORDER — CELECOXIB 200 MG/1
200 CAPSULE ORAL DAILY
Qty: 30 CAPSULE | Refills: 3 | Status: SHIPPED | OUTPATIENT
Start: 2019-08-07 | End: 2019-12-26 | Stop reason: ALTCHOICE

## 2019-08-07 RX ORDER — CEFUROXIME AXETIL 500 MG/1
500 TABLET ORAL EVERY 12 HOURS SCHEDULED
Qty: 20 TABLET | Refills: 0 | Status: SHIPPED | OUTPATIENT
Start: 2019-08-07 | End: 2019-08-17

## 2019-08-07 RX ADMIN — METHYLPREDNISOLONE ACETATE 80 MG: 80 INJECTION, SUSPENSION INTRA-ARTICULAR; INTRALESIONAL; INTRAMUSCULAR; SOFT TISSUE at 11:06

## 2019-08-07 RX ADMIN — KETOROLAC TROMETHAMINE 60 MG: 30 INJECTION, SOLUTION INTRAMUSCULAR; INTRAVENOUS at 11:06

## 2019-08-07 NOTE — PROGRESS NOTES
BMI Counseling: Body mass index is 25 24 kg/m²  Discussed the patient's BMI with her  The BMI is above average  BMI counseling and education was provided to the patient  Exercise recommendations include exercising 3-5 times per week  Patient ID: Kathy Delcid is a 48 y o  female  HPI: 48 y  o female presenting with diffuse arthralgia  She complains of hand and foot pain, stiffness and impaired rom of hands especially  She takes Aleve and Ibuprofen daily without relief  She admits to swelling and stiffness in joints regularly  She tried and failed meloxicam in the past   Her father has Scleroderma        SUBJECTIVE    Family History   Problem Relation Age of Onset    Breast cancer Mother         dx in 57's    Other Mother         stenosis    Scleroderma Father     Breast cancer Maternal Grandmother         dx in [de-identified]    Breast cancer Family     Other Family         back disorder     Social History     Socioeconomic History    Marital status: /Civil Union     Spouse name: Not on file    Number of children: Not on file    Years of education: Not on file    Highest education level: Not on file   Occupational History    Not on file   Social Needs    Financial resource strain: Not on file    Food insecurity:     Worry: Not on file     Inability: Not on file    Transportation needs:     Medical: Not on file     Non-medical: Not on file   Tobacco Use    Smoking status: Never Smoker    Smokeless tobacco: Never Used   Substance and Sexual Activity    Alcohol use: Yes     Comment: daily    Drug use: No    Sexual activity: Yes     Partners: Male   Lifestyle    Physical activity:     Days per week: Not on file     Minutes per session: Not on file    Stress: Not on file   Relationships    Social connections:     Talks on phone: Not on file     Gets together: Not on file     Attends Mandaeism service: Not on file     Active member of club or organization: Not on file     Attends meetings of clubs or organizations: Not on file     Relationship status: Not on file    Intimate partner violence:     Fear of current or ex partner: Not on file     Emotionally abused: Not on file     Physically abused: Not on file     Forced sexual activity: Not on file   Other Topics Concern    Not on file   Social History Narrative    Drinks coffee daily     Drinks cola daily    Drinks tea daily     Past Medical History:   Diagnosis Date    Anxiety     Arthritis     Asthma     GERD (gastroesophageal reflux disease)     Irritable bowel syndrome     PONV (postoperative nausea and vomiting)     Skin cancer      Past Surgical History:   Procedure Laterality Date    BREAST SURGERY Bilateral     cyst removal    COLONOSCOPY      DE ESOPHAGOGASTRODUODENOSCOPY TRANSORAL DIAGNOSTIC N/A 1/2/2018    Procedure: EGD AND COLONOSCOPY;  Surgeon: Lorie Welch MD;  [facility];   Service: Gastroenterology    TONSILLECTOMY      WISDOM TOOTH EXTRACTION       No Known Allergies    Current Outpatient Medications:     ALPRAZolam (XANAX) 0 5 mg tablet, Take 1 tablet (0 5 mg total) by mouth daily at bedtime as needed for anxiety, Disp: 30 tablet, Rfl: 0    cetirizine (ZYRTEC ALLERGY) 10 mg tablet, Take by mouth, Disp: , Rfl:     Cholecalciferol (VITAMIN D3) 2000 units TABS, Take 1 tablet by mouth daily, Disp: , Rfl:     citalopram (CeleXA) 40 mg tablet, Take 1 tablet (40 mg total) by mouth daily, Disp: 90 tablet, Rfl: 3    clobetasol (TEMOVATE) 0 05 % ointment, Apply topically 2 (two) times a day, Disp: , Rfl:     Naproxen Sodium (ALEVE) 220 MG CAPS, Take by mouth, Disp: , Rfl:     norethindrone-ethinyl estradiol (FEMHRT LOW DOSE) 0 5-2 5 MG-MCG per tablet, Take 1 tablet by mouth daily As directed, Disp: 28 tablet, Rfl: 11    pantoprazole (PROTONIX) 40 mg tablet, 1 po bid, Disp: 60 tablet, Rfl: 3    cefuroxime (CEFTIN) 500 mg tablet, Take 1 tablet (500 mg total) by mouth every 12 (twelve) hours for 10 days, Disp: 20 tablet, Rfl: 0    celecoxib (CeleBREX) 200 mg capsule, Take 1 capsule (200 mg total) by mouth daily for 30 days, Disp: 30 capsule, Rfl: 3    predniSONE 10 mg tablet, 3 tabs po bid x2 days, then 2 tabs po bid x2 days, then 1 tab bid x2 days, then 1 daily until done , Disp: 26 tablet, Rfl: 0  No current facility-administered medications for this visit  Review of Systems  Constitutional:     Denies fever, chills ,fatigue ,weakness ,weight loss, weight gain     ENT: Denies earache ,loss of hearing ,nosebleed, nasal discharge,nasal congestion ,sore throat ,hoarseness  Pulmonary: Denies shortness of breath ,cough  ,dyspnea on exertion, orthopnea  ,PND   Cardiovascular:  Denies bradycardia , tachycardia  ,palpations, lower extremity edema leg, claudication  Breast:  Denies new or changing breast lumps ,nipple discharge ,nipple changes  Abdomen:  Denies abdominal pain , anorexia , indigestion, nausea, vomiting, constipation, diarrhea  Musculoskeletal: Denies myalgias,  joint swelling, limb pain, limb swelling+ diffuse arthralgia with impaired rom of hands bilaterally, and joint pain and stiffness  Gu: denies dysuria, polyuria  Skin: Denies skin rash, skin lesion, skin wound, itching, dry skin  Neuro: Denies headache, numbness, tingling, confusion, loss of consciousness, dizziness, vertigo  Psychiatric: Denies feelings of depression, suicidal ideation, anxiety, sleep disturbances    OBJECTIVE  /78   Pulse 80   Temp 99 1 °F (37 3 °C)   Ht 5' 2" (1 575 m)   Wt 62 6 kg (138 lb)   BMI 25 24 kg/m²   Constitutional:   NAD, well appearing and well nourished      ENT:   Conjunctiva and lids: no injection, edema, or discharge     Pupils and iris: NAVDEEP bilaterally    External inspection of ears and nose: normal without deformities or discharge  Otoscopic exam: Canals patent without erythema         Nasal mucosa, septum and turbinates: Normal or edema or discharge         Oropharynx:  Moist mucosa, normal tongue and tonsils without lesions  No erythema        Pulmonary:Respiratory effort normal rate and rhythm, no increased work of breathing  Auscultation of lungs:  Clear bilaterally with no adventitious breath sounds       Cardiovascular: regular rate and rhythm, S1 and S2, no murmur, no edema and/or varicosities of LE      Abdomen: Soft and non-distended     Positive bowel sounds      No heptomegaly or splenomegaly      Gu: no suprapubic tenderness or CVA tenderness, no urethral discharge  Lymphatic:  No anterior or posterior cervical lymphadenopathy         Musculoskeletal:  Gait and station: Normal gait      Digits and nails normal without clubbing or cyanosis       Inspection/palpation of joints, bones, and muscles:  + nodules distally on fingers of both hands bilaterally;  full active and passive range of motion of hands bilaterally  ; No swelling or warmth of joints on palpation       Skin: Normal skin turgor and no rashes      Neuro:    Normal reflexes      Psych:   alert and oriented to person, place and time     normal mood and affect       Assessment/Plan:Diagnoses and all orders for this visit:    Arthralgia, unspecified joint  -     Sedimentation rate, automated; Future  -     C-reactive protein; Future  -     RF Screen w/ Reflex to Titer; Future  -     Sjogren's Antibodies; Future  -     GILL Screen w/ Reflex to Titer/Pattern; Future  -     Lyme Ab/Western Blot Reflex; Future  -     Comprehensive metabolic panel; Future  -     CBC and Platelet; Future  -     predniSONE 10 mg tablet; 3 tabs po bid x2 days, then 2 tabs po bid x2 days, then 1 tab bid x2 days, then 1 daily until done  -     celecoxib (CeleBREX) 200 mg capsule; Take 1 capsule (200 mg total) by mouth daily for 30 days  -     ketorolac (TORADOL) 60 mg/2 mL IM injection 60 mg  -     methylPREDNISolone acetate (DEPO-MEDROL) injection 80 mg    Foot pain, bilateral  -     XR foot 3+ vw left;  Future  -     XR foot 3+ vw right; Future    Pain in both hands  -     XR hand 3+ vw left; Future  -     XR hand 3+ vw right; Future    Acute otitis media, unspecified otitis media type  -     cefuroxime (CEFTIN) 500 mg tablet; Take 1 tablet (500 mg total) by mouth every 12 (twelve) hours for 10 days        Reviewed with patient plan to treat with above plan      Patient instructed to call in 72 hours if not feeling better or if symptoms worsen

## 2019-08-08 LAB
B BURGDOR IGG SER IA-ACNC: 0.6
B BURGDOR IGM SER IA-ACNC: 0.2
ENA SS-A AB SER-ACNC: <0.2 AI (ref 0–0.9)
ENA SS-B AB SER-ACNC: <0.2 AI (ref 0–0.9)
RHEUMATOID FACT SER QL LA: NEGATIVE

## 2019-08-09 DIAGNOSIS — M25.50 ARTHRALGIA, UNSPECIFIED JOINT: Primary | ICD-10-CM

## 2019-08-09 LAB — RYE IGE QN: NEGATIVE

## 2019-08-19 ENCOUNTER — TELEPHONE (OUTPATIENT)
Dept: FAMILY MEDICINE CLINIC | Facility: CLINIC | Age: 54
End: 2019-08-19

## 2019-08-19 NOTE — TELEPHONE ENCOUNTER
[employer], can you do a prior auth? She has rheumatoid arthritis and celebrex has an indication for this

## 2019-08-19 NOTE — TELEPHONE ENCOUNTER
Patient called stating Duke Aguirre called patient stating she needs a prior authorization for her Celbrex 200mg

## 2019-08-23 DIAGNOSIS — F41.9 ANXIETY: ICD-10-CM

## 2019-08-23 RX ORDER — ALPRAZOLAM 0.5 MG/1
0.5 TABLET ORAL
Qty: 30 TABLET | Refills: 0 | Status: SHIPPED | OUTPATIENT
Start: 2019-08-23 | End: 2019-09-20 | Stop reason: SDUPTHER

## 2019-09-20 DIAGNOSIS — F41.9 ANXIETY: ICD-10-CM

## 2019-09-20 RX ORDER — ALPRAZOLAM 0.5 MG/1
0.5 TABLET ORAL
Qty: 30 TABLET | Refills: 0 | Status: SHIPPED | OUTPATIENT
Start: 2019-09-20 | End: 2019-10-22 | Stop reason: SDUPTHER

## 2019-10-22 DIAGNOSIS — F41.9 ANXIETY: ICD-10-CM

## 2019-10-22 RX ORDER — ALPRAZOLAM 0.5 MG/1
0.5 TABLET ORAL
Qty: 30 TABLET | Refills: 0 | Status: SHIPPED | OUTPATIENT
Start: 2019-10-22 | End: 2019-11-22 | Stop reason: SDUPTHER

## 2019-11-22 DIAGNOSIS — F41.9 ANXIETY: ICD-10-CM

## 2019-11-22 RX ORDER — ALPRAZOLAM 0.5 MG/1
0.5 TABLET ORAL
Qty: 30 TABLET | Refills: 0 | Status: SHIPPED | OUTPATIENT
Start: 2019-11-22 | End: 2019-12-23 | Stop reason: SDUPTHER

## 2019-12-23 DIAGNOSIS — F41.9 ANXIETY: ICD-10-CM

## 2019-12-23 RX ORDER — ALPRAZOLAM 0.5 MG/1
0.5 TABLET ORAL
Qty: 30 TABLET | Refills: 0 | Status: SHIPPED | OUTPATIENT
Start: 2019-12-23 | End: 2020-01-27

## 2019-12-26 ENCOUNTER — OFFICE VISIT (OUTPATIENT)
Dept: FAMILY MEDICINE CLINIC | Facility: CLINIC | Age: 54
End: 2019-12-26
Payer: COMMERCIAL

## 2019-12-26 VITALS
BODY MASS INDEX: 25.05 KG/M2 | TEMPERATURE: 98.1 F | SYSTOLIC BLOOD PRESSURE: 122 MMHG | DIASTOLIC BLOOD PRESSURE: 80 MMHG | WEIGHT: 136.13 LBS | HEIGHT: 62 IN | HEART RATE: 74 BPM

## 2019-12-26 DIAGNOSIS — S29.011A MUSCLE STRAIN OF CHEST WALL, INITIAL ENCOUNTER: Primary | ICD-10-CM

## 2019-12-26 PROCEDURE — 99213 OFFICE O/P EST LOW 20 MIN: CPT | Performed by: FAMILY MEDICINE

## 2019-12-26 PROCEDURE — 1036F TOBACCO NON-USER: CPT | Performed by: FAMILY MEDICINE

## 2019-12-26 PROCEDURE — 3008F BODY MASS INDEX DOCD: CPT | Performed by: FAMILY MEDICINE

## 2019-12-26 NOTE — PROGRESS NOTES
Aneta Lisa 1965 female MRN: [id number]    Acute Visit    Assessment/Plan     Brisa Coppola was seen today for pain  Diagnoses and all orders for this visit:    Muscle strain of chest wall, initial encounter    supportive care  Return if not improving in 1 week     Ha Pope MD  [address]  12/26/2019      Please be aware that this note contains text that was dictated and there may be errors pertaining to "sound-alike "words during the dictation process  SUBJECTIVE    CC: Pain (under right breast area )    HPI:  Aneta Lisa is a 48 y o  female who presented for an acute visit complaining of 1 week pain in right chest area under breast  She was lifting light 5-8 lb hand weights  Didn't feel any injury or pain but the next day started with discomfort  Comes and goes  Worse with movement and deep breaths  Tried Tums and other GI meds without improvement  Hurts with bra (underwire)  No bruise/deformity  Review of Systems   Respiratory: Negative for cough, chest tightness and shortness of breath  Cardiovascular: Negative for chest pain, palpitations and leg swelling  Gastrointestinal: Positive for abdominal pain  Negative for diarrhea, nausea and vomiting  Musculoskeletal: Positive for arthralgias  All other systems reviewed and are negative      Medications:   Meds/Allergies   Current Outpatient Medications   Medication Sig Dispense Refill    ALPRAZolam (XANAX) 0 5 mg tablet Take 1 tablet (0 5 mg total) by mouth daily at bedtime as needed for anxiety 30 tablet 0    cetirizine (ZYRTEC ALLERGY) 10 mg tablet Take by mouth      Cholecalciferol (VITAMIN D3) 2000 units TABS Take 1 tablet by mouth daily      citalopram (CeleXA) 40 mg tablet Take 1 tablet (40 mg total) by mouth daily 90 tablet 3    Naproxen Sodium (ALEVE) 220 MG CAPS Take by mouth      norethindrone-ethinyl estradiol (FEMHRT LOW DOSE) 0 5-2 5 MG-MCG per tablet Take 1 tablet by mouth daily As directed 28 tablet 11    pantoprazole (PROTONIX) 40 mg tablet 1 po bid 60 tablet 3     No current facility-administered medications for this visit  No Known Allergies    OBJECTIVE    Vitals:   Vitals:    12/26/19 1137   BP: 122/80   Pulse: 74   Temp: 98 1 °F (36 7 °C)   Weight: 61 7 kg (136 lb 2 oz)   Height: 5' 2" (1 575 m)       Physical Exam   Constitutional: Vital signs are normal  She appears well-developed and well-nourished  She does not appear ill  No distress  HENT:   Head: Normocephalic and atraumatic  Right Ear: External ear normal    Left Ear: External ear normal    Nose: Nose normal    Eyes: Conjunctivae, EOM and lids are normal    Neck: No JVD present  No tracheal deviation present  Cardiovascular: Intact distal pulses  Pulmonary/Chest: No accessory muscle usage  No respiratory distress  Abdominal: Normal appearance and bowel sounds are normal  There is no tenderness  There is no rigidity, no rebound, no guarding and negative Neal's sign  Neurological: She is alert  Skin: No rash noted  She is not diaphoretic  Psychiatric: She has a normal mood and affect  Her speech is normal and behavior is normal  She expresses no suicidal ideation  Nursing note and vitals reviewed

## 2019-12-26 NOTE — PATIENT INSTRUCTIONS
Muscle Strain   AMBULATORY CARE:   A muscle strain  is a twist, pull, or tear of a muscle or tendon  A tendon is a strong elastic tissue that connects a muscle to a bone  Common symptoms include the following:   · Bruised skin on the area of your injured muscle    · Muscle soreness, cramps, or spasms    · Little or stiff muscle movement, or loss of muscle strength    · Swelling in the area of the injury    · Muscle pain that gets worse with activity, or pain that moves or spreads to another body area    · Crepitus (crackling sound or grating feeling) when you move your muscle  Seek immediate care for the following symptoms:   · Sudden loss of feeling or movement in your injured muscle    Contact your healthcare provider if:   · Your pain and swelling worsen or do not go away  · You have questions or concerns about your condition or care  Treatment for a muscle strain  may include any of the following:  · NSAIDs , such as ibuprofen, help decrease swelling, pain, and fever  This medicine is available with or without a doctor's order  NSAIDs can cause stomach bleeding or kidney problems in certain people  If you take blood thinner medicine, always ask your healthcare provider if NSAIDs are safe for you  Always read the medicine label and follow directions  · Muscle relaxers  help decrease pain and muscle spasms, and relax your muscles  · Steroid medicine  may be given to decrease pain and inflammation  · Local anesthetic  is medicine used to numb the area for a short time  This is often used if you have a muscle strain in your back  · Physical therapy exercises  may help improve movement and decrease pain  Physical therapy can also help improve strength and decrease your risk for loss of function  · Surgery  may be needed if your muscle strain does not heal after 6 months of treatment  Surgery may be done to drain blood that has pooled in your muscle   If your tendon was torn off of the bone, it may be put back with surgery  Manage your symptoms:   · Rest your muscle  to allow your injury to heal  When the pain decreases, begin normal, slow movements  For mild and moderate muscle strains, rest your muscles for about 2 days  Rest for 10 to 14 days if you have a severe muscle strain  You may need to use crutches if your muscle strain is in your legs  · Apply ice on your injured area  for 15 to 20 minutes every hour or as directed  Use an ice pack, or put crushed ice in a plastic bag  Cover it with a towel  Ice helps prevent tissue damage and decreases swelling and pain  · Use an elastic bandage as directed  Wrap the bandage around the area to decrease swelling  It should be snug, but not too tight  · Elevate your injured muscle  above the level of your heart as often as you can  This will help decrease swelling and pain  Prop your injured muscle on pillows or blankets to keep it elevated comfortably  · Stretch and strengthen your muscles each day  Stretch for about 30 seconds, 4 times a day  Stretch the muscle until you feel a slight pull  Stop stretching if you feel pain  Start to exercise and strengthen your muscles slowly  Increase the time and amount you exercise  Prevent another muscle strain:   · Always wear proper shoes when you play sports  Replace your old running shoes with new ones often if you are a runner  Use shoe inserts or arch supports to correct leg or foot problems  Ask your healthcare provider for more information on shoe supports  · Do warm up and cool down exercises  Do stretching exercises before you work out or do sports activities  These exercises will help loosen and decrease stress on your muscles  Cool down and stretch after your workout  Do not stop and rest after a workout without cooling down first            · Keep your muscles strong with strength training exercises    Exercises such as weight lifting and stretching exercises help keep your muscles flexible and strong  A physical therapist or  may help you with these exercises  · Slowly start your exercise or sports training program   Follow your healthcare provider's directions on when to start exercising  Slowly increase time, distance, and how often you train  Sudden increases in how often you train may cause you to injure your muscle again  Follow up with your healthcare provider as directed:  Write down your questions so you remember to ask them during your visits  © 2017 [facility] Information is for End User's use only and may not be sold, redistributed or otherwise used for commercial purposes  All illustrations and images included in CareNotes® are the copyrighted property of A D A M , Inc  or Zane Mccann  The above information is an  only  It is not intended as medical advice for individual conditions or treatments  Talk to your doctor, nurse or pharmacist before following any medical regimen to see if it is safe and effective for you

## 2020-01-23 DIAGNOSIS — K21.9 GASTROESOPHAGEAL REFLUX DISEASE WITHOUT ESOPHAGITIS: ICD-10-CM

## 2020-01-23 RX ORDER — PANTOPRAZOLE SODIUM 40 MG/1
TABLET, DELAYED RELEASE ORAL
Qty: 60 TABLET | Refills: 3 | Status: SHIPPED | OUTPATIENT
Start: 2020-01-23 | End: 2020-09-08 | Stop reason: SDUPTHER

## 2020-01-26 DIAGNOSIS — F41.9 ANXIETY: ICD-10-CM

## 2020-01-27 RX ORDER — ALPRAZOLAM 0.5 MG/1
TABLET ORAL
Qty: 30 TABLET | Refills: 0 | Status: SHIPPED | OUTPATIENT
Start: 2020-01-27 | End: 2020-03-02 | Stop reason: SDUPTHER

## 2020-02-25 ENCOUNTER — ANNUAL EXAM (OUTPATIENT)
Dept: OBGYN CLINIC | Facility: CLINIC | Age: 55
End: 2020-02-25
Payer: COMMERCIAL

## 2020-02-25 VITALS
WEIGHT: 135 LBS | BODY MASS INDEX: 24.84 KG/M2 | DIASTOLIC BLOOD PRESSURE: 78 MMHG | HEIGHT: 62 IN | SYSTOLIC BLOOD PRESSURE: 120 MMHG

## 2020-02-25 DIAGNOSIS — Z01.419 ROUTINE GYNECOLOGICAL EXAMINATION: Primary | ICD-10-CM

## 2020-02-25 DIAGNOSIS — Z12.31 ENCOUNTER FOR SCREENING MAMMOGRAM FOR MALIGNANT NEOPLASM OF BREAST: ICD-10-CM

## 2020-02-25 DIAGNOSIS — N95.1 PERIMENOPAUSAL VASOMOTOR SYMPTOMS: ICD-10-CM

## 2020-02-25 PROCEDURE — 99396 PREV VISIT EST AGE 40-64: CPT | Performed by: PHYSICIAN ASSISTANT

## 2020-02-25 RX ORDER — NORETHINDRONE ACETATE AND ETHINYL ESTRADIOL .5; 2.5 MG/1; UG/1
1 TABLET ORAL DAILY
Qty: 90 TABLET | Refills: 1 | Status: SHIPPED | OUTPATIENT
Start: 2020-02-25 | End: 2020-10-27 | Stop reason: ALTCHOICE

## 2020-02-25 NOTE — PROGRESS NOTES
Assessment/Plan   Problem List Items Addressed This Visit        Other    Perimenopausal vasomotor symptoms    Relevant Medications    norethindrone-ethinyl estradiol (FEMHRT LOW DOSE) 0 5-2 5 MG-MCG per tablet    Routine gynecological examination - Primary     Pap guidelines reviewed  Pap with HPV done today  Recommend continuing to decrease FemHrt until discontinued  Reviewed with patient now considered menopausal with going 1 year no menses  If has any bleeding after this point needs to be seen for postmenopausal bleeding work up  Return to office for annual or as needed  Relevant Orders    Pap Lb, HPV-hr      Other Visit Diagnoses     Encounter for screening mammogram for malignant neoplasm of breast        Relevant Orders    Mammo screening bilateral w 3d & cad          Subjective:     Patient ID: Dion Tim is a 48 y o  y o  female  HPI  47 yo seen for annual exam  LMP: 2/2019 no bleeding since  Currently on [facility] for hot flashes  Decreased to every other day last year doing well  Denies bowel or bladder issues  Last pap: 4/12/2018 NILM 2/14/2018 unsatisfactory (-)HRHPV  Last mammogram: 2/21/2019 BIRADS 1: Negative  The following portions of the patient's history were reviewed and updated as appropriate:   She  has a past medical history of Anxiety, Arthritis, Asthma, GERD (gastroesophageal reflux disease), Irritable bowel syndrome, PONV (postoperative nausea and vomiting), and Skin cancer    She   Patient Active Problem List    Diagnosis Date Noted    Routine gynecological examination 02/25/2020    Encounter for gynecological examination (general) (routine) without abnormal findings 02/19/2019    Perimenopausal vasomotor symptoms 02/19/2019    Biliary dyskinesia 11/21/2017    Bloating 11/15/2017    Other fatigue 09/06/2017    Perineal irritation 09/06/2017    Vaginal dryness 09/06/2017    Weight gain 09/06/2017    Fever 02/23/2016    Influenza 02/23/2016    Allergic rhinitis 2015    Neck pain 2015    Myofascial pain syndrome 2015    Bulge of cervical disc without myelopathy 2015    Herniated cervical disc 2015    Cervical paraspinal muscle spasm 2015    Reactive airway disease 2014    Seasonal allergies 2014    Tracheitis 2014    Thyroiditis 10/07/2013    Pruritus 10/01/2013    Esophageal reflux 2013    Anxiety disorder 10/02/2012    Lower back pain 10/01/2012     She  has a past surgical history that includes Tonsillectomy; [place] tooth extraction; Breast surgery (Bilateral); Colonoscopy; and pr esophagogastroduodenoscopy transoral diagnostic (N/A, 2018)  Her family history includes Breast cancer in her family, maternal grandmother, and mother; Other in her family and mother; Scleroderma in her father  She  reports that she has never smoked  She has never used smokeless tobacco  She reports that she drinks alcohol  She reports that she does not use drugs  Current Outpatient Medications   Medication Sig Dispense Refill    ALPRAZolam (XANAX) 0 5 mg tablet TAKE 1 TABLET BY MOUTH EVERY DAY AT BEDTIME AS NEEDED FOR ANXIETY 30 tablet 0    cetirizine (ZYRTEC ALLERGY) 10 mg tablet Take by mouth      Cholecalciferol (VITAMIN D3) 2000 units TABS Take 1 tablet by mouth daily      citalopram (CeleXA) 40 mg tablet Take 1 tablet (40 mg total) by mouth daily 90 tablet 3    Naproxen Sodium (ALEVE) 220 MG CAPS Take by mouth      norethindrone-ethinyl estradiol (FEMHRT LOW DOSE) 0 5-2 5 MG-MCG per tablet Take 1 tablet by mouth daily As directed 90 tablet 1    pantoprazole (PROTONIX) 40 mg tablet 1 po bid 60 tablet 3     No current facility-administered medications for this visit  She has No Known Allergies       Menstrual History:  OB History        1    Para   1    Term   1            AB        Living   1       SAB        TAB        Ectopic        Multiple        Live Births   1 Obstetric Comments   : 38  F            Menarche age: 17  No LMP recorded  Patient is postmenopausal          Review of Systems   Constitutional: Negative for fatigue, fever and unexpected weight change  HENT: Negative for dental problem and sinus pressure  Eyes: Negative for visual disturbance  Respiratory: Negative for cough, shortness of breath and wheezing  Cardiovascular: Negative for chest pain  Gastrointestinal: Negative for abdominal pain, blood in stool, constipation, diarrhea, nausea and vomiting  Endocrine: Negative for polydipsia  Genitourinary: Negative for difficulty urinating, dyspareunia, dysuria, frequency, hematuria, pelvic pain and urgency  Musculoskeletal: Negative for arthralgias and back pain  Neurological: Negative for dizziness, seizures, light-headedness and headaches  Psychiatric/Behavioral: Negative for suicidal ideas  The patient is not nervous/anxious  Objective:  Vitals:    20 1053   BP: 120/78   BP Location: Left arm   Patient Position: Sitting   Cuff Size: Standard   Weight: 61 2 kg (135 lb)   Height: 5' 2" (1 575 m)      Physical Exam   Constitutional: She is oriented to person, place, and time  She appears well-developed and well-nourished  Genitourinary: Rectum normal, vagina normal and uterus normal  Pelvic exam was performed with patient supine  There is no rash, tenderness, lesion, injury or Bartholin's cyst on the right labia  There is no rash, tenderness, lesion, injury or Bartholin's cyst on the left labia  Vagina exhibits no lesion  No erythema, tenderness or bleeding in the vagina  No signs of injury around the vagina  No vaginal discharge found  Right adnexum does not display mass, does not display tenderness and does not display fullness  Left adnexum does not display mass, does not display tenderness and does not display fullness  Cervix does not exhibit motion tenderness, lesion or discharge   Uterus is not enlarged, tender, exhibiting a mass, irregular (is regular) or mobile  Rectal exam shows no external hemorrhoid, no internal hemorrhoid, no fissure, no mass, no tenderness, anal tone normal and guaiac negative stool  HENT:   Head: Normocephalic and atraumatic  Neck: No thyromegaly present  Cardiovascular: Normal rate, regular rhythm and normal heart sounds  Exam reveals no gallop and no friction rub  No murmur heard  Pulmonary/Chest: Effort normal and breath sounds normal  No respiratory distress  She has no wheezes  Right breast exhibits no inverted nipple, no mass, no nipple discharge, no skin change and no tenderness  Left breast exhibits no inverted nipple, no mass, no nipple discharge, no skin change and no tenderness  No breast swelling, tenderness, discharge or bleeding  Breasts are symmetrical    Abdominal: Soft  She exhibits no distension and no mass  There is no tenderness  There is no rebound and no guarding  No hernia  Lymphadenopathy:     She has no cervical adenopathy  Right: No inguinal adenopathy present  Left: No inguinal adenopathy present  Neurological: She is alert and oriented to person, place, and time  Skin: Skin is warm and dry  Psychiatric: She has a normal mood and affect   Her behavior is normal

## 2020-02-25 NOTE — ASSESSMENT & PLAN NOTE
Pap guidelines reviewed  Pap with HPV done today  Recommend continuing to decrease FemHrt until discontinued  Reviewed with patient now considered menopausal with going 1 year no menses  If has any bleeding after this point needs to be seen for postmenopausal bleeding work up  Return to office for annual or as needed

## 2020-02-28 LAB
CYTOLOGIST CVX/VAG CYTO: NORMAL
DX ICD CODE: NORMAL
HPV I/H RISK 1 DNA CVX QL PROBE+SIG AMP: NEGATIVE
OTHER STN SPEC: NORMAL
PATH REPORT.FINAL DX SPEC: NORMAL
SL AMB NOTE:: NORMAL
SL AMB SPECIMEN ADEQUACY: NORMAL

## 2020-03-02 DIAGNOSIS — F41.9 ANXIETY: ICD-10-CM

## 2020-03-02 RX ORDER — ALPRAZOLAM 0.5 MG/1
0.5 TABLET ORAL
Qty: 30 TABLET | Refills: 0 | Status: SHIPPED | OUTPATIENT
Start: 2020-03-02 | End: 2020-03-27 | Stop reason: SDUPTHER

## 2020-03-27 DIAGNOSIS — F41.9 ANXIETY: ICD-10-CM

## 2020-03-27 RX ORDER — ALPRAZOLAM 0.5 MG/1
0.5 TABLET ORAL
Qty: 30 TABLET | Refills: 0 | Status: SHIPPED | OUTPATIENT
Start: 2020-03-27 | End: 2020-04-30 | Stop reason: SDUPTHER

## 2020-04-14 ENCOUNTER — HOSPITAL ENCOUNTER (OUTPATIENT)
Dept: RADIOLOGY | Facility: MEDICAL CENTER | Age: 55
Discharge: HOME | End: 2020-04-14

## 2020-04-30 DIAGNOSIS — F41.9 ANXIETY: ICD-10-CM

## 2020-04-30 RX ORDER — ALPRAZOLAM 0.5 MG/1
0.5 TABLET ORAL
Qty: 30 TABLET | Refills: 0 | Status: SHIPPED | OUTPATIENT
Start: 2020-04-30 | End: 2020-05-29 | Stop reason: SDUPTHER

## 2020-05-20 ENCOUNTER — HOSPITAL ENCOUNTER (OUTPATIENT)
Dept: RADIOLOGY | Facility: MEDICAL CENTER | Age: 55
Discharge: HOME | End: 2020-05-20
Payer: COMMERCIAL

## 2020-05-20 VITALS — WEIGHT: 135 LBS | HEIGHT: 62 IN | BODY MASS INDEX: 24.84 KG/M2

## 2020-05-20 DIAGNOSIS — Z12.31 ENCOUNTER FOR SCREENING MAMMOGRAM FOR MALIGNANT NEOPLASM OF BREAST: ICD-10-CM

## 2020-05-20 PROCEDURE — 77067 SCR MAMMO BI INCL CAD: CPT

## 2020-05-20 PROCEDURE — 77063 BREAST TOMOSYNTHESIS BI: CPT

## 2020-05-29 DIAGNOSIS — F41.9 ANXIETY: ICD-10-CM

## 2020-05-29 RX ORDER — ALPRAZOLAM 0.5 MG/1
0.5 TABLET ORAL
Qty: 30 TABLET | Refills: 0 | Status: SHIPPED | OUTPATIENT
Start: 2020-05-29 | End: 2020-07-01 | Stop reason: SDUPTHER

## 2020-06-18 DIAGNOSIS — F32.A DEPRESSION, UNSPECIFIED DEPRESSION TYPE: ICD-10-CM

## 2020-06-18 RX ORDER — CITALOPRAM 40 MG/1
40 TABLET ORAL DAILY
Qty: 90 TABLET | Refills: 3 | Status: SHIPPED | OUTPATIENT
Start: 2020-06-18 | End: 2021-06-14 | Stop reason: SDUPTHER

## 2020-06-23 ENCOUNTER — OFFICE VISIT (OUTPATIENT)
Dept: FAMILY MEDICINE CLINIC | Facility: CLINIC | Age: 55
End: 2020-06-23
Payer: COMMERCIAL

## 2020-06-23 VITALS
HEIGHT: 62 IN | SYSTOLIC BLOOD PRESSURE: 110 MMHG | HEART RATE: 74 BPM | DIASTOLIC BLOOD PRESSURE: 72 MMHG | TEMPERATURE: 99.4 F | WEIGHT: 135 LBS | BODY MASS INDEX: 24.84 KG/M2

## 2020-06-23 DIAGNOSIS — Z00.00 ANNUAL PHYSICAL EXAM: Primary | ICD-10-CM

## 2020-06-23 DIAGNOSIS — R53.83 OTHER FATIGUE: ICD-10-CM

## 2020-06-23 DIAGNOSIS — B35.4 TINEA CORPORIS: ICD-10-CM

## 2020-06-23 DIAGNOSIS — E78.00 HYPERCHOLESTEROLEMIA: ICD-10-CM

## 2020-06-23 PROCEDURE — 3008F BODY MASS INDEX DOCD: CPT | Performed by: FAMILY MEDICINE

## 2020-06-23 PROCEDURE — 99213 OFFICE O/P EST LOW 20 MIN: CPT | Performed by: FAMILY MEDICINE

## 2020-06-23 PROCEDURE — 1036F TOBACCO NON-USER: CPT | Performed by: FAMILY MEDICINE

## 2020-06-23 PROCEDURE — 99396 PREV VISIT EST AGE 40-64: CPT | Performed by: FAMILY MEDICINE

## 2020-06-23 RX ORDER — KETOCONAZOLE 20 MG/G
CREAM TOPICAL 2 TIMES DAILY
Qty: 60 G | Refills: 2 | Status: SHIPPED | OUTPATIENT
Start: 2020-06-23 | End: 2020-10-27 | Stop reason: ALTCHOICE

## 2020-06-26 ENCOUNTER — APPOINTMENT (OUTPATIENT)
Dept: LAB | Facility: CLINIC | Age: 55
End: 2020-06-26
Payer: COMMERCIAL

## 2020-06-26 DIAGNOSIS — E78.00 HYPERCHOLESTEROLEMIA: ICD-10-CM

## 2020-06-26 DIAGNOSIS — R53.83 OTHER FATIGUE: ICD-10-CM

## 2020-06-26 LAB
ALBUMIN SERPL BCP-MCNC: 3.8 G/DL (ref 3.5–5)
ALP SERPL-CCNC: 70 U/L (ref 46–116)
ALT SERPL W P-5'-P-CCNC: 19 U/L (ref 12–78)
ANION GAP SERPL CALCULATED.3IONS-SCNC: 5 MMOL/L (ref 4–13)
AST SERPL W P-5'-P-CCNC: 16 U/L (ref 5–45)
BILIRUB SERPL-MCNC: 0.63 MG/DL (ref 0.2–1)
BUN SERPL-MCNC: 16 MG/DL (ref 5–25)
CALCIUM SERPL-MCNC: 9.5 MG/DL (ref 8.3–10.1)
CHLORIDE SERPL-SCNC: 108 MMOL/L (ref 100–108)
CHOLEST SERPL-MCNC: 243 MG/DL (ref 50–200)
CO2 SERPL-SCNC: 26 MMOL/L (ref 21–32)
CREAT SERPL-MCNC: 0.8 MG/DL (ref 0.6–1.3)
GFR SERPL CREATININE-BSD FRML MDRD: 84 ML/MIN/1.73SQ M
GLUCOSE P FAST SERPL-MCNC: 97 MG/DL (ref 65–99)
HDLC SERPL-MCNC: 63 MG/DL
LDLC SERPL CALC-MCNC: 154 MG/DL (ref 0–100)
POTASSIUM SERPL-SCNC: 4.2 MMOL/L (ref 3.5–5.3)
PROT SERPL-MCNC: 7.3 G/DL (ref 6.4–8.2)
SODIUM SERPL-SCNC: 139 MMOL/L (ref 136–145)
TRIGL SERPL-MCNC: 130 MG/DL

## 2020-06-26 PROCEDURE — 36415 COLL VENOUS BLD VENIPUNCTURE: CPT

## 2020-06-26 PROCEDURE — 80061 LIPID PANEL: CPT

## 2020-06-26 PROCEDURE — 80053 COMPREHEN METABOLIC PANEL: CPT

## 2020-07-01 DIAGNOSIS — F41.9 ANXIETY: ICD-10-CM

## 2020-07-01 RX ORDER — ALPRAZOLAM 0.5 MG/1
0.5 TABLET ORAL
Qty: 30 TABLET | Refills: 0 | Status: SHIPPED | OUTPATIENT
Start: 2020-07-01 | End: 2020-07-30

## 2020-07-01 NOTE — TELEPHONE ENCOUNTER
[place]  Patient follows with Dr J Luis Govea for this controlled substance  Last refills:    05/29/2020  1   05/29/2020  Alprazolam 0 5 MG Tablet  30 00 30 Ca Bro   [id number]   We ([id number])   0          I will refill as ordered

## 2020-07-30 DIAGNOSIS — F41.9 ANXIETY: ICD-10-CM

## 2020-07-30 RX ORDER — ALPRAZOLAM 0.5 MG/1
TABLET ORAL
Qty: 30 TABLET | Refills: 0 | Status: SHIPPED | OUTPATIENT
Start: 2020-07-30 | End: 2020-08-31 | Stop reason: SDUPTHER

## 2020-08-31 DIAGNOSIS — F41.9 ANXIETY: ICD-10-CM

## 2020-08-31 RX ORDER — ALPRAZOLAM 0.5 MG/1
0.5 TABLET ORAL
Qty: 30 TABLET | Refills: 0 | Status: SHIPPED | OUTPATIENT
Start: 2020-08-31 | End: 2020-09-29 | Stop reason: SDUPTHER

## 2020-08-31 NOTE — TELEPHONE ENCOUNTER
Per PDMP last fill 07/30/20  Last OV 06/23/20 07/30/2020 1 07/30/2020 ALPRAZOLAM 0 5 MG TABLET 30 0 30 [facility]O [id number] KTAHY ([id number]) 0 [place]

## 2020-09-08 DIAGNOSIS — K21.9 GASTROESOPHAGEAL REFLUX DISEASE WITHOUT ESOPHAGITIS: ICD-10-CM

## 2020-09-08 RX ORDER — PANTOPRAZOLE SODIUM 40 MG/1
TABLET, DELAYED RELEASE ORAL
Qty: 60 TABLET | Refills: 3 | Status: SHIPPED | OUTPATIENT
Start: 2020-09-08 | End: 2021-06-15 | Stop reason: SDUPTHER

## 2020-09-29 DIAGNOSIS — F41.9 ANXIETY: ICD-10-CM

## 2020-09-29 RX ORDER — ALPRAZOLAM 0.5 MG/1
0.5 TABLET ORAL
Qty: 30 TABLET | Refills: 0 | Status: SHIPPED | OUTPATIENT
Start: 2020-09-29 | End: 2020-11-02 | Stop reason: SDUPTHER

## 2020-09-29 NOTE — TELEPHONE ENCOUNTER
Per PDMP last fill 08/31/20  Last OV 06/23/20 08/31/2020 1 08/31/2020 ALPRAZOLAM 0 5 MG TABLET 30 0 30 [place] [facility]O [id number] KATHY ([id number]) 0 [place]

## 2020-10-27 ENCOUNTER — OFFICE VISIT (OUTPATIENT)
Dept: FAMILY MEDICINE CLINIC | Facility: CLINIC | Age: 55
End: 2020-10-27
Payer: COMMERCIAL

## 2020-10-27 VITALS
WEIGHT: 135 LBS | TEMPERATURE: 98 F | DIASTOLIC BLOOD PRESSURE: 78 MMHG | SYSTOLIC BLOOD PRESSURE: 118 MMHG | HEART RATE: 72 BPM | BODY MASS INDEX: 24.84 KG/M2 | HEIGHT: 62 IN | OXYGEN SATURATION: 98 %

## 2020-10-27 DIAGNOSIS — M54.50 LUMBAR BACK PAIN: ICD-10-CM

## 2020-10-27 DIAGNOSIS — M50.121 CERVICAL DISC DISORDER AT C4-C5 LEVEL WITH RADICULOPATHY: Primary | ICD-10-CM

## 2020-10-27 PROCEDURE — 3725F SCREEN DEPRESSION PERFORMED: CPT | Performed by: FAMILY MEDICINE

## 2020-10-27 PROCEDURE — 99214 OFFICE O/P EST MOD 30 MIN: CPT | Performed by: FAMILY MEDICINE

## 2020-10-27 PROCEDURE — 3008F BODY MASS INDEX DOCD: CPT | Performed by: FAMILY MEDICINE

## 2020-10-27 RX ORDER — CYCLOBENZAPRINE HCL 10 MG
10 TABLET ORAL
Qty: 15 TABLET | Refills: 0 | Status: SHIPPED | OUTPATIENT
Start: 2020-10-27 | End: 2021-07-13 | Stop reason: ALTCHOICE

## 2020-10-27 RX ORDER — PREDNISONE 10 MG/1
TABLET ORAL
Qty: 26 TABLET | Refills: 0 | Status: SHIPPED | OUTPATIENT
Start: 2020-10-27 | End: 2021-03-03

## 2020-10-27 RX ORDER — HYDROCODONE BITARTRATE AND ACETAMINOPHEN 5; 325 MG/1; MG/1
TABLET ORAL
Qty: 20 TABLET | Refills: 0 | Status: SHIPPED | OUTPATIENT
Start: 2020-10-27 | End: 2021-03-03

## 2020-11-02 DIAGNOSIS — F41.9 ANXIETY: ICD-10-CM

## 2020-11-02 RX ORDER — ALPRAZOLAM 0.5 MG/1
0.5 TABLET ORAL
Qty: 30 TABLET | Refills: 0 | Status: SHIPPED | OUTPATIENT
Start: 2020-11-02 | End: 2020-12-04 | Stop reason: SDUPTHER

## 2020-11-05 ENCOUNTER — TELEPHONE (OUTPATIENT)
Dept: FAMILY MEDICINE CLINIC | Facility: CLINIC | Age: 55
End: 2020-11-05

## 2020-11-05 DIAGNOSIS — M54.50 LUMBAR BACK PAIN: Primary | ICD-10-CM

## 2020-11-09 ENCOUNTER — EVALUATION (OUTPATIENT)
Dept: PHYSICAL THERAPY | Facility: CLINIC | Age: 55
End: 2020-11-09
Payer: COMMERCIAL

## 2020-11-09 DIAGNOSIS — M54.50 LUMBAR BACK PAIN: Primary | ICD-10-CM

## 2020-11-09 PROCEDURE — 97110 THERAPEUTIC EXERCISES: CPT | Performed by: PHYSICAL THERAPIST

## 2020-11-09 PROCEDURE — 97162 PT EVAL MOD COMPLEX 30 MIN: CPT | Performed by: PHYSICAL THERAPIST

## 2020-11-16 ENCOUNTER — TELEPHONE (OUTPATIENT)
Dept: FAMILY MEDICINE CLINIC | Facility: CLINIC | Age: 55
End: 2020-11-16

## 2020-11-16 ENCOUNTER — APPOINTMENT (OUTPATIENT)
Dept: PHYSICAL THERAPY | Facility: CLINIC | Age: 55
End: 2020-11-16
Payer: COMMERCIAL

## 2020-11-16 DIAGNOSIS — Z20.822 SUSPECTED COVID-19 VIRUS INFECTION: Primary | ICD-10-CM

## 2020-11-18 DIAGNOSIS — Z20.822 SUSPECTED COVID-19 VIRUS INFECTION: ICD-10-CM

## 2020-11-18 PROCEDURE — U0003 INFECTIOUS AGENT DETECTION BY NUCLEIC ACID (DNA OR RNA); SEVERE ACUTE RESPIRATORY SYNDROME CORONAVIRUS 2 (SARS-COV-2) (CORONAVIRUS DISEASE [COVID-19]), AMPLIFIED PROBE TECHNIQUE, MAKING USE OF HIGH THROUGHPUT TECHNOLOGIES AS DESCRIBED BY CMS-2020-01-R: HCPCS | Performed by: FAMILY MEDICINE

## 2020-11-19 LAB — SARS-COV-2 RNA SPEC QL NAA+PROBE: NOT DETECTED

## 2020-11-23 ENCOUNTER — APPOINTMENT (OUTPATIENT)
Dept: PHYSICAL THERAPY | Facility: CLINIC | Age: 55
End: 2020-11-23
Payer: COMMERCIAL

## 2020-12-02 DIAGNOSIS — N95.1 PERIMENOPAUSAL VASOMOTOR SYMPTOMS: ICD-10-CM

## 2020-12-02 RX ORDER — NORETHINDRONE ACETATE AND ETHINYL ESTRADIOL .5; 2.5 MG/1; UG/1
TABLET ORAL
Qty: 30 TABLET | Refills: 1 | Status: SHIPPED | OUTPATIENT
Start: 2020-12-02 | End: 2021-01-25 | Stop reason: SDUPTHER

## 2020-12-02 NOTE — TELEPHONE ENCOUNTER
Per Deandra Sandoval note at last APE - patient taking every other day - can give Rx to reach her next annual 3/2021

## 2020-12-04 DIAGNOSIS — F41.9 ANXIETY: ICD-10-CM

## 2020-12-04 RX ORDER — ALPRAZOLAM 0.5 MG/1
0.5 TABLET ORAL
Qty: 30 TABLET | Refills: 0 | Status: SHIPPED | OUTPATIENT
Start: 2020-12-04 | End: 2021-01-04 | Stop reason: SDUPTHER

## 2021-01-04 DIAGNOSIS — F41.9 ANXIETY: ICD-10-CM

## 2021-01-04 RX ORDER — ALPRAZOLAM 0.5 MG/1
0.5 TABLET ORAL
Qty: 30 TABLET | Refills: 0 | Status: SHIPPED | OUTPATIENT
Start: 2021-01-04 | End: 2021-02-03 | Stop reason: SDUPTHER

## 2021-01-04 NOTE — TELEPHONE ENCOUNTER
Per PDMP last fill 12/04/20   Last OV 10/27/20     12/04/2020 1 12/04/2020 ALPRAZOLAM 0 5 MG TABLET 30 0 30 [facility] BRO [id number] WEGMA ([id number]) 0 [place]   11/02/2020 1 11/02/2020 ALPRAZOLAM 0 5 MG TABLET 30 0 30 [facility] BRO [id number] WEGMA ([id number]) 0 [place]   10/27/2020 1 10/27/2020 HYDROCODONE-ACETAMIN 5-325 MG 20 0 7 [place] BRO [id number] WEGMA ([id number]) 0 14 29 MME [place]

## 2021-01-22 DIAGNOSIS — N95.1 PERIMENOPAUSAL VASOMOTOR SYMPTOMS: ICD-10-CM

## 2021-01-25 RX ORDER — NORETHINDRONE ACETATE AND ETHINYL ESTRADIOL .5; 2.5 MG/1; UG/1
1 TABLET ORAL DAILY
Qty: 30 TABLET | Refills: 0 | Status: SHIPPED | OUTPATIENT
Start: 2021-01-25 | End: 2021-03-03 | Stop reason: SDUPTHER

## 2021-01-25 RX ORDER — NORETHINDRONE ACETATE AND ETHINYL ESTRADIOL .5; 2.5 MG/1; UG/1
TABLET ORAL
Qty: 28 TABLET | Refills: 1 | OUTPATIENT
Start: 2021-01-25

## 2021-01-25 NOTE — TELEPHONE ENCOUNTER
Patient has appt scheduled with Ran Tiwari for 3/3/21 - please see if she needs more prescription to get her to the appointment thanks

## 2021-02-03 DIAGNOSIS — F41.9 ANXIETY: ICD-10-CM

## 2021-02-03 RX ORDER — ALPRAZOLAM 0.5 MG/1
0.5 TABLET ORAL
Qty: 30 TABLET | Refills: 0 | Status: SHIPPED | OUTPATIENT
Start: 2021-02-03 | End: 2021-03-03

## 2021-02-03 NOTE — TELEPHONE ENCOUNTER
Per PDMP last fill 01/04/21  Last OV 10/27/20    01/04/2021 1 01/04/2021   ALPRAZOLAM 0 5 MG TABLET  30 0 30 CA BRO [id number] WEGMA ([id number]) 0  [place]    12/04/2020 1 12/04/2020   ALPRAZOLAM 0 5 MG TABLET  30 0 30 [facility]O [id number] WEGMA ([id number]) 0  [place]    11/02/2020 1 11/02/2020   ALPRAZOLAM 0 5 MG TABLET  30 0 30 [facility] BRO [id number] WEGMA ([id number]) 0  [place]

## 2021-03-02 DIAGNOSIS — F41.9 ANXIETY: ICD-10-CM

## 2021-03-03 ENCOUNTER — ANNUAL EXAM (OUTPATIENT)
Dept: OBGYN CLINIC | Facility: CLINIC | Age: 56
End: 2021-03-03
Payer: COMMERCIAL

## 2021-03-03 VITALS
SYSTOLIC BLOOD PRESSURE: 124 MMHG | HEIGHT: 62 IN | DIASTOLIC BLOOD PRESSURE: 74 MMHG | BODY MASS INDEX: 24.84 KG/M2 | WEIGHT: 135 LBS

## 2021-03-03 DIAGNOSIS — Z12.31 ENCOUNTER FOR SCREENING MAMMOGRAM FOR MALIGNANT NEOPLASM OF BREAST: ICD-10-CM

## 2021-03-03 DIAGNOSIS — N94.10 DYSPAREUNIA IN FEMALE: ICD-10-CM

## 2021-03-03 DIAGNOSIS — N95.1 PERIMENOPAUSAL VASOMOTOR SYMPTOMS: ICD-10-CM

## 2021-03-03 DIAGNOSIS — Z01.419 GYNECOLOGIC EXAM NORMAL: Primary | ICD-10-CM

## 2021-03-03 PROCEDURE — 3008F BODY MASS INDEX DOCD: CPT | Performed by: PHYSICIAN ASSISTANT

## 2021-03-03 PROCEDURE — 99396 PREV VISIT EST AGE 40-64: CPT | Performed by: PHYSICIAN ASSISTANT

## 2021-03-03 PROCEDURE — 1036F TOBACCO NON-USER: CPT | Performed by: PHYSICIAN ASSISTANT

## 2021-03-03 RX ORDER — NORETHINDRONE ACETATE AND ETHINYL ESTRADIOL .5; 2.5 MG/1; UG/1
1 TABLET ORAL EVERY OTHER DAY
Qty: 45 TABLET | Refills: 3 | Status: SHIPPED | OUTPATIENT
Start: 2021-03-03 | End: 2021-05-19 | Stop reason: SDUPTHER

## 2021-03-03 RX ORDER — ALPRAZOLAM 0.5 MG/1
TABLET ORAL
Qty: 30 TABLET | Refills: 0 | Status: SHIPPED | OUTPATIENT
Start: 2021-03-03 | End: 2021-04-03

## 2021-03-03 NOTE — PROGRESS NOTES
Assessment/Plan   Problem List Items Addressed This Visit        Other    Perimenopausal vasomotor symptoms    Relevant Medications    norethindrone-ethinyl estradiol (FEMHRT LOW DOSE) 0 5-2 5 MG-MCG per tablet    Gynecologic exam normal - Primary     Pap guidelines reviewed  Pap deferred secondary to negative pap and HPV in 2020 in this low risk patient  Reviewed pain with intercourse  Reviewed coconut oil and Key E suppositories  Also highly recommend considering pelvic floor physical therapy  Referral given  Return to office for annual or as needed  Other Visit Diagnoses     Encounter for screening mammogram for malignant neoplasm of breast        Relevant Orders    Mammo screening bilateral w 3d & cad    Dyspareunia in female        Relevant Orders    Ambulatory referral to Physical Therapy          Subjective:     Patient ID: Toya Paris is a 50 y o  y o  female  HPI  49 yo seen for annual exam  LMP: 2/2019  No bleeding since  Currently on Femhrt low for hot flashes, takes every other day  Tried cutting back more but hot flashes were too bad  Reports still significant dyspareunia, uses lubrication with little improvement  Does notice that she has a difficult time relaxing her pelvic muscles with intercourse  Last pap: 2/25/2020 NILM (-)HRHPV  Last mammogram: 5/20/2020 BIRADS 1: Negative  Last colonoscopy: 2018 normal    The following portions of the patient's history were reviewed and updated as appropriate:   She  has a past medical history of Anxiety, Arthritis, Asthma, Cancer ([address] ), Fibroid, GERD (gastroesophageal reflux disease), Irritable bowel syndrome, Migraine, PONV (postoperative nausea and vomiting), Skin cancer, and Urinary tract infection    She   Patient Active Problem List    Diagnosis Date Noted    Gynecologic exam normal 03/03/2021    Routine gynecological examination 02/25/2020    Encounter for gynecological examination (general) (routine) without abnormal findings 02/19/2019    Perimenopausal vasomotor symptoms 02/19/2019    Biliary dyskinesia 11/21/2017    Bloating 11/15/2017    Other fatigue 09/06/2017    Perineal irritation 09/06/2017    Vaginal dryness 09/06/2017    Weight gain 09/06/2017    Fever 02/23/2016    Influenza 02/23/2016    Allergic rhinitis 11/09/2015    Neck pain 05/07/2015    Myofascial pain syndrome 05/07/2015    Bulge of cervical disc without myelopathy 04/30/2015    Herniated cervical disc 04/30/2015    Cervical paraspinal muscle spasm 04/20/2015    Reactive airway disease 11/07/2014    Seasonal allergies 11/07/2014    Tracheitis 05/13/2014    Thyroiditis 10/07/2013    Pruritus 10/01/2013    Esophageal reflux 01/01/2013    Anxiety disorder 10/02/2012    Lower back pain 10/01/2012     She  has a past surgical history that includes Tonsillectomy; [place] tooth extraction; Breast surgery (Bilateral); Colonoscopy; pr esophagogastroduodenoscopy transoral diagnostic (N/A, 1/2/2018); Breast cyst aspiration (Bilateral); Colposcopy (2019); and Breast biopsy  Her family history includes Breast cancer in her maternal grandmother; Breast cancer (age of onset: 72) in her mother; Lung cancer in her maternal aunt; Other in her family and mother; Scleroderma in her father  She  reports that she has never smoked  She has never used smokeless tobacco  She reports current alcohol use of about 10 0 standard drinks of alcohol per week  She reports that she does not use drugs    Current Outpatient Medications   Medication Sig Dispense Refill    ALPRAZolam (XANAX) 0 5 mg tablet TAKE 1 TABLET BY MOUTH AT BEDTIME AS NEEDED FOR ANXIETY 30 tablet 0    cetirizine (ZYRTEC ALLERGY) 10 mg tablet Take by mouth      Cholecalciferol (VITAMIN D3) 2000 units TABS Take 1 tablet by mouth daily      citalopram (CeleXA) 40 mg tablet Take 1 tablet (40 mg total) by mouth daily 90 tablet 3    Naproxen Sodium (ALEVE) 220 MG CAPS Take by mouth      norethindrone-ethinyl estradiol (FEMHRT LOW DOSE) 0 5-2 5 MG-MCG per tablet Take 1 tablet by mouth every other day As directed 45 tablet 3    pantoprazole (PROTONIX) 40 mg tablet 1 po bid 60 tablet 3    cyclobenzaprine (FLEXERIL) 10 mg tablet Take 1 tablet (10 mg total) by mouth daily at bedtime for 15 days 15 tablet 0     No current facility-administered medications for this visit  She has No Known Allergies       Menstrual History:  OB History        2    Para   2    Term   2            AB        Living   1       SAB        TAB        Ectopic        Multiple        Live Births   1           Obstetric Comments   :   F              No LMP recorded  Patient is postmenopausal          Review of Systems   Constitutional: Negative for fatigue, fever and unexpected weight change  HENT: Negative for dental problem and sinus pressure  Eyes: Negative for visual disturbance  Respiratory: Negative for cough, shortness of breath and wheezing  Cardiovascular: Negative for chest pain  Gastrointestinal: Negative for abdominal pain, blood in stool, constipation, diarrhea, nausea and vomiting  Endocrine: Negative for polydipsia  Genitourinary: Negative for difficulty urinating, dyspareunia, dysuria, frequency, hematuria, pelvic pain and urgency  Musculoskeletal: Negative for arthralgias and back pain  Neurological: Negative for dizziness, seizures, light-headedness and headaches  Psychiatric/Behavioral: Negative for suicidal ideas  The patient is not nervous/anxious  Objective:  Vitals:    21 1019   BP: 124/74   BP Location: Left arm   Patient Position: Sitting   Cuff Size: Standard   Weight: 61 2 kg (135 lb)   Height: 5' 2" (1 575 m)      Physical Exam  Constitutional:       Appearance: Normal appearance  She is well-developed  Genitourinary:      Pelvic exam was performed with patient supine        Vulva, urethra, bladder, vagina, uterus and rectum normal       No vulval condylomata, lesion, tenderness, ulcerations, Bartholin's cyst or rash noted  No signs of labial injury  No labial fusion  No inguinal adenopathy present in the right or left side  No urethral prolapse, pain, swelling, tenderness, caruncle, mass or diverticulum present  Bladder is not distended or tender  No signs of injury or lesions in the vagina  No vaginal discharge, erythema, tenderness or bleeding  No cervical motion tenderness, discharge or lesion  Uterus is not enlarged, tender, irregular or mobile  No uterine mass detected  No right or left adnexal mass present  Right adnexa not tender or full  Left adnexa not tender or full  Rectum:      Guaiac result negative  No rectal mass, anal fissure, tenderness, external hemorrhoid, internal hemorrhoid or abnormal anal tone  HENT:      Head: Normocephalic and atraumatic  Neck:      Thyroid: No thyromegaly  Cardiovascular:      Rate and Rhythm: Normal rate and regular rhythm  Heart sounds: Normal heart sounds  No murmur  No friction rub  No gallop  Pulmonary:      Effort: Pulmonary effort is normal  No respiratory distress  Breath sounds: Normal breath sounds  No wheezing  Chest:      Breasts: Breasts are symmetrical          Right: Normal  No swelling, bleeding, inverted nipple, mass, nipple discharge, skin change or tenderness  Left: Normal  No swelling, bleeding, inverted nipple, mass, nipple discharge, skin change or tenderness  Abdominal:      General: There is no distension  Palpations: Abdomen is soft  There is no mass  Tenderness: There is no abdominal tenderness  There is no guarding or rebound  Hernia: No hernia is present  Lymphadenopathy:      Cervical: No cervical adenopathy  Upper Body:      Right upper body: No supraclavicular, axillary or pectoral adenopathy        Left upper body: No supraclavicular, axillary or pectoral adenopathy  Lower Body: No right inguinal adenopathy  No left inguinal adenopathy  Neurological:      Mental Status: She is alert and oriented to person, place, and time  Skin:     General: Skin is warm and dry     Psychiatric:         Behavior: Behavior normal

## 2021-03-03 NOTE — ASSESSMENT & PLAN NOTE
Pap guidelines reviewed  Pap deferred secondary to negative pap and HPV in 2020 in this low risk patient  Reviewed pain with intercourse  Reviewed coconut oil and Key E suppositories  Also highly recommend considering pelvic floor physical therapy  Referral given  Return to office for annual or as needed

## 2021-04-03 DIAGNOSIS — F41.9 ANXIETY: ICD-10-CM

## 2021-04-03 RX ORDER — ALPRAZOLAM 0.5 MG/1
TABLET ORAL
Qty: 30 TABLET | Refills: 0 | Status: SHIPPED | OUTPATIENT
Start: 2021-04-03 | End: 2021-05-03

## 2021-04-13 DIAGNOSIS — Z23 ENCOUNTER FOR IMMUNIZATION: ICD-10-CM

## 2021-04-15 ENCOUNTER — TELEPHONE (OUTPATIENT)
Dept: FAMILY MEDICINE CLINIC | Facility: CLINIC | Age: 56
End: 2021-04-15

## 2021-05-03 DIAGNOSIS — F41.9 ANXIETY: ICD-10-CM

## 2021-05-03 RX ORDER — ALPRAZOLAM 0.5 MG/1
TABLET ORAL
Qty: 30 TABLET | Refills: 0 | Status: SHIPPED | OUTPATIENT
Start: 2021-05-03 | End: 2021-06-01

## 2021-05-19 DIAGNOSIS — N95.1 PERIMENOPAUSAL VASOMOTOR SYMPTOMS: ICD-10-CM

## 2021-05-19 RX ORDER — NORETHINDRONE ACETATE AND ETHINYL ESTRADIOL .5; 2.5 MG/1; UG/1
1 TABLET ORAL DAILY
Qty: 90 TABLET | Refills: 2 | Status: SHIPPED | OUTPATIENT
Start: 2021-05-19 | End: 2022-04-29

## 2021-05-19 NOTE — TELEPHONE ENCOUNTER
Pt called her insurance since when she went to  her HRT the price almost tripled  Her insurance told her since she does not require a 90 day supply with her taking the medication every other day that is why there is a price increase  Pt requesting a 90 day supply  Rx sent for 90 days  Please review

## 2021-06-01 DIAGNOSIS — F41.9 ANXIETY: ICD-10-CM

## 2021-06-01 RX ORDER — ALPRAZOLAM 0.5 MG/1
TABLET ORAL
Qty: 30 TABLET | Refills: 0 | Status: SHIPPED | OUTPATIENT
Start: 2021-06-01 | End: 2021-06-29

## 2021-06-14 DIAGNOSIS — F32.A DEPRESSION, UNSPECIFIED DEPRESSION TYPE: ICD-10-CM

## 2021-06-14 RX ORDER — CITALOPRAM 40 MG/1
40 TABLET ORAL DAILY
Qty: 90 TABLET | Refills: 0 | Status: SHIPPED | OUTPATIENT
Start: 2021-06-14 | End: 2021-08-30

## 2021-06-15 DIAGNOSIS — K21.9 GASTROESOPHAGEAL REFLUX DISEASE WITHOUT ESOPHAGITIS: ICD-10-CM

## 2021-06-15 RX ORDER — PANTOPRAZOLE SODIUM 40 MG/1
TABLET, DELAYED RELEASE ORAL
Qty: 60 TABLET | Refills: 3 | Status: SHIPPED | OUTPATIENT
Start: 2021-06-15 | End: 2022-03-10

## 2021-06-17 ENCOUNTER — HOSPITAL ENCOUNTER (OUTPATIENT)
Dept: RADIOLOGY | Facility: MEDICAL CENTER | Age: 56
Discharge: HOME | End: 2021-06-17
Payer: COMMERCIAL

## 2021-06-17 VITALS — WEIGHT: 135 LBS | BODY MASS INDEX: 24.84 KG/M2 | HEIGHT: 62 IN

## 2021-06-17 DIAGNOSIS — Z12.31 ENCOUNTER FOR SCREENING MAMMOGRAM FOR MALIGNANT NEOPLASM OF BREAST: ICD-10-CM

## 2021-06-17 PROCEDURE — 77063 BREAST TOMOSYNTHESIS BI: CPT

## 2021-06-17 PROCEDURE — 77067 SCR MAMMO BI INCL CAD: CPT

## 2021-07-13 ENCOUNTER — TELEPHONE (OUTPATIENT)
Dept: FAMILY MEDICINE CLINIC | Facility: CLINIC | Age: 56
End: 2021-07-13

## 2021-07-13 ENCOUNTER — OFFICE VISIT (OUTPATIENT)
Dept: FAMILY MEDICINE CLINIC | Facility: CLINIC | Age: 56
End: 2021-07-13
Payer: COMMERCIAL

## 2021-07-13 VITALS
TEMPERATURE: 97.3 F | OXYGEN SATURATION: 99 % | HEIGHT: 62 IN | BODY MASS INDEX: 25.4 KG/M2 | WEIGHT: 138 LBS | DIASTOLIC BLOOD PRESSURE: 78 MMHG | HEART RATE: 90 BPM | SYSTOLIC BLOOD PRESSURE: 122 MMHG

## 2021-07-13 DIAGNOSIS — J01.90 ACUTE SINUSITIS, RECURRENCE NOT SPECIFIED, UNSPECIFIED LOCATION: Primary | ICD-10-CM

## 2021-07-13 PROCEDURE — 99213 OFFICE O/P EST LOW 20 MIN: CPT | Performed by: FAMILY MEDICINE

## 2021-07-13 PROCEDURE — 3008F BODY MASS INDEX DOCD: CPT | Performed by: FAMILY MEDICINE

## 2021-07-13 PROCEDURE — 1036F TOBACCO NON-USER: CPT | Performed by: FAMILY MEDICINE

## 2021-07-13 RX ORDER — PREDNISONE 10 MG/1
TABLET ORAL
Qty: 26 TABLET | Refills: 0 | Status: SHIPPED | OUTPATIENT
Start: 2021-07-13 | End: 2021-10-26 | Stop reason: ALTCHOICE

## 2021-07-13 RX ORDER — BENZONATATE 200 MG/1
200 CAPSULE ORAL 3 TIMES DAILY PRN
Qty: 30 CAPSULE | Refills: 0 | Status: SHIPPED | OUTPATIENT
Start: 2021-07-13 | End: 2021-10-26 | Stop reason: ALTCHOICE

## 2021-07-13 RX ORDER — AZITHROMYCIN 250 MG/1
TABLET, FILM COATED ORAL
Qty: 6 TABLET | Refills: 0 | Status: SHIPPED | OUTPATIENT
Start: 2021-07-13 | End: 2021-07-17

## 2021-07-13 NOTE — PROGRESS NOTES
Patient ID: Val Goldsmith is a 49 y o  female  HPI: 49 y  o female presenting with symptoms of sinus pain,pressure, nasal congestion, pnd dry cough , ear and throat pain  Symptoms for past 4 weeks  SUBJECTIVE    Family History   Problem Relation Age of Onset    Breast cancer Mother 72        dx in 57's    Other Mother         stenosis    Scleroderma Father     Breast cancer Maternal Grandmother         dx in [de-identified] Other Family         back disorder    No Known Problems Daughter     Lung cancer Maternal Aunt     No Known Problems Maternal Grandfather     No Known Problems Paternal Grandmother     No Known Problems Paternal Grandfather     No Known Problems Son     No Known Problems Maternal Aunt      Social History     Socioeconomic History    Marital status: /Civil Union     Spouse name: Not on file    Number of children: Not on file    Years of education: Not on file    Highest education level: Not on file   Occupational History    Not on file   Tobacco Use    Smoking status: Never Smoker    Smokeless tobacco: Never Used   Substance and Sexual Activity    Alcohol use: Yes     Alcohol/week: 10 0 standard drinks     Types: 10 Glasses of wine per week     Comment: daily    Drug use: No    Sexual activity: Yes     Partners: Male   Other Topics Concern    Not on file   Social History Narrative    Drinks coffee daily     Drinks cola daily    Drinks tea daily     Social Determinants of Health     Financial Resource Strain:     Difficulty of Paying Living Expenses:    Food Insecurity:     Worried About Running Out of Food in the Last Year:     Ran Out of Food in the Last Year:    Transportation Needs:     Lack of Transportation (Medical):      Lack of Transportation (Non-Medical):    Physical Activity:     Days of Exercise per Week:     Minutes of Exercise per Session:    Stress:     Feeling of Stress :    Social Connections:     Frequency of Communication with Friends and Family:     Frequency of Social Gatherings with Friends and Family:     Attends Shinto Services:     Active Member of Clubs or Organizations:     Attends Club or Organization Meetings:     Marital Status:    Intimate Partner Violence:     Fear of Current or Ex-Partner:     Emotionally Abused:     Physically Abused:     Sexually Abused:      Past Medical History:   Diagnosis Date    Anxiety     Arthritis     Asthma     [facility] [address] [address]     Skin    basal cell carcinoma    Fibroid     20 plus years ago   one in each breast    GERD (gastroesophageal reflux disease)     Irritable bowel syndrome     Migraine     Occasional    PONV (postoperative nausea and vomiting)     Skin cancer     Urinary tract infection     Occasionally     Past Surgical History:   Procedure Laterality Date    BREAST BIOPSY      20 plus years ago   fibroids    BREAST CYST ASPIRATION Bilateral     BREAST SURGERY Bilateral     cyst removal    COLONOSCOPY      COLPOSCOPY  2019    DE ESOPHAGOGASTRODUODENOSCOPY TRANSORAL DIAGNOSTIC N/A 1/2/2018    Procedure: EGD AND COLONOSCOPY;  Surgeon: Crow Perez MD;  [facility];   Service: Gastroenterology    TONSILLECTOMY      WISDOM TOOTH EXTRACTION       No Known Allergies    Current Outpatient Medications:     ALPRAZolam (XANAX) 0 5 mg tablet, TAKE 1 TABLET BY MOUTH AT BEDTIME AS NEEDED ANXIETY, Disp: 30 tablet, Rfl: 0    cetirizine (ZYRTEC ALLERGY) 10 mg tablet, Take by mouth, Disp: , Rfl:     Cholecalciferol (VITAMIN D3) 2000 units TABS, Take 1 tablet by mouth daily, Disp: , Rfl:     citalopram (CeleXA) 40 mg tablet, Take 1 tablet (40 mg total) by mouth daily, Disp: 90 tablet, Rfl: 0    Naproxen Sodium (ALEVE) 220 MG CAPS, Take by mouth, Disp: , Rfl:     norethindrone-ethinyl estradiol (FEMHRT LOW DOSE) 0 5-2 5 MG-MCG per tablet, Take 1 tablet by mouth daily As directed, Disp: 90 tablet, Rfl: 2    pantoprazole (PROTONIX) 40 mg tablet, 1 po bid, Disp: 60 tablet, Rfl: 3    azithromycin (ZITHROMAX) 250 mg tablet, Take 2 tablets today then 1 tablet daily x 4 days, Disp: 6 tablet, Rfl: 0    benzonatate (TESSALON) 200 MG capsule, Take 1 capsule (200 mg total) by mouth 3 (three) times a day as needed for cough, Disp: 30 capsule, Rfl: 0    predniSONE 10 mg tablet, 3 tabs po bid x2 days, then 2 tabs po bid x2 days, then 1 tab bid x2 days, then 1 daily until done , Disp: 26 tablet, Rfl: 0    Review of Systems  Constitutional:     Denies fever, chills, fatigue, weakness ,weight loss, weight gain      ENT: Denies earache, loss of hearing, nosebleed, nasal discharge,but complains of nasal congestion, sore throat,hoarseness and sinus pain and pressure    Pulmonary: Denies shortness of breath ,cough , dyspnea on exertionon, orthopnea ,+ PND   Cardiovascular:  Denies bradycardia , tachycardia ,palpations, lower extremity, edema leg, claudication  Breast:  Denies new or changing breast lumps,  nipple discharge, nipple changes,  Abdomen:  Denies abdominal pain , anorexia ,indigestion, nausea ,vomiting, constipation , diarrhea  Musculoskeletal: Denies myalgias, arthralgias, joint swelling, joint stiffness ,limb pain, limb swelling  Lymph:+ swollen glands  Gu: no dysuria or urinary frequency  Skin: Denies skin rash, skin lesion, skin wound, itching,dry skin  Neuro: Denies headache, numbness, tingling, confusion, loss of consciousness, dizziness ,vertigo  Psychiatric: Denies feelings of depression, suicidal ideation, anxiety, sleep disturbances    OBJECTIVE  /78   Pulse 90   Temp (!) 97 3 °F (36 3 °C)   Ht 5' 2" (1 575 m)   Wt 62 6 kg (138 lb)   SpO2 99%   BMI 25 24 kg/m²   Constitutional:   NAD, well appearing and well nourished      ENT:   Conjunctiva and lids: no injection, edema, or discharge    Pupils and iris: NAVDEEP bilaterally   External inspection of ears and nose: normal without deformities or discharge        Otoscopic exam: Canals patent ; tm are dull, with with erythem and effusions  ENasal mucosa, septum and turbinates: Turbinae injection with discharge   Oropharynx:  Moist mucosa, normal tongue and tonsils without lesions  Erythema and injection  of post pharynx with pnd      Pulmonary:Respiratory effort normal rate and rhythm, no increased work of breathing  Auscultation of lungs:  Clear bilaterally with no adventitious breath sounds       Cardiovascular: regular rate and rhythm, S1 and S2, no murmur, no edema and/or varicosities of LE      Abdomen: Soft and non-distended    Positive bowel sounds    No heptomegaly or splenomegaly    Lymphatic: Anterior  cervical lymphadenopathy         Muscskeletal:  Gait and station: Normal gait     Digits and nails normal without clubbing or cyanosis     Inspection/palpation of joints, bones, and muscles:  No joint tenderness, swelling, full active and passive range of motion      Gu: no suprabubic tenderness, CVA tenderness or urethral discharge  Skin: Normal skin turgor and no rashes    Neuro:    Normal reflexes   Psych:   alert and oriented to person, place and time  normal mood and affect      Assessment/Plan:Diagnoses and all orders for this visit:    Acute sinusitis, recurrence not specified, unspecified location  -     benzonatate (TESSALON) 200 MG capsule; Take 1 capsule (200 mg total) by mouth 3 (three) times a day as needed for cough  -     azithromycin (ZITHROMAX) 250 mg tablet; Take 2 tablets today then 1 tablet daily x 4 days  -     predniSONE 10 mg tablet; 3 tabs po bid x2 days, then 2 tabs po bid x2 days, then 1 tab bid x2 days, then 1 daily until done  Reviewed with patient plan to treat with above plan      Patient instructed to call in 72 hours if not feeling better or if symptoms worsen

## 2021-07-13 NOTE — TELEPHONE ENCOUNTER
Patient called  She has cough, blocked crackling ears, nasal congestion, and a constant cough  This has been going on for 4 weeks  She wouldn't exactly say chest congestion, but she does occasionally bring up clear phlegm  She has tried Claritan, flonase, otc cough med, delsym, mucinex with no relief   She would like to come in and see you

## 2021-08-29 DIAGNOSIS — F41.9 ANXIETY: ICD-10-CM

## 2021-08-29 DIAGNOSIS — F32.A DEPRESSION, UNSPECIFIED DEPRESSION TYPE: ICD-10-CM

## 2021-08-30 RX ORDER — CITALOPRAM 40 MG/1
TABLET ORAL
Qty: 90 TABLET | Refills: 0 | Status: SHIPPED | OUTPATIENT
Start: 2021-08-30 | End: 2021-12-01 | Stop reason: SDUPTHER

## 2021-08-30 RX ORDER — ALPRAZOLAM 0.5 MG/1
TABLET ORAL
Qty: 30 TABLET | Refills: 0 | Status: SHIPPED | OUTPATIENT
Start: 2021-08-30 | End: 2021-09-28 | Stop reason: SDUPTHER

## 2021-08-30 NOTE — TELEPHONE ENCOUNTER
Per PDMP last fill 08/02/21   Last OV 07/13/21 08/02/2021 1 08/02/2021   ALPRAZOLAM 0 5 MG TABLET  30 0 30 RU [facility]   [id number]  WEGMA ([id number]) 0  [place]    06/29/2021 1 06/29/2021   ALPRAZOLAM 0 5 MG TABLET  30 0 30 RU [facility]   [id number]  WEGMA ([id number]) 0  [place]    06/01/2021 1 06/01/2021   ALPRAZOLAM 0 5 MG TABLET  30 0 30 RU [facility]   [id number]  WEGMA ([id number]) 0  Comm Ins

## 2021-09-28 DIAGNOSIS — F41.9 ANXIETY: ICD-10-CM

## 2021-09-28 RX ORDER — ALPRAZOLAM 0.5 MG/1
0.5 TABLET ORAL
Qty: 30 TABLET | Refills: 1 | Status: SHIPPED | OUTPATIENT
Start: 2021-09-28 | End: 2021-12-01 | Stop reason: SDUPTHER

## 2021-09-28 NOTE — TELEPHONE ENCOUNTER
08/30/2021 1 08/30/2021   ALPRAZOLAM 0 5 MG TABLET  30 0 30 CA BRO   [id number]  WEGMA ([id number]) 0  Comm [place]    08/02/2021 1 08/02/2021   ALPRAZOLAM 0 5 MG TABLET  30 0 30 RU [facility]   [id number]  WEGMA ([id number]) 0  [place]    06/29/2021 1 06/29/2021   ALPRAZOLAM 0 5 MG TABLET  30 0 30 RU [facility]   [id number]  WEGMA ([id number]) 0  Comm Ins [place]

## 2021-10-26 ENCOUNTER — OFFICE VISIT (OUTPATIENT)
Dept: FAMILY MEDICINE CLINIC | Facility: CLINIC | Age: 56
End: 2021-10-26
Payer: COMMERCIAL

## 2021-10-26 ENCOUNTER — TELEPHONE (OUTPATIENT)
Dept: OTHER | Facility: OTHER | Age: 56
End: 2021-10-26

## 2021-10-26 ENCOUNTER — APPOINTMENT (OUTPATIENT)
Dept: LAB | Facility: CLINIC | Age: 56
End: 2021-10-26
Payer: COMMERCIAL

## 2021-10-26 VITALS
BODY MASS INDEX: 25.28 KG/M2 | SYSTOLIC BLOOD PRESSURE: 114 MMHG | DIASTOLIC BLOOD PRESSURE: 72 MMHG | HEART RATE: 72 BPM | RESPIRATION RATE: 16 BRPM | TEMPERATURE: 99 F | WEIGHT: 137.4 LBS | HEIGHT: 62 IN

## 2021-10-26 DIAGNOSIS — E55.9 VITAMIN D DEFICIENCY: ICD-10-CM

## 2021-10-26 DIAGNOSIS — F41.1 GENERALIZED ANXIETY DISORDER: ICD-10-CM

## 2021-10-26 DIAGNOSIS — Z23 NEED FOR VACCINATION: ICD-10-CM

## 2021-10-26 DIAGNOSIS — K21.00 GASTROESOPHAGEAL REFLUX DISEASE WITH ESOPHAGITIS WITHOUT HEMORRHAGE: ICD-10-CM

## 2021-10-26 DIAGNOSIS — E78.00 HYPERCHOLESTEROLEMIA: ICD-10-CM

## 2021-10-26 DIAGNOSIS — R53.83 OTHER FATIGUE: ICD-10-CM

## 2021-10-26 DIAGNOSIS — H68.003 EUSTACHIAN CATARRH, BILATERAL: Primary | ICD-10-CM

## 2021-10-26 LAB
25(OH)D3 SERPL-MCNC: 60.6 NG/ML (ref 30–100)
ALBUMIN SERPL BCP-MCNC: 4 G/DL (ref 3.5–5)
ALP SERPL-CCNC: 69 U/L (ref 46–116)
ALT SERPL W P-5'-P-CCNC: 20 U/L (ref 12–78)
ANION GAP SERPL CALCULATED.3IONS-SCNC: 5 MMOL/L (ref 4–13)
AST SERPL W P-5'-P-CCNC: 19 U/L (ref 5–45)
BILIRUB SERPL-MCNC: 0.82 MG/DL (ref 0.2–1)
BUN SERPL-MCNC: 16 MG/DL (ref 5–25)
CALCIUM SERPL-MCNC: 10 MG/DL (ref 8.3–10.1)
CHLORIDE SERPL-SCNC: 104 MMOL/L (ref 100–108)
CHOLEST SERPL-MCNC: 223 MG/DL (ref 50–200)
CO2 SERPL-SCNC: 28 MMOL/L (ref 21–32)
CREAT SERPL-MCNC: 0.86 MG/DL (ref 0.6–1.3)
GFR SERPL CREATININE-BSD FRML MDRD: 77 ML/MIN/1.73SQ M
GLUCOSE P FAST SERPL-MCNC: 83 MG/DL (ref 65–99)
HDLC SERPL-MCNC: 66 MG/DL
LDLC SERPL CALC-MCNC: 139 MG/DL (ref 0–100)
POTASSIUM SERPL-SCNC: 4.4 MMOL/L (ref 3.5–5.3)
PROT SERPL-MCNC: 7.6 G/DL (ref 6.4–8.2)
SODIUM SERPL-SCNC: 137 MMOL/L (ref 136–145)
TRIGL SERPL-MCNC: 90 MG/DL
TSH SERPL DL<=0.05 MIU/L-ACNC: 0.8 UIU/ML (ref 0.36–3.74)

## 2021-10-26 PROCEDURE — 99214 OFFICE O/P EST MOD 30 MIN: CPT | Performed by: FAMILY MEDICINE

## 2021-10-26 PROCEDURE — 90682 RIV4 VACC RECOMBINANT DNA IM: CPT | Performed by: FAMILY MEDICINE

## 2021-10-26 PROCEDURE — 3008F BODY MASS INDEX DOCD: CPT | Performed by: FAMILY MEDICINE

## 2021-10-26 PROCEDURE — 1036F TOBACCO NON-USER: CPT | Performed by: FAMILY MEDICINE

## 2021-10-26 PROCEDURE — 82306 VITAMIN D 25 HYDROXY: CPT

## 2021-10-26 PROCEDURE — 80061 LIPID PANEL: CPT

## 2021-10-26 PROCEDURE — 84443 ASSAY THYROID STIM HORMONE: CPT

## 2021-10-26 PROCEDURE — 90471 IMMUNIZATION ADMIN: CPT | Performed by: FAMILY MEDICINE

## 2021-10-26 PROCEDURE — 3725F SCREEN DEPRESSION PERFORMED: CPT | Performed by: FAMILY MEDICINE

## 2021-10-26 PROCEDURE — 36415 COLL VENOUS BLD VENIPUNCTURE: CPT

## 2021-10-26 PROCEDURE — 80053 COMPREHEN METABOLIC PANEL: CPT

## 2021-10-26 RX ORDER — CEFUROXIME AXETIL 500 MG/1
500 TABLET ORAL EVERY 12 HOURS SCHEDULED
Qty: 20 TABLET | Refills: 0 | Status: SHIPPED | OUTPATIENT
Start: 2021-10-26 | End: 2021-11-05

## 2021-10-26 RX ORDER — PREDNISONE 10 MG/1
TABLET ORAL
Qty: 26 TABLET | Refills: 0 | Status: SHIPPED | OUTPATIENT
Start: 2021-10-26 | End: 2022-04-29

## 2021-10-26 RX ORDER — SODIUM FLUORIDE 6 MG/ML
PASTE, DENTIFRICE DENTAL
COMMUNITY
Start: 2021-08-12

## 2021-10-27 PROBLEM — L29.3 PERINEAL IRRITATION: Status: RESOLVED | Noted: 2017-09-06 | Resolved: 2021-10-27

## 2021-12-01 ENCOUNTER — TELEPHONE (OUTPATIENT)
Dept: FAMILY MEDICINE CLINIC | Facility: CLINIC | Age: 56
End: 2021-12-01

## 2021-12-01 DIAGNOSIS — F32.A DEPRESSION, UNSPECIFIED DEPRESSION TYPE: ICD-10-CM

## 2021-12-01 DIAGNOSIS — F41.9 ANXIETY: ICD-10-CM

## 2021-12-01 RX ORDER — CITALOPRAM 40 MG/1
40 TABLET ORAL DAILY
Qty: 90 TABLET | Refills: 3 | Status: SHIPPED | OUTPATIENT
Start: 2021-12-01

## 2021-12-01 RX ORDER — ALPRAZOLAM 0.5 MG/1
0.5 TABLET ORAL
Qty: 30 TABLET | Refills: 1 | Status: SHIPPED | OUTPATIENT
Start: 2021-12-01 | End: 2022-01-31 | Stop reason: SDUPTHER

## 2022-01-31 DIAGNOSIS — F41.9 ANXIETY: ICD-10-CM

## 2022-01-31 RX ORDER — ALPRAZOLAM 0.5 MG/1
0.5 TABLET ORAL
Qty: 30 TABLET | Refills: 1 | Status: SHIPPED | OUTPATIENT
Start: 2022-01-31 | End: 2022-03-22 | Stop reason: SDUPTHER

## 2022-01-31 NOTE — TELEPHONE ENCOUNTER
11/29/2021  1  10/22/2021  ALPRAZOLAM 0 25 MG TABLET  90 0  30  CA [facility]  [id number]  [facility] ([id number])  0

## 2022-03-10 DIAGNOSIS — K21.9 GASTROESOPHAGEAL REFLUX DISEASE WITHOUT ESOPHAGITIS: ICD-10-CM

## 2022-03-10 RX ORDER — PANTOPRAZOLE SODIUM 40 MG/1
TABLET, DELAYED RELEASE ORAL
Qty: 60 TABLET | Refills: 3 | Status: SHIPPED | OUTPATIENT
Start: 2022-03-10

## 2022-03-22 DIAGNOSIS — F41.9 ANXIETY: ICD-10-CM

## 2022-03-22 RX ORDER — ALPRAZOLAM 0.5 MG/1
0.5 TABLET ORAL
Qty: 30 TABLET | Refills: 1 | Status: SHIPPED | OUTPATIENT
Start: 2022-03-22 | End: 2022-05-23 | Stop reason: SDUPTHER

## 2022-03-22 NOTE — TELEPHONE ENCOUNTER
[id number] 02/23/2022 01/31/2022 ALPRAZolam (Tablet)  30 0 30 0 5 MG NA 24 [facility] 'R'  01 / 1 [place]    1  [id number] 01/31/2022 01/31/2022 ALPRAZolam (Tablet)  30 0 30 0 5 MG NA MELISSA PARRA  [address]  Commercial [place] 0 / 1 [place]    Pt requested Rx  PDMP above   Rx sent to provider for approval

## 2022-04-29 ENCOUNTER — TELEPHONE (OUTPATIENT)
Dept: ADMINISTRATIVE | Facility: OTHER | Age: 57
End: 2022-04-29

## 2022-04-29 ENCOUNTER — ANNUAL EXAM (OUTPATIENT)
Dept: OBGYN CLINIC | Facility: CLINIC | Age: 57
End: 2022-04-29
Payer: COMMERCIAL

## 2022-04-29 VITALS
WEIGHT: 137 LBS | SYSTOLIC BLOOD PRESSURE: 120 MMHG | BODY MASS INDEX: 25.21 KG/M2 | HEIGHT: 62 IN | DIASTOLIC BLOOD PRESSURE: 76 MMHG

## 2022-04-29 DIAGNOSIS — Z01.419 GYNECOLOGIC EXAM NORMAL: Primary | ICD-10-CM

## 2022-04-29 DIAGNOSIS — Z12.31 ENCOUNTER FOR SCREENING MAMMOGRAM FOR BREAST CANCER: ICD-10-CM

## 2022-04-29 PROBLEM — N95.1 PERIMENOPAUSAL VASOMOTOR SYMPTOMS: Status: RESOLVED | Noted: 2019-02-19 | Resolved: 2022-04-29

## 2022-04-29 PROBLEM — K82.8 BILIARY DYSKINESIA: Status: RESOLVED | Noted: 2017-11-21 | Resolved: 2022-04-29

## 2022-04-29 PROCEDURE — 99396 PREV VISIT EST AGE 40-64: CPT | Performed by: PHYSICIAN ASSISTANT

## 2022-04-29 NOTE — PATIENT INSTRUCTIONS
There are some other supplements to help with night sweats and hot flashes including Evening Primrose oil, Black Cohosh, Vitamin E  Unfortunately there are limited studies on these supplements and the studies we do have show that there is no benefit when compared to placebo group but I do have patient's there swear by them  We have certainly found that dietary modifications such as avoiding caffeine, alcohol, spicy foods can help decrease intensity of hot flashes and are often triggers for hot flashes and night sweats  Some women also find that increasing plant based estrogens in their diet such as isoflavones (soy, chickpeas, lentils, flaxseed, grains, beans, fruits and vegetables) can help as well  Weight loss can reduce symptoms too  Recommend Calcium 1200mg in two divided doses  Vitamin D3 2,000-4,000 IU daily   Weight bearing exercises 3-4x's a week for 30-40min

## 2022-04-29 NOTE — TELEPHONE ENCOUNTER
----- Message from Anabel Vaughn PA-C sent at 4/29/2022 11:05 AM EDT -----  04/29/22 11:05 AM     Hello, our patient Pernell Inman had a Colonoscopy  completed/performed  Please assist in updating the patient chart by making in note section of chart done 1/2/2018 at [facility] [place] [address] by Dr Ramesh Cobb       Thank you,   Anabel Vaughn PA-C   [address] [place] [address]

## 2022-04-29 NOTE — ASSESSMENT & PLAN NOTE
Pap guidelines reviewed  Pap deferred secondary to negative pap and HPV in 2020 in this low risk patient  Continue yearly mammograms, script given  There are some other supplements to help with night sweats and hot flashes including Evening Primrose oil, Black Cohosh, Vitamin E  Unfortunately there are limited studies on these supplements and the studies we do have show that there is no benefit when compared to placebo group but I do have patient's there swear by them  We have certainly found that dietary modifications such as avoiding caffeine, alcohol, spicy foods can help decrease intensity of hot flashes and are often triggers for hot flashes and night sweats  Some women also find that increasing plant based estrogens in their diet such as isoflavones (soy, chickpeas, lentils, flaxseed, grains, beans, fruits and vegetables) can help as well  Weight loss can reduce symptoms too  Recommend Calcium 1200mg in two divided doses  Vitamin D3 2,000-4,000 IU daily   Weight bearing exercises 3-4x's a week for 30-40min  Return to office for annual or as needed

## 2022-04-29 NOTE — PROGRESS NOTES
Assessment/Plan   Problem List Items Addressed This Visit        Other    Gynecologic exam normal - Primary     Pap guidelines reviewed  Pap deferred secondary to negative pap and HPV in 2020 in this low risk patient  Continue yearly mammograms, script given  There are some other supplements to help with night sweats and hot flashes including Evening Primrose oil, Black Cohosh, Vitamin E  Unfortunately there are limited studies on these supplements and the studies we do have show that there is no benefit when compared to placebo group but I do have patient's there swear by them  We have certainly found that dietary modifications such as avoiding caffeine, alcohol, spicy foods can help decrease intensity of hot flashes and are often triggers for hot flashes and night sweats  Some women also find that increasing plant based estrogens in their diet such as isoflavones (soy, chickpeas, lentils, flaxseed, grains, beans, fruits and vegetables) can help as well  Weight loss can reduce symptoms too  Recommend Calcium 1200mg in two divided doses  Vitamin D3 2,000-4,000 IU daily   Weight bearing exercises 3-4x's a week for 30-40min  Return to office for annual or as needed  Other Visit Diagnoses     Encounter for screening mammogram for breast cancer        Relevant Orders    Mammo screening bilateral w 3d & cad          Subjective:     Patient ID: Loreta Lea is a 56 y o  y o  female  HPI  55 yo seen for annual exam  LMP: 2/2019 No bleeding since  Denies bowel or bladder issues  Reports hot flashes and night sweats, tolerable  Last pap: 2/25/2020 NILM (-)HRHPV  Last mammogram: 6/17/2021 BIRADS 1: Negative  Last colonoscopy: 1/2/2018 due 10 years     The following portions of the patient's history were reviewed and updated as appropriate:   She  has a past medical history of Anxiety, Arthritis, Asthma, Cancer ([address] ), Fibroid, GERD (gastroesophageal reflux disease), Irritable bowel syndrome, Migraine, PONV (postoperative nausea and vomiting), Skin cancer, and Urinary tract infection  She   Patient Active Problem List    Diagnosis Date Noted    Gynecologic exam normal 03/03/2021    Routine gynecological examination 02/25/2020    Encounter for gynecological examination (general) (routine) without abnormal findings 02/19/2019    Bloating 11/15/2017    Other fatigue 09/06/2017    Vaginal dryness 09/06/2017    Weight gain 09/06/2017    Allergic rhinitis 11/09/2015    Neck pain 05/07/2015    Bulge of cervical disc without myelopathy 04/30/2015    Herniated cervical disc 04/30/2015    Cervical paraspinal muscle spasm 04/20/2015    Seasonal allergies 11/07/2014    Esophageal reflux 01/01/2013    Anxiety disorder 10/02/2012    Lower back pain 10/01/2012     She  has a past surgical history that includes Tonsillectomy; [place] tooth extraction; Breast surgery (Bilateral); Colonoscopy; pr esophagogastroduodenoscopy transoral diagnostic (N/A, 1/2/2018); Breast cyst aspiration (Bilateral); Colposcopy (2019); and Breast biopsy  Her family history includes Breast cancer in her maternal grandmother; Breast cancer (age of onset: 72) in her mother; Lung cancer in her maternal aunt; No Known Problems in her daughter, maternal aunt, maternal grandfather, paternal grandfather, paternal grandmother, and son; Osteoporosis in her mother; Other in her family and mother; Scleroderma in her father  She  reports that she has never smoked  She has never used smokeless tobacco  She reports current alcohol use of about 10 0 standard drinks of alcohol per week  She reports that she does not use drugs    Current Outpatient Medications   Medication Sig Dispense Refill    ALPRAZolam (XANAX) 0 5 mg tablet Take 1 tablet (0 5 mg total) by mouth daily at bedtime as needed for anxiety 30 tablet 1    cetirizine (ZYRTEC ALLERGY) 10 mg tablet Take by mouth      Cholecalciferol (VITAMIN D3) 2000 units TABS Take 1 tablet by mouth daily      citalopram (CeleXA) 40 mg tablet Take 1 tablet (40 mg total) by mouth daily 90 tablet 3    Naproxen Sodium (ALEVE) 220 MG CAPS Take by mouth      pantoprazole (PROTONIX) 40 mg tablet TAKE 1 TABLET BY MOUTH TWO TIMES DAILY 60 tablet 3    Sodium Fluoride 5000 PPM 1 1 % PSTE USE AS DIRECTED EVERY DAY AT BEDTIME       No current facility-administered medications for this visit  She has No Known Allergies       Menstrual History:  OB History        2    Para   2    Term   2            AB        Living   1       SAB        IAB        Ectopic        Multiple        Live Births   1           Obstetric Comments   :   F              No LMP recorded  Patient is postmenopausal          Review of Systems   Constitutional: Negative for fatigue, fever and unexpected weight change  HENT: Negative for dental problem and sinus pressure  Eyes: Negative for visual disturbance  Respiratory: Negative for cough, shortness of breath and wheezing  Cardiovascular: Negative for chest pain  Gastrointestinal: Negative for abdominal pain, blood in stool, constipation, diarrhea, nausea and vomiting  Endocrine: Negative for polydipsia  Genitourinary: Negative for difficulty urinating, dyspareunia, dysuria, frequency, hematuria, pelvic pain and urgency  Musculoskeletal: Negative for arthralgias and back pain  Neurological: Negative for dizziness, seizures, light-headedness and headaches  Psychiatric/Behavioral: Negative for suicidal ideas  The patient is not nervous/anxious  Objective:  Vitals:    22 1055   BP: 120/76   BP Location: Left arm   Patient Position: Sitting   Cuff Size: Standard   Weight: 62 1 kg (137 lb)   Height: 5' 2" (1 575 m)      Physical Exam  Constitutional:       Appearance: Normal appearance  She is well-developed  Genitourinary:      Vulva and bladder normal       No lesions in the vagina        Right Labia: No rash, tenderness, lesions or skin changes  Left Labia: No tenderness, lesions, skin changes or rash  No labial fusion noted  No inguinal adenopathy present in the right or left side  No vaginal discharge, erythema, tenderness or bleeding  Right Adnexa: not tender, not full and no mass present  Left Adnexa: not tender, not full and no mass present  No cervical motion tenderness, discharge or lesion  Uterus is not enlarged, tender or irregular  No uterine mass detected  No urethral prolapse, tenderness or mass present  Bladder is not tender  Breasts: Breasts are symmetrical       Right: No swelling, bleeding, inverted nipple, mass, nipple discharge, skin change, tenderness, axillary adenopathy or supraclavicular adenopathy  Left: No swelling, bleeding, inverted nipple, mass, nipple discharge, skin change, tenderness, axillary adenopathy or supraclavicular adenopathy  HENT:      Head: Normocephalic and atraumatic  Neck:      Thyroid: No thyromegaly  Cardiovascular:      Rate and Rhythm: Normal rate and regular rhythm  Heart sounds: Normal heart sounds  No murmur heard  No friction rub  No gallop  Pulmonary:      Effort: Pulmonary effort is normal  No respiratory distress  Breath sounds: Normal breath sounds  No wheezing  Abdominal:      General: There is no distension  Palpations: Abdomen is soft  There is no mass  Tenderness: There is no abdominal tenderness  There is no guarding or rebound  Hernia: No hernia is present  Lymphadenopathy:      Cervical: No cervical adenopathy  Upper Body:      Right upper body: No supraclavicular, axillary or pectoral adenopathy  Left upper body: No supraclavicular, axillary or pectoral adenopathy  Lower Body: No right inguinal adenopathy  No left inguinal adenopathy  Neurological:      Mental Status: She is alert and oriented to person, place, and time  Skin:     General: Skin is warm and dry  Psychiatric:         Behavior: Behavior normal

## 2022-05-23 DIAGNOSIS — F41.9 ANXIETY: ICD-10-CM

## 2022-05-23 RX ORDER — ALPRAZOLAM 0.5 MG/1
0.5 TABLET ORAL
Qty: 30 TABLET | Refills: 1 | Status: SHIPPED | OUTPATIENT
Start: 2022-05-23 | End: 2022-07-26 | Stop reason: SDUPTHER

## 2022-06-14 ENCOUNTER — TELEPHONE (OUTPATIENT)
Dept: FAMILY MEDICINE CLINIC | Facility: CLINIC | Age: 57
End: 2022-06-14

## 2022-06-14 DIAGNOSIS — M25.519 SHOULDER PAIN, UNSPECIFIED CHRONICITY, UNSPECIFIED LATERALITY: Primary | ICD-10-CM

## 2022-06-14 NOTE — TELEPHONE ENCOUNTER
Pt is asking for a referral to a pain specialist or orthopedic specialist for her back  States she's been having increased numbness and tingling in both her arms along with shoulder pain

## 2022-06-20 ENCOUNTER — HOSPITAL ENCOUNTER (OUTPATIENT)
Dept: RADIOLOGY | Age: 57
Discharge: HOME | End: 2022-06-20
Payer: COMMERCIAL

## 2022-06-20 VITALS — WEIGHT: 135 LBS | HEIGHT: 62 IN | BODY MASS INDEX: 24.84 KG/M2

## 2022-06-20 DIAGNOSIS — Z12.31 ENCOUNTER FOR SCREENING MAMMOGRAM FOR BREAST CANCER: ICD-10-CM

## 2022-06-20 PROCEDURE — 77067 SCR MAMMO BI INCL CAD: CPT

## 2022-06-20 PROCEDURE — 77063 BREAST TOMOSYNTHESIS BI: CPT

## 2022-06-28 ENCOUNTER — OFFICE VISIT (OUTPATIENT)
Dept: OBGYN CLINIC | Facility: OTHER | Age: 57
End: 2022-06-28
Payer: COMMERCIAL

## 2022-06-28 ENCOUNTER — APPOINTMENT (OUTPATIENT)
Dept: RADIOLOGY | Facility: OTHER | Age: 57
End: 2022-06-28
Payer: COMMERCIAL

## 2022-06-28 VITALS
DIASTOLIC BLOOD PRESSURE: 85 MMHG | SYSTOLIC BLOOD PRESSURE: 136 MMHG | HEIGHT: 62 IN | BODY MASS INDEX: 24.84 KG/M2 | WEIGHT: 135 LBS | HEART RATE: 81 BPM

## 2022-06-28 DIAGNOSIS — M54.12 RADICULOPATHY, CERVICAL REGION: Primary | ICD-10-CM

## 2022-06-28 DIAGNOSIS — M25.519 SHOULDER PAIN, UNSPECIFIED CHRONICITY, UNSPECIFIED LATERALITY: ICD-10-CM

## 2022-06-28 PROCEDURE — 73030 X-RAY EXAM OF SHOULDER: CPT

## 2022-06-28 PROCEDURE — 99244 OFF/OP CNSLTJ NEW/EST MOD 40: CPT | Performed by: ORTHOPAEDIC SURGERY

## 2022-06-28 PROCEDURE — 3008F BODY MASS INDEX DOCD: CPT | Performed by: ORTHOPAEDIC SURGERY

## 2022-06-28 PROCEDURE — 1036F TOBACCO NON-USER: CPT | Performed by: ORTHOPAEDIC SURGERY

## 2022-06-28 NOTE — PROGRESS NOTES
Assessment  Diagnoses and all orders for this visit:    Radiculopathy, cervical region  -     Ambulatory Referral to Physical Therapy; Future  -     Ambulatory Referral to Pain Management; Future    Discussion and Plan:    · PT ordered for cervical radiculopathy   · Referral to Pain Management because her pain is not from shoulders  · Patient agreed and understood this plan  · Confirmed with the patient that based on her examination and her clinical symptoms that she does not have any intrinsic shoulder pathology that I can help her with at this time and the patient understands that treatment should be based upon her cervical spine to improve her symptoms      Subjective:   Patient ID: Lulu Gleason is a 56 y o  female      The patient presents with a chief complaint of bilateral shoulder pain and neck pain  The pain began a few month(s) ago and is not associated with an acute injury  She has herniated discs and bulging discs in her neck from two MRIs  The patient describes the pain as aching and burning in intensity,  constant in timing, and localizes the pain to the  bilateral scapular pain that radiates down both arms and into the hands  The pain is worse with movement and relieved by rest   The pain is associated with numbness and tingling into the hands  The pain is not associated with constitutional symptoms  The patient is awoken at night by the pain  The patient has had PT treatment and prednisone multiple times  The patient was referred to see me by her primary care physician Dr Jia Del Cid for this chief complaint    The following portions of the patient's history were reviewed and updated as appropriate: allergies, current medications, past family history, past medical history, past social history, past surgical history and problem list     Review of Systems   Constitutional: Negative for chills and fever  HENT: Negative for ear pain and sore throat      Eyes: Negative for pain and visual disturbance  Respiratory: Negative for cough and shortness of breath  Cardiovascular: Negative for chest pain and palpitations  Gastrointestinal: Negative for abdominal pain and vomiting  Genitourinary: Negative for dysuria and hematuria  Musculoskeletal: Negative for arthralgias and back pain  Skin: Negative for color change and rash  Neurological: Negative for seizures and syncope  All other systems reviewed and are negative  Objective:  /85 (BP Location: Left arm, Patient Position: Sitting, Cuff Size: Standard)   Pulse 81   Ht 5' 2" (1 575 m)   Wt 61 2 kg (135 lb)   BMI 24 69 kg/m²       Right Shoulder Exam   Right shoulder exam is normal     Range of Motion   The patient has normal right shoulder ROM  Muscle Strength   The patient has normal right shoulder strength  Other   Sensation: normal  Pulse: present      Left Shoulder Exam   Left shoulder exam is normal     Range of Motion   The patient has normal left shoulder ROM  Muscle Strength   The patient has normal left shoulder strength  Other   Sensation: normal  Pulse: present     Comments:  Wrist flexion 4/5            Physical Exam  Constitutional:       Appearance: Normal appearance  HENT:      Head: Normocephalic and atraumatic  Right Ear: External ear normal       Left Ear: External ear normal    Eyes:      Extraocular Movements: Extraocular movements intact  Conjunctiva/sclera: Conjunctivae normal    Skin:     General: Skin is warm and dry  Neurological:      Mental Status: She is alert and oriented to person, place, and time  Psychiatric:         Behavior: Behavior normal          Thought Content: Thought content normal          Judgment: Judgment normal            I have personally reviewed pertinent films in PACS and my interpretation is as follows  X-rays of bilateral shoulders demonstrates no acute fractures or osseous abnormalities       Scribe Attestation    I,:  Claudell Crete am acting as a scribe while in the presence of the attending physician :       I,:  Chalice Meckel, MD personally performed the services described in this documentation    as scribed in my presence :

## 2022-07-05 ENCOUNTER — EVALUATION (OUTPATIENT)
Dept: PHYSICAL THERAPY | Facility: CLINIC | Age: 57
End: 2022-07-05
Payer: COMMERCIAL

## 2022-07-05 DIAGNOSIS — M54.12 RADICULOPATHY, CERVICAL REGION: Primary | ICD-10-CM

## 2022-07-05 PROCEDURE — 97162 PT EVAL MOD COMPLEX 30 MIN: CPT | Performed by: PHYSICAL THERAPIST

## 2022-07-05 PROCEDURE — 97112 NEUROMUSCULAR REEDUCATION: CPT | Performed by: PHYSICAL THERAPIST

## 2022-07-05 NOTE — PROGRESS NOTES
PT Evaluation     Today's date: 2022  Patient name: Jessie Thompson  : 1965  MRN: [id number]  Referring provider: Niko Monae*  Dx:   Encounter Diagnosis     ICD-10-CM    1  Radiculopathy, cervical region  M54 12 Ambulatory Referral to Physical Therapy       Start Time: 111  Stop Time: 8831  Total time in clinic (min): 50 minutes    Assessment  Assessment details: Jessie Thompson is a pleasant 56 y o  female who presents with c/c of neck and B/L shoulder pain and radicular symptoms into B/L UEs, R>L  The patient's greatest concerns are the pain she is experiencing, worry over not knowing what's wrong and fear of not being able to keep active  No further referral appears necessary at this time based upon examination results  Primary movement impairment diagnosis of cervical radiculopathy with directional preference for extension resulting in pathoanatomical symptoms of cervical pain and radicular symptoms and limiting her ability to care for self, exercise or recreation, lift, reach overhead and walk  Primary Impairments:  1) Adverse neural tension  2) Impaired postural awareness and control  3) Decreased load tolerance of the UEs     Etiologic factors include repetitive poor body mechanics and repetitive poor lifting mechanics  Impairments: abnormal or restricted ROM, activity intolerance, impaired physical strength, lacks appropriate home exercise program, pain with function and scapular dyskinesis    Symptom irritability: moderateUnderstanding of Dx/Px/POC: good   Prognosis: good  Prognosis details: Positive prognostic indicators include positive attitude toward recovery and good understanding of diagnosis and treatment plan options  Negative prognostic indicators include chronicity of symptoms and degree of peripheralization  Goals  Short Term Goals 2-4 wks   1  Pt will be independent with initial HEP   2  Pt will decrease pain levels to < 5/10 at worst   3   Pt will improve cervical ROM to WNL and pain free     Long Term Goals 6-8 wks  1  Pt will improve deficient mm strength to 5/5 t/o   2  Pt will report no radicular symptoms into B/L UEs  3  Pt will improve FOTO score to >56 by d/c      Plan  Patient would benefit from: skilled physical therapy  Referral necessary: No  Planned modality interventions: Modalities PRN  Planned therapy interventions: activity modification, manual therapy, neuromuscular re-education, patient education, therapeutic activities, therapeutic exercise, graded activity, home exercise program, behavior modification, self care, joint mobilization and postural training  Frequency: 2x week  Duration in weeks: 8  Treatment plan discussed with: patient        Subjective Evaluation    History of Present Illness  Mechanism of injury: Paulette Gonzalez presents with c/c of neck and bilateral shoulder pain with N/T into B/L UEs, R>L  Symptoms began a few months ago with mechanism of injury: insidious  Pt reports hx of cervical radiculopathy about 8 years ago with relief noted with PT  Reports possible change in activity level which could have led to increase in symptoms  Pt saw ortho and was referred to [facility]  and pain management (August 2022)     Aggravating factors: cervical rotation, reaching with the right UE  Relieving factors: Motrin, heat, massage gun  24hr pain pattern: 8/10 (current), 6/10 (best), 8/10 (worst)   Pain location: B/L scapular region, cervical region N/T into B/L UEs, R>L, descriptors: discomfort, aching, shooting, "zing"   Imaging: B/L shoulders (no significant OA, no osseous injury)   Previous treatments: PT approx 7-8 years ago  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; walking the dogs, swim, reading   Sleeping: side sleeper, disturbed sleep, can return to sleep with proper positioning and medication   Primary concern: pain limiting function and ADL performance   Patient goals: improve ADLs, decrease pain, walking without pain          Objective     Postural Observations  Seated posture: poor  Standing posture: fair        Tenderness   Cervical Spine   Tenderness in the spinous process, left transverse process and right transverse process  Neurological Testing     Sensation   Cervical/Thoracic   Left   Intact: light touch    Right   Intact: light touch    Reflexes   Left   Lee's reflex: negative    Right   Lee's reflex: negative    Active Range of Motion   Cervical/Thoracic Spine       Cervical  Subcranial protraction:  WFL   Subcranial retraction:   Restriction level: minimal  Flexion:  Restriction level: minimal  Extension:  Restriction level: minimal  Left lateral flexion:  Restriction level: moderate  Right lateral flexion:  with pain Restriction level moderate  Left rotation:  Restriction level: minimal  Right rotation:  with pain Restriction level: moderate  Left Shoulder   Flexion: WFL  External rotation BTH: T4   Internal rotation BTB: T6     Right Shoulder   Flexion: WFL  External rotation BTH: T4   Internal rotation BTB: T8     Joint Play     Hypomobile: C2, C3, C4, C5, C6 and C7     Pain: C2, C3, C4, C5, C6 and C7   Mechanical Assessment    Cervical    Seated retraction: repeated movements   Pain location: no change  Seated Extension: repeated movements  Pain location: centralized  Pain intensity: better  Pain level: decreased    Thoracic      Lumbar      Tests   Cervical   Positive Sharp-Lanre test   Negative vertical compression and cervical distraction  Left   Negative Spurling's Test A  Right   Negative Spurling's Test A  Left Shoulder   Negative ULTT1  Right Shoulder   Positive ULTT1  Lumbar   Negative vertical compression                Precautions: N/A      Manuals 7/5                                                   Neuro Re-Ed             Median N Glides             Scap Retract              PNF D1/D2             Prone I/T/Y                                       Education  S/S, POC, HEP            Ther Ex             Active Warm Up              TB Row w/ ER             TB Ext w/ Rot              Thoracic Ext              S/L Thoracic Rot                                       Rep RET EXT HEP            Ther Activity                                       Gait Training                                       Modalities

## 2022-07-08 ENCOUNTER — OFFICE VISIT (OUTPATIENT)
Dept: PHYSICAL THERAPY | Facility: CLINIC | Age: 57
End: 2022-07-08
Payer: COMMERCIAL

## 2022-07-08 DIAGNOSIS — M54.12 RADICULOPATHY, CERVICAL REGION: Primary | ICD-10-CM

## 2022-07-08 PROCEDURE — 97110 THERAPEUTIC EXERCISES: CPT | Performed by: PHYSICAL THERAPIST

## 2022-07-08 PROCEDURE — 97140 MANUAL THERAPY 1/> REGIONS: CPT | Performed by: PHYSICAL THERAPIST

## 2022-07-08 PROCEDURE — 97112 NEUROMUSCULAR REEDUCATION: CPT | Performed by: PHYSICAL THERAPIST

## 2022-07-08 NOTE — PROGRESS NOTES
Daily Note     Today's date: 2022  Patient name: Sherrie Moreno  : 1965  MRN: [id number]  Referring provider: Vasquez Vance*  Dx:   Encounter Diagnosis     ICD-10-CM    1  Radiculopathy, cervical region  M54 12        Start Time: 1115  Stop Time: 1205  Total time in clinic (min): 50 minutes    Subjective: Pt reports she has noticed improvement in symptoms in the right UE  Occasional N/T, however, significant decrease in frequency  Continues to have discomfort in the upper thoracic and scapular region  Objective: See treatment diary below      Assessment: Initiated TE and manual program  Tolerated treatment well  Some discomfort reported with exercises, however, no significant increase in symptoms post tx compared to pre tx  Significant mobility restrictions noted in the cervical with some improvement noted with STM and side glides  Advised to continue with HEP  Progress as tolerated  Patient would benefit from continued PT      Plan: Continue per plan of care        Precautions: N/A    Access Code: [email or web address]     Manuals            C/S Distraction & Side Glides   KCB                                     Neuro Re-Ed             Median N Glides  10x supine           Scap Retract   20x3" prone           PNF D1/D2             Prone I/T/Y  20x I/T                                     Education  S/S, POC, HEP            Ther Ex             Active Warm Up   UBE Retro 4'           TB Row w/ ER  20x RTB           TB Ext w/ Rot   20x RTB           Thoracic Ext   10x5"           S/L Thoracic Rot  10x5"           LTR w/ Arms Behind Head  10x5"                        Rep RET EXT HEP            Ther Activity                                       Gait Training                                       Modalities

## 2022-07-12 ENCOUNTER — OFFICE VISIT (OUTPATIENT)
Dept: PHYSICAL THERAPY | Facility: CLINIC | Age: 57
End: 2022-07-12
Payer: COMMERCIAL

## 2022-07-12 DIAGNOSIS — M54.12 RADICULOPATHY, CERVICAL REGION: Primary | ICD-10-CM

## 2022-07-12 PROCEDURE — 97112 NEUROMUSCULAR REEDUCATION: CPT | Performed by: PHYSICAL THERAPIST

## 2022-07-12 PROCEDURE — 97110 THERAPEUTIC EXERCISES: CPT | Performed by: PHYSICAL THERAPIST

## 2022-07-12 NOTE — PROGRESS NOTES
Daily Note     Today's date: 2022  Patient name: Samantha Mcconnell  : 1965  MRN: [id number]  Referring provider: Alicia Haynes*  Dx:   Encounter Diagnosis     ICD-10-CM    1  Radiculopathy, cervical region  M54 12        Start Time: 1200  Stop Time:   Total time in clinic (min): 45 minutes    Subjective: Pt reports she continues to notice improvement with symptoms into the right UE  Continues to have symptoms in the posterior shoulder and scapular region with on significant change noted compared to previous visit  Objective: See treatment diary below      Assessment: Tolerated treatment well  Continued with current POC with good tolerance from the patient  Noted some N/T with with nerve glides, decreased with rest  Improvement of symptoms with repeated retraction extension  Advised to continue with HEP  Progress as tolerated  Patient would benefit from continued PT      Plan: Continue per plan of care        Precautions: N/A    Access Code: [email or web address]     Manuals         C/S Distraction & Side Glides   KCB KCB                              Neuro Re-Ed           Median N Glides  10x supine 10x supine        Scap Retract   20x3" prone 20x3" prone        PNF D1/D2           Prone I/T/Y  20x I/T 20x I/T        TB B/L ER   20x RTB        Active Release w/ Side Bend & Retract   10x 2-3"                   Education  S/S, POC, HEP          Ther Ex           Active Warm Up   UBE Retro 4' UBE Retro 5'        TB Row w/ ER  20x RTB 20x RTB        TB Ext w/ Rot   20x RTB 20x RTB        Thoracic Ext   10x5" 10x5"        S/L Thoracic Rot  10x5" 10x5"        LTR w/ Arms Behind Head  10x5"                    Rep RET EXT HEP  2x10        Ther Activity                                 Gait Training                                 Modalities

## 2022-07-18 ENCOUNTER — OFFICE VISIT (OUTPATIENT)
Dept: PHYSICAL THERAPY | Facility: CLINIC | Age: 57
End: 2022-07-18
Payer: COMMERCIAL

## 2022-07-18 DIAGNOSIS — M54.12 RADICULOPATHY, CERVICAL REGION: Primary | ICD-10-CM

## 2022-07-18 PROCEDURE — 97112 NEUROMUSCULAR REEDUCATION: CPT | Performed by: PHYSICAL THERAPIST

## 2022-07-18 PROCEDURE — 97140 MANUAL THERAPY 1/> REGIONS: CPT | Performed by: PHYSICAL THERAPIST

## 2022-07-18 PROCEDURE — 97110 THERAPEUTIC EXERCISES: CPT | Performed by: PHYSICAL THERAPIST

## 2022-07-18 NOTE — PROGRESS NOTES
Daily Note     Today's date: 2022  Patient name: Leandra Phan  : 1965  MRN: [id number]  Referring provider: Arielle Shankar*  Dx:   Encounter Diagnosis     ICD-10-CM    1  Radiculopathy, cervical region  M54 12        Start Time: 1115  Stop Time: 1200  Total time in clinic (min): 45 minutes    Subjective: Pt reports she has noticed a significant improvement with symptoms into the right arm  Does get occasional tingling at night, but is not feeling the shooting pain she presented with  Objective: See treatment diary below      Assessment: Tolerated treatment well  Continued with current POC with good tolerance from the patient  Continues to have increased neural tension into the right UE during median nerve glides and active release, abolished with rest  No significant pain reported with exercises  Continue to progress as tolerated  Patient would benefit from continued PT      Plan: Continue per plan of care        Precautions: N/A    Access Code: [email or web address]     Manuals        C/S Distraction & Side Glides   KCB KCB KCB                             Neuro Re-Ed           Median N Glides  10x supine 10x supine 10x supine w/ LTR       Scap Retract   20x3" prone 20x3" prone 20x3" prone       PNF D1/D2           Prone I/T/Y  20x I/T 20x I/T 20x I/T       TB B/L ER   20x RTB 20x RTB       Active Release w/ Side Bend & Retract   10x 2-3" 10x 3"                  Education  S/S, POC, HEP          Ther Ex           Active Warm Up   UBE Retro 4' UBE Retro 5' UBE Retro 5'       TB Row w/ ER  20x RTB 20x RTB 20x RTB       TB Ext w/ Rot   20x RTB 20x RTB 20x RTB       Thoracic Ext   10x5" 10x5" 15x 5"       S/L Thoracic Rot  10x5" 10x5" 15x 5"       LTR w/ Arms Behind Head  10x5"                    Rep RET EXT HEP  2x10        Ther Activity                                 Gait Training                                 Modalities

## 2022-07-22 ENCOUNTER — OFFICE VISIT (OUTPATIENT)
Dept: PHYSICAL THERAPY | Facility: CLINIC | Age: 57
End: 2022-07-22
Payer: COMMERCIAL

## 2022-07-22 DIAGNOSIS — M54.12 RADICULOPATHY, CERVICAL REGION: Primary | ICD-10-CM

## 2022-07-22 PROCEDURE — 97140 MANUAL THERAPY 1/> REGIONS: CPT | Performed by: PHYSICAL THERAPIST

## 2022-07-22 PROCEDURE — 97112 NEUROMUSCULAR REEDUCATION: CPT | Performed by: PHYSICAL THERAPIST

## 2022-07-22 PROCEDURE — 97110 THERAPEUTIC EXERCISES: CPT | Performed by: PHYSICAL THERAPIST

## 2022-07-22 NOTE — PROGRESS NOTES
Daily Note     Today's date: 2022  Patient name: Jesus Gardner  : 1965  MRN: [id number]  Referring provider: Tiffanie Mckenna  Dx:   Encounter Diagnosis     ICD-10-CM    1  Radiculopathy, cervical region  M54 12        Start Time:   Stop Time: 75  Total time in clinic (min): 44 minutes    Subjective: Pt reports she only very rarely has tingling into the fingertips on the right UE  Pain now more localized to the right lower cervical and upper thoracic region  Objective: See treatment diary below      Assessment: Tolerated treatment well  Modified POC to address the patient's current concerns  Improved mobility and decreased symptoms reported with repeated side bend and rotation  Added strengthening into new mobility with good tolerance from the patient  Continue to progress as tolerated  Patient would benefit from continued PT      Plan: Continue per plan of care        Precautions: N/A    Access Code: [email or web address]     Manuals       C/S Distraction & Side Glides   KCB KCB KCB       Seated C/S R SB Mob      KCB                  Neuro Re-Ed           Median N Glides  10x supine 10x supine 10x supine w/ LTR       Scap Retract   20x3" prone 20x3" prone 20x3" prone       PNF D1/D2     D2 Flex/Ext   2x10 YTB      Prone I/T/Y  20x I/T 20x I/T 20x I/T       TB B/L ER   20x RTB 20x RTB       Active Release w/ Side Bend & Retract   10x 2-3" 10x 3" 10x w/ Blk TB                 Education  S/S, POC, HEP    Review HEP  foto       Ther Ex           Active Warm Up   UBE Retro 4' UBE Retro 5' UBE Retro 5' UBE Retro 5'      TB Row w/ ER  20x RTB 20x RTB 20x RTB 20x RTB      TB Ext w/ Rot   20x RTB 20x RTB 20x RTB 20x RTB      Thoracic Ext   10x5" 10x5" 15x 5" 10x seated      S/L Thoracic Rot  10x5" 10x5" 15x 5"       LTR w/ Arms Behind Head  10x5"         Posterior Capsule Stretch      5x 10"                  C/S Retract R Rot      10x       C/S Retract R SB     10x       Rep RET EXT HEP  2x10  10x   10x w/ wiggle       Ther Activity                                 Gait Training                                 Modalities

## 2022-07-25 ENCOUNTER — OFFICE VISIT (OUTPATIENT)
Dept: PHYSICAL THERAPY | Facility: CLINIC | Age: 57
End: 2022-07-25
Payer: COMMERCIAL

## 2022-07-25 DIAGNOSIS — M54.12 RADICULOPATHY, CERVICAL REGION: Primary | ICD-10-CM

## 2022-07-25 PROCEDURE — 97112 NEUROMUSCULAR REEDUCATION: CPT | Performed by: PHYSICAL THERAPIST

## 2022-07-25 PROCEDURE — 97140 MANUAL THERAPY 1/> REGIONS: CPT | Performed by: PHYSICAL THERAPIST

## 2022-07-25 PROCEDURE — 97110 THERAPEUTIC EXERCISES: CPT | Performed by: PHYSICAL THERAPIST

## 2022-07-25 NOTE — PROGRESS NOTES
Daily Note     Today's date: 2022  Patient name: Burns Severin  : 1965  MRN: [id number]  Referring provider: Elisha Ovalle*  Dx:   Encounter Diagnosis     ICD-10-CM    1  Radiculopathy, cervical region  M54 12        Start Time: 517  Stop Time: 9200  Total time in clinic (min): 40 minutes    Subjective: Pt reports she has some soreness in the cervical region at the start of treatment  Objective: See treatment diary below      Assessment: Tolerated treatment well  Continued with current POC with good tolerance from the patient  No pain reported with exercises  Improvement in mobility and decrease in symptoms reported with retraction extension mobilization  Advised to continue with HEP  Progress as tolerated  Patient would benefit from continued PT      Plan: Continue per plan of care        Precautions: N/A    Access Code: [email or web address]     Manuals      C/S Distraction & Side Glides   KCB KCB KCB  KCB     Seated C/S R SB Mob      KCB       Supine Ret Ext Mobilization       KCB                Neuro Re-Ed           Median N Glides  10x supine 10x supine 10x supine w/ LTR       Scap Retract   20x3" prone 20x3" prone 20x3" prone       PNF D1/D2     D2 Flex/Ext   2x10 YTB      Prone I/T/Y  20x I/T 20x I/T 20x I/T       TB B/L ER   20x RTB 20x RTB       Active Release w/ Side Bend & Retract   10x 2-3" 10x 3" 10x w/ Blk TB      Supine C/S Retract      2x10      C/S Retract w/ DNF Lift     10x                 Education  S/S, POC, HEP    Review HEP  foto       Ther Ex           Active Warm Up   UBE Retro 4' UBE Retro 5' UBE Retro 5' UBE Retro 5' UBE Retro 5'     TB Row w/ ER  20x RTB 20x RTB 20x RTB 20x RTB 20x RTB     TB Ext w/ Rot   20x RTB 20x RTB 20x RTB 20x RTB 20x RTB     Thoracic Ext   10x5" 10x5" 15x 5" 10x seated 10x seated     S/L Thoracic Rot  10x5" 10x5" 15x 5"       LTR w/ Arms Behind Head  10x5"         Posterior Capsule Stretch      5x 10"  5x 10" C/S Retract R Rot      10x       C/S Retract R SB     10x       Rep RET EXT HEP  2x10  10x   10x w/ wiggle  10x      Ther Activity                                 Gait Training                                 Modalities

## 2022-07-26 DIAGNOSIS — F41.9 ANXIETY: ICD-10-CM

## 2022-07-26 RX ORDER — ALPRAZOLAM 0.5 MG/1
0.5 TABLET ORAL
Qty: 30 TABLET | Refills: 1 | Status: SHIPPED | OUTPATIENT
Start: 2022-07-26 | End: 2022-09-23 | Stop reason: SDUPTHER

## 2022-07-28 ENCOUNTER — OFFICE VISIT (OUTPATIENT)
Dept: PHYSICAL THERAPY | Facility: CLINIC | Age: 57
End: 2022-07-28
Payer: COMMERCIAL

## 2022-07-28 DIAGNOSIS — M54.12 RADICULOPATHY, CERVICAL REGION: Primary | ICD-10-CM

## 2022-07-28 PROCEDURE — 97140 MANUAL THERAPY 1/> REGIONS: CPT | Performed by: PHYSICAL THERAPIST

## 2022-07-28 PROCEDURE — 97110 THERAPEUTIC EXERCISES: CPT

## 2022-07-28 PROCEDURE — 97112 NEUROMUSCULAR REEDUCATION: CPT

## 2022-07-28 NOTE — PROGRESS NOTES
Daily Note     Today's date: 2022  Patient name: Hair Smith  : 1965  MRN: [id number]  Referring provider: Naomi Castro*  Dx:   Encounter Diagnosis     ICD-10-CM    1  Radiculopathy, cervical region  M54 12                   Subjective: Patient reports overall significant improvement  Objective: See treatment diary below      Assessment: Tolerated treatment well  Patient would benefit from continued PT      Plan: Continue per plan of care        Precautions: N/A    Access Code: [email or web address]     Manuals     C/S Distraction & Side Glides   KCB KCB KCB  KCB SB    Seated C/S R SB Mob      KCB       Supine Ret Ext Mobilization       KCB SB               Neuro Re-Ed           Median N Glides  10x supine 10x supine 10x supine w/ LTR       Scap Retract   20x3" prone 20x3" prone 20x3" prone       PNF D1/D2     D2 Flex/Ext   2x10 YTB      Prone I/T/Y  20x I/T 20x I/T 20x I/T       TB B/L ER   20x RTB 20x RTB       Active Release w/ Side Bend & Retract   10x 2-3" 10x 3" 10x w/ Blk TB      Supine C/S Retract      2x10 2x10     C/S Retract w/ DNF Lift     10x 10x                Education  S/S, POC, HEP    Review HEP  foto       Ther Ex           Active Warm Up   UBE Retro 4' UBE Retro 5' UBE Retro 5' UBE Retro 5' UBE Retro 5' UBE retro 5'    TB Row w/ ER  20x RTB 20x RTB 20x RTB 20x RTB 20x RTB 20x RTB    TB Ext w/ Rot   20x RTB 20x RTB 20x RTB 20x RTB 20x RTB 20x RTB    Thoracic Ext   10x5" 10x5" 15x 5" 10x seated 10x seated 10x seated    S/L Thoracic Rot  10x5" 10x5" 15x 5"       LTR w/ Arms Behind Head  10x5"         Posterior Capsule Stretch      5x 10"  5x 10"                 C/S Retract R Rot      10x   10x    C/S Retract R SB     10x   10x    Rep RET EXT HEP  2x10  10x   10x w/ wiggle  10x  10x  10x w/ wiggle    Ther Activity                                 Gait Training                                 Modalities

## 2022-08-09 ENCOUNTER — CONSULT (OUTPATIENT)
Dept: PAIN MEDICINE | Facility: CLINIC | Age: 57
End: 2022-08-09
Payer: COMMERCIAL

## 2022-08-09 VITALS
BODY MASS INDEX: 26.06 KG/M2 | SYSTOLIC BLOOD PRESSURE: 144 MMHG | DIASTOLIC BLOOD PRESSURE: 104 MMHG | WEIGHT: 141.6 LBS | HEART RATE: 84 BPM | HEIGHT: 62 IN

## 2022-08-09 DIAGNOSIS — M54.12 RADICULOPATHY, CERVICAL REGION: Primary | ICD-10-CM

## 2022-08-09 DIAGNOSIS — M54.2 NECK PAIN: ICD-10-CM

## 2022-08-09 PROCEDURE — 99204 OFFICE O/P NEW MOD 45 MIN: CPT | Performed by: PHYSICAL MEDICINE & REHABILITATION

## 2022-08-09 NOTE — PROGRESS NOTES
Assessment  1  Radiculopathy, cervical region    2  Neck pain        Plan  Ms Janice Izquierdo is a pleasant 61-year-old female who presents for initial evaluation regarding greater than 7 years duration of neck pain with intermittent radiating symptoms into the right upper extremity  She is been referred by Ortho regarding suspected cervical radiculopathy  During today's evaluation she is demonstrating both clinical and diagnostic evidence of cervical radiculopathy likely in relation to the multilevel moderate to severe degenerative disc disease most notable at C4-C5, C5-C6 with varying degrees of neural foraminal stenosis all of which is likely impacting her radicular pain  At this time interventional approaches would be beneficial and warranted  As such we will   1  Plan for cervical epidural steroid injection under fluoro guidance   2  Advised patient to continue with conservative measures including physical therapy and home exercises   3  Discussed membrane stabilizing agents but for now we will focus on conservative and interventional approaches   4  Complete risks and benefits including bleeding, infection, tissue reaction, nerve injury and allergic reaction were discussed  The approach was demonstrated using models and literature was provided  Verbal and written consent was obtained  My impressions and treatment recommendations were discussed in detail with the patient who verbalized understanding and had no further questions  Discharge instructions were provided  I personally saw and examined the patient and I agree with the above discussed plan of care  Orders Placed This Encounter   Procedures    FL spine and pain procedure     Standing Status:   Future     Standing Expiration Date:   8/9/2026     Order Specific Question:   Reason for Exam:     Answer:   ARIANA     Order Specific Question:   Is the patient pregnant? Answer:   No     Order Specific Question:   Anticoagulant hold needed? Answer:    No No orders of the defined types were placed in this encounter  History of Present Illness    Lawson Chamberlain is a 56 y o  female presents to [place] [address] [facility] for initial evaluation regarding 7 years duration of neck pain with intermittent radiating symptoms into bilateral shoulders as well as bilateral hands  Patient denies any significant inciting event or recent trauma and has been referred by Dr Matthew Gonzales  Today reports moderate to severe pain rated 7 8/10 and interfering daily activities  Pain is nearly constant 60-95% of the time that is worse in the morning  Describes symptoms as burning, shooting, numbness, dull aching, pins and needles  Also reports dropping objects but denies any significant weakness  Does not use any durable medical equipment for ambulation  Symptoms are worse with lying down, standing, sneezing  Has had moderate relief with physical therapy and exercise as well as heat  Currently using Xanax and previously tried over-the-counter Tylenol and Motrin with minimal relief  Presents for initial evaluation  I have personally reviewed and/or updated the patient's past medical history, past surgical history, family history, social history, current medications, allergies, and vital signs today  Review of Systems   Musculoskeletal: Positive for neck pain and neck stiffness  Neurological: Positive for weakness and numbness         Patient Active Problem List   Diagnosis    Allergic rhinitis    Anxiety disorder    Bloating    Bulge of cervical disc without myelopathy    Cervical paraspinal muscle spasm    Neck pain    Esophageal reflux    Herniated cervical disc    Lower back pain    Other fatigue    Seasonal allergies    Vaginal dryness    Weight gain    Encounter for gynecological examination (general) (routine) without abnormal findings    Routine gynecological examination    Gynecologic exam normal       Past Medical History: Diagnosis Date    Anxiety     Arthritis     Asthma     Cancer ([facility] [address] )     Skin    basal cell carcinoma    Fibroid     20 plus years ago   one in each breast    GERD (gastroesophageal reflux disease)     Irritable bowel syndrome     Migraine     Occasional    PONV (postoperative nausea and vomiting)     Skin cancer     Urinary tract infection     Occasionally       Past Surgical History:   Procedure Laterality Date    BREAST BIOPSY      20 plus years ago   fibroids    BREAST CYST ASPIRATION Bilateral     BREAST SURGERY Bilateral     cyst removal    COLONOSCOPY      COLPOSCOPY  2019    AL ESOPHAGOGASTRODUODENOSCOPY TRANSORAL DIAGNOSTIC N/A 1/2/2018    Procedure: EGD AND COLONOSCOPY;  Surgeon: Ly Castro MD;  [facility]; Service: Gastroenterology    TONSILLECTOMY      WISDOM TOOTH EXTRACTION         Family History   Problem Relation Age of Onset   [facility] Breast cancer Mother 72        dx in 57's    Other Mother         stenosis    Osteoporosis Mother     Scleroderma Father     Breast cancer Maternal Grandmother         dx in [de-identified] Other Family         back disorder    No Known Problems Daughter     Lung cancer Maternal Aunt     No Known Problems Maternal Grandfather     No Known Problems Paternal Grandmother     No Known Problems Paternal Grandfather     No Known Problems Son     No Known Problems Maternal Aunt        Social History     Occupational History    Not on file   Tobacco Use    Smoking status: Never Smoker    Smokeless tobacco: Never Used   Substance and Sexual Activity    Alcohol use:  Yes     Alcohol/week: 10 0 standard drinks     Types: 10 Glasses of wine per week     Comment: daily    Drug use: No    Sexual activity: Yes     Partners: Male       Current Outpatient Medications on File Prior to Visit   Medication Sig    ALPRAZolam (XANAX) 0 5 mg tablet Take 1 tablet (0 5 mg total) by mouth daily at bedtime as needed for anxiety    cetirizine (ZyrTEC) 10 mg tablet Take by mouth    Cholecalciferol (VITAMIN D3) 2000 units TABS Take 1 tablet by mouth daily    citalopram (CeleXA) 40 mg tablet Take 1 tablet (40 mg total) by mouth daily    pantoprazole (PROTONIX) 40 mg tablet TAKE 1 TABLET BY MOUTH TWO TIMES DAILY    Sodium Fluoride 5000 PPM 1 1 % PSTE USE AS DIRECTED EVERY DAY AT BEDTIME    Naproxen Sodium 220 MG CAPS Take by mouth (Patient not taking: Reported on 8/9/2022)     No current facility-administered medications on file prior to visit  No Known Allergies    Physical Exam    BP (!) 144/104   Pulse 84   Ht 5' 2" (1 575 m)   Wt 64 2 kg (141 lb 9 6 oz)   BMI 25 90 kg/m²     Constitutional: normal, well developed, well nourished, alert, in no distress and non-toxic and no overt pain behavior    Eyes: anicteric  HEENT: grossly intact  Neck: supple, symmetric, trachea midline and no masses   Pulmonary:even and unlabored  Cardiovascular:No edema or pitting edema present  Skin:Normal without rashes or lesions and well hydrated  Psychiatric:Mood and affect appropriate  Neurologic:Cranial Nerves II-XII grossly intact  Musculoskeletal:normal gait, tenderness to palpation right-sided cervical paraspinals, decreased active and passive range of motion with cervical flexion, extension, right side bending and rotation limited by pain, MMT 5/5 bilateral upper extremities, sensation decreased to light touch in patchy distribution right upper extremity, positive Spurling with radicular symptoms into the right arm    Imaging

## 2022-08-16 ENCOUNTER — EVALUATION (OUTPATIENT)
Dept: PHYSICAL THERAPY | Facility: CLINIC | Age: 57
End: 2022-08-16
Payer: COMMERCIAL

## 2022-08-16 DIAGNOSIS — M54.12 RADICULOPATHY, CERVICAL REGION: Primary | ICD-10-CM

## 2022-08-16 PROCEDURE — 97140 MANUAL THERAPY 1/> REGIONS: CPT | Performed by: PHYSICAL THERAPIST

## 2022-08-16 PROCEDURE — 97112 NEUROMUSCULAR REEDUCATION: CPT | Performed by: PHYSICAL THERAPIST

## 2022-08-16 NOTE — PROGRESS NOTES
Progress Note     Today's date: 2022  Patient name: Odalys Rebolledo  : 1965  MRN: [id number]  Referring provider: Shirley Milton*  Dx:   Encounter Diagnosis     ICD-10-CM    1  Radiculopathy, cervical region  M54 12        Start Time: 1110  Stop Time: 1200  Total time in clinic (min): 50 minutes    Subjective: The patient presents for re-evaluation today  The patient reports improvement in symptoms since beginning skilled physical therapy  The patient reports 3/10 pain at it's worst over the past 24 hours, and reports 75-80% improvement in overall condition since beginning formal PT care  Pt reports significant improvement with radicular symptoms into B/L UEs  Reports very occasional symptoms into the hands that does not linger  ROM improved t/o the cervical region  Reports improved sleep quality since [facility], mostly related to decrease in radicular symptoms  Pt had visit with pain management and is scheduled for CRISTIANA on   The patient's chief remaining concern is pain in the upper back and scapular region  Objective:     Tenderness   TTP in the right UT and LS     Active Range of Motion     Cervical  Subcranial protraction:  WFL   Subcranial retraction:   WFL  Flexion:  Restriction level: minimal  Extension:  Restriction level: WFL  Left lateral flexion:  WFL  Right lateral flexion:  with pain Restriction level moderate  Left rotation:  WFL  Right rotation:  with pain Restriction level: minimal        Joint Play     Hypomobile: T1-T7    Mechanical Assessment    Cervical    Seated Retraction Extension: repeated movements  Pain location: centralized  Pain intensity: better  Pain level: decreased      Tests   Cervical   Negative vertical compression and cervical distraction  Left   Negative Spurling's Test A  Right   Negative Spurling's Test A  Left Shoulder   Negative ULTT1  Right Shoulder   Positive ULTT1         Assessment: Odalys Rebolledo is a pleasant 56 y o  female who has been receiving PT intervention for cervical radiculopathy  The patient has demonstrated decreased pain, increased strength, increased ROM, increased joint mobility, improved posture  and increased activity tolerance since beginning treatment  Primary remaining impairments include:    1)  Decreased cervical lateral flexion and rotation     2)  Impaired postural awareness and control      Goals  Short Term Goals 2-4 wks   1  Pt will be independent with initial HEP -MET  2  Pt will decrease pain levels to < 5/10 at worst -MET  3  Pt will improve cervical ROM to WNL and pain free -Partially MET    Long Term Goals 6-8 wks  1  Pt will improve deficient mm strength to 5/5 t/o -MET  2  Pt will report no radicular symptoms into B/L UEs  -Mostly MET  3  Pt will improve FOTO score to >56 by d/c- MET    Plan:   Patient would benefit from: skilled physical therapy  Referral necessary: No  Planned modality interventions: Modalities PRN    Planned therapy interventions: activity modification, manual therapy, neuromuscular re-education, patient education, therapeutic activities, therapeutic exercise, graded activity, home exercise program, behavior modification, self care, joint mobilization and postural training  Frequency: 1-2 week  Duration in weeks: 4  Treatment plan discussed with: patient     Precautions: N/A    Access Code: [email or web address]     Manuals 7/5 7/8 7/12 7/18 7/22 7/25 7/28 8/16   C/S Distraction & Side Glides   KCB KCB KCB  KCB SB    Seated C/S R SB Mob      KCB       Supine Ret Ext Mobilization       KCB SB    R 1st/2nd Rib Mob Gr1-4        KCB   Seated CTJ Gr5 Mob        KCB              Neuro Re-Ed           Median N Glides  10x supine 10x supine 10x supine w/ LTR       Scap Retract   20x3" prone 20x3" prone 20x3" prone       PNF D1/D2     D2 Flex/Ext   2x10 YTB      Prone I/T/Y  20x I/T 20x I/T 20x I/T       TB B/L ER   20x RTB 20x RTB       Active Release w/ Side Bend & Retract   10x 2-3" 10x 3" 10x w/ Blk TB Supine C/S Retract      2x10 2x10     C/S Retract w/ DNF Lift     10x 10x                Education  S/S, POC, HEP    Review HEP  foto    R/A, HEP    Ther Ex           Active Warm Up   UBE Retro 4' UBE Retro 5' UBE Retro 5' UBE Retro 5' UBE Retro 5' UBE retro 5' UBE retro 5'   TB Row w/ ER  20x RTB 20x RTB 20x RTB 20x RTB 20x RTB 20x RTB    TB Ext w/ Rot   20x RTB 20x RTB 20x RTB 20x RTB 20x RTB 20x RTB    Thoracic Ext   10x5" 10x5" 15x 5" 10x seated 10x seated 10x seated    S/L Thoracic Rot  10x5" 10x5" 15x 5"       LTR w/ Arms Behind Head  10x5"         Posterior Capsule Stretch      5x 10"  5x 10"                 C/S Retract R Rot      10x   10x    C/S Retract R SB     10x   10x    Rep RET EXT HEP  2x10  10x   10x w/ wiggle  10x  10x  10x w/ wiggle    Ther Activity                                 Gait Training                                 Modalities

## 2022-08-19 ENCOUNTER — OFFICE VISIT (OUTPATIENT)
Dept: PHYSICAL THERAPY | Facility: CLINIC | Age: 57
End: 2022-08-19
Payer: COMMERCIAL

## 2022-08-19 DIAGNOSIS — M54.12 RADICULOPATHY, CERVICAL REGION: Primary | ICD-10-CM

## 2022-08-19 PROCEDURE — 97140 MANUAL THERAPY 1/> REGIONS: CPT | Performed by: PHYSICAL THERAPIST

## 2022-08-19 PROCEDURE — 97112 NEUROMUSCULAR REEDUCATION: CPT | Performed by: PHYSICAL THERAPIST

## 2022-08-19 PROCEDURE — 97110 THERAPEUTIC EXERCISES: CPT | Performed by: PHYSICAL THERAPIST

## 2022-08-19 NOTE — PROGRESS NOTES
Daily Note     Today's date: 2022  Patient name: Hazeline Klinefelter  : 1965  MRN: [id number]  Referring provider: Dion Covarrubias*  Dx:   Encounter Diagnosis     ICD-10-CM    1  Radiculopathy, cervical region  M54 12        Start Time: 1100  Stop Time: 7250  Total time in clinic (min): 45 minutes    Subjective: Pt reports she had improvement in symptoms around the scapular region with manual techniques added to POC at last visit  No new concerns noted at this time  Objective: See treatment diary below      Assessment: Tolerated treatment well  Patient would benefit from continued PT      Plan: Continue per plan of care        Precautions: N/A    Access Code: [email or web address]     Manuals    C/S Distraction & Side Glides  KCB  KCB KCB  KCB SB    Seated C/S R SB Mob      KCB       Supine Ret Ext Mobilization       KCB SB    R 1st/2nd Rib Mob Gr1-4 KCB       KCB   Seated CTJ Gr5 Mob        KCB              Neuro Re-Ed           Median N Glides   10x supine 10x supine w/ LTR       Scap Retract  20x3" prone   20x3" prone 20x3" prone       PNF D1/D2     D2 Flex/Ext   2x10 YTB      Prone I/T/Y 20x I/T  20x I/T 20x I/T       TB B/L ER   20x RTB 20x RTB       Active Release w/ Side Bend & Retract   10x 2-3" 10x 3" 10x w/ Blk TB      Supine C/S Retract  2x10     2x10 2x10     C/S Retract w/ DNF Lift 10x 5"    10x 10x     CCF 2x10                      Education      Review HEP  foto    R/A, HEP    Ther Ex           Active Warm Up  UBE retro 5'  UBE Retro 5' UBE Retro 5' UBE Retro 5' UBE Retro 5' UBE retro 5' UBE retro 5'   TB Row w/ ER   20x RTB 20x RTB 20x RTB 20x RTB 20x RTB    TB Ext w/ Rot    20x RTB 20x RTB 20x RTB 20x RTB 20x RTB    Thoracic Ext    10x5" 15x 5" 10x seated 10x seated 10x seated    S/L Thoracic Rot   10x5" 15x 5"       LTR w/ Arms Behind Head           Posterior Capsule Stretch      5x 10"  5x 10"      1st Rib Self Mob 10x 5"          Right UT Stretch  10x 10"                     C/S Retract R Rot      10x   10x    C/S Retract R SB     10x   10x    Rep RET EXT   2x10  10x   10x w/ wiggle  10x  10x  10x w/ wiggle    Ther Activity                                 Gait Training                                 Modalities

## 2022-08-26 ENCOUNTER — OFFICE VISIT (OUTPATIENT)
Dept: PHYSICAL THERAPY | Facility: CLINIC | Age: 57
End: 2022-08-26
Payer: COMMERCIAL

## 2022-08-26 DIAGNOSIS — M54.12 RADICULOPATHY, CERVICAL REGION: Primary | ICD-10-CM

## 2022-08-26 PROCEDURE — 97140 MANUAL THERAPY 1/> REGIONS: CPT | Performed by: PHYSICAL THERAPIST

## 2022-08-26 PROCEDURE — 97110 THERAPEUTIC EXERCISES: CPT | Performed by: PHYSICAL THERAPIST

## 2022-08-26 PROCEDURE — 97112 NEUROMUSCULAR REEDUCATION: CPT | Performed by: PHYSICAL THERAPIST

## 2022-08-26 NOTE — PROGRESS NOTES
Daily Note     Today's date: 2022  Patient name: Jessie Thompson  : 1965  MRN: [id number]  Referring provider: Madelyn Dumont*  Dx:   Encounter Diagnosis     ICD-10-CM    1  Radiculopathy, cervical region  M54 12        Start Time: 1400  Stop Time: 1440  Total time in clinic (min): 40 minutes    Subjective: Pt with no new concerns noted at this time  Continues very minimal radicular symptoms, typically just a little into the right hand at night  Objective: See treatment diary below      Assessment: Tolerated treatment well  Continued with current POC with good tolerance from the patient  No pain reported with exercises  Continues to have increased muscular tightness in the cervical region, primarily the lateral aspect  Pt to be seen for one additional visit, and if no change in status, pt will be d/c  Patient would benefit from continued PT      Plan: Continue per plan of care        Precautions: N/A    Access Code: [email or web address]     Manuals    C/S Distraction & Side Glides  KCB KCB  KCB  KCB SB    Seated C/S R SB Mob      KCB       Supine Ret Ext Mobilization       KCB SB    R 1st/2nd Rib Mob Gr1-4 KCB       KCB   Seated CTJ Gr5 Mob        KCB              Neuro Re-Ed           Median N Glides    10x supine w/ LTR       Scap Retract  20x3" prone  20x3" prone  20x3" prone       PNF D1/D2     D2 Flex/Ext   2x10 YTB      Prone I/T/Y 20x I/T 20x I/T  20x I/T       TB B/L ER    20x RTB       Active Release w/ Side Bend & Retract    10x 3" 10x w/ Blk TB      Supine C/S Retract  2x10  2x10   2x10 2x10     C/S Retract w/ DNF Lift 10x 5" 10x 5"   10x 10x     CCF 2x10                      Education      Review HEP  foto    R/A, HEP    Ther Ex           Active Warm Up  UBE retro 5' UBE retro 5'  UBE Retro 5' UBE Retro 5' UBE Retro 5' UBE retro 5' UBE retro 5'   TB Row w/ ER  20x RTB  20x RTB 20x RTB 20x RTB 20x RTB    TB Ext w/ Rot   20x RTB  20x RTB 20x RTB 20x RTB 20x RTB    Thoracic Ext   10x seated  15x 5" 10x seated 10x seated 10x seated    S/L Thoracic Rot    15x 5"       Posterior Capsule Stretch      5x 10"  5x 10"      1st Rib Self Mob 10x 5"          Right UT Stretch  10x 10"                     C/S Retract R Rot      10x   10x    C/S Retract R SB     10x   10x    Rep RET EXT  20x   10x   10x w/ wiggle  10x  10x  10x w/ wiggle    Ther Activity                                 Gait Training                                 Modalities

## 2022-08-30 DIAGNOSIS — N95.1 PERIMENOPAUSAL SYMPTOMS: Primary | ICD-10-CM

## 2022-08-30 RX ORDER — NORETHINDRONE ACETATE AND ETHINYL ESTRADIOL .5; 2.5 MG/1; UG/1
1 TABLET ORAL DAILY
Qty: 90 TABLET | Refills: 1 | Status: SHIPPED | OUTPATIENT
Start: 2022-08-30

## 2022-08-30 NOTE — TELEPHONE ENCOUNTER
Patient called and stated that she was put on norethindrone-ethinyl estradiol (FEMHRT LOW DOSE) 0 5-2 5 MG-MCG per tablet due to menopausal symptoms by [place]  Patient discontinued it because symptoms got better  I went to refill the prescription and it was taken out  Patient wants to have the birthcontrol refilled

## 2022-09-06 ENCOUNTER — OFFICE VISIT (OUTPATIENT)
Dept: PHYSICAL THERAPY | Facility: CLINIC | Age: 57
End: 2022-09-06
Payer: COMMERCIAL

## 2022-09-06 DIAGNOSIS — M54.12 RADICULOPATHY, CERVICAL REGION: Primary | ICD-10-CM

## 2022-09-06 PROCEDURE — 97112 NEUROMUSCULAR REEDUCATION: CPT | Performed by: PHYSICAL THERAPIST

## 2022-09-06 NOTE — PROGRESS NOTES
Daily Note     Today's date: 2022  Patient name: Samantha Mcconnell  : 1965  MRN: [id number]  Referring provider: Alicia Haynes*  Dx:   Encounter Diagnosis     ICD-10-CM    1  Radiculopathy, cervical region  M54 12        Start Time: 1100  Stop Time: 1130  Total time in clinic (min): 30 minutes    Subjective: Pt reports she still has some low level symptoms in the scapular region, R>L  No radicular symptoms reported into the UEs with occasional tingling in her hands at night that relieves with position change  Overall significant improvement noted since [facility]  Pt is scheduled for injection on   Objective: See treatment diary below      Assessment: Reviewed objective measurements in the cervical and thoracic spine with significant improvement noted compared to [facility] with the patient reporting decreased symptoms in all planes  Updated HEP with the patient verbalizing and demonstrating of exercises  Advised the patient to contact clinic if additional PT services are required  Plan: D/C PT and transition to HEP       Precautions: N/A    Access Code: [email or web address]     Manuals    C/S Distraction & Side Glides  KCB KCB    KCB SB    Seated C/S R SB Mob      KCB       Supine Ret Ext Mobilization       KCB SB    R 1st/2nd Rib Mob Gr1-4 KCB       KCB   Seated CTJ Gr5 Mob        KCB              Neuro Re-Ed           Median N Glides           Scap Retract  20x3" prone  20x3" prone         PNF D1/D2     D2 Flex/Ext   2x10 YTB      Prone I/T/Y 20x I/T 20x I/T         TB B/L ER           Active Release w/ Side Bend & Retract     10x w/ Blk TB      Supine C/S Retract  2x10  2x10   2x10 2x10     C/S Retract w/ DNF Lift 10x 5" 10x 5"   10x 10x     CCF 2x10                      Education    Update HEP, foto  Review HEP  foto    R/A, HEP    Ther Ex           Active Warm Up  UBE retro 5' UBE retro 5' UBE retro 5'  UBE Retro 5' UBE Retro 5' UBE retro 5' UBE retro 5'   TB Row w/ ER 20x RTB   20x RTB 20x RTB 20x RTB    TB Ext w/ Rot   20x RTB   20x RTB 20x RTB 20x RTB    Thoracic Ext   10x seated   10x seated 10x seated 10x seated    S/L Thoracic Rot           Posterior Capsule Stretch      5x 10"  5x 10"      1st Rib Self Mob 10x 5"          Right UT Stretch  10x 10"                     C/S Retract R Rot      10x   10x    C/S Retract R SB     10x   10x    Rep RET EXT  20x   10x   10x w/ wiggle  10x  10x  10x w/ wiggle    Ther Activity                                 Gait Training                                 Modalities

## 2022-09-12 ENCOUNTER — HOSPITAL ENCOUNTER (OUTPATIENT)
Dept: RADIOLOGY | Facility: CLINIC | Age: 57
Discharge: HOME | End: 2022-09-12
Payer: COMMERCIAL

## 2022-09-12 VITALS
DIASTOLIC BLOOD PRESSURE: 61 MMHG | SYSTOLIC BLOOD PRESSURE: 117 MMHG | HEART RATE: 73 BPM | OXYGEN SATURATION: 98 % | TEMPERATURE: 98 F | RESPIRATION RATE: 18 BRPM

## 2022-09-12 DIAGNOSIS — M54.12 RADICULOPATHY, CERVICAL REGION: ICD-10-CM

## 2022-09-12 DIAGNOSIS — M54.2 NECK PAIN: ICD-10-CM

## 2022-09-12 PROCEDURE — 62321 NJX INTERLAMINAR CRV/THRC: CPT | Performed by: PHYSICAL MEDICINE & REHABILITATION

## 2022-09-12 RX ORDER — METHYLPREDNISOLONE ACETATE 80 MG/ML
80 INJECTION, SUSPENSION INTRA-ARTICULAR; INTRALESIONAL; INTRAMUSCULAR; PARENTERAL; SOFT TISSUE ONCE
Status: COMPLETED | OUTPATIENT
Start: 2022-09-12 | End: 2022-09-12

## 2022-09-12 RX ADMIN — IOHEXOL 1 ML: 300 INJECTION, SOLUTION INTRAVENOUS at 08:34

## 2022-09-12 RX ADMIN — METHYLPREDNISOLONE ACETATE 80 MG: 80 INJECTION, SUSPENSION INTRA-ARTICULAR; INTRALESIONAL; INTRAMUSCULAR; PARENTERAL; SOFT TISSUE at 08:36

## 2022-09-12 NOTE — H&P
History of Present Illness: The patient is a 56 y o  female who presents with complaints of neck pain    Patient Active Problem List   Diagnosis    Allergic rhinitis    Anxiety disorder    Bloating    Bulge of cervical disc without myelopathy    Cervical paraspinal muscle spasm    Neck pain    Esophageal reflux    Herniated cervical disc    Lower back pain    Other fatigue    Seasonal allergies    Vaginal dryness    Weight gain    Encounter for gynecological examination (general) (routine) without abnormal findings    Routine gynecological examination    Gynecologic exam normal       Past Medical History:   Diagnosis Date    Anxiety     Arthritis     Asthma     Cancer ([facility] [address] )     Skin    basal cell carcinoma    Fibroid     20 plus years ago   one in each breast    GERD (gastroesophageal reflux disease)     Irritable bowel syndrome     Migraine     Occasional    PONV (postoperative nausea and vomiting)     Skin cancer     Urinary tract infection     Occasionally       Past Surgical History:   Procedure Laterality Date    BREAST BIOPSY      20 plus years ago   fibroids    BREAST CYST ASPIRATION Bilateral     BREAST SURGERY Bilateral     cyst removal    COLONOSCOPY      COLPOSCOPY  2019    [place] ESOPHAGOGASTRODUODENOSCOPY TRANSORAL DIAGNOSTIC N/A 1/2/2018    Procedure: EGD AND COLONOSCOPY;  Surgeon: Ary Bob MD;  [facility];   Service: Gastroenterology    TONSILLECTOMY      WISDOM TOOTH EXTRACTION           Current Outpatient Medications:     ALPRAZolam (XANAX) 0 5 mg tablet, Take 1 tablet (0 5 mg total) by mouth daily at bedtime as needed for anxiety, Disp: 30 tablet, Rfl: 1    cetirizine (ZyrTEC) 10 mg tablet, Take by mouth, Disp: , Rfl:     Cholecalciferol (VITAMIN D3) 2000 units TABS, Take 1 tablet by mouth daily, Disp: , Rfl:     citalopram (CeleXA) 40 mg tablet, Take 1 tablet (40 mg total) by mouth daily, Disp: 90 tablet, Rfl: 3    Naproxen Sodium 220 MG CAPS, Take by mouth (Patient not taking: Reported on 8/9/2022), Disp: , Rfl:     norethindrone-ethinyl estradiol (FEMHRT LOW DOSE) 0 5-2 5 MG-MCG per tablet, Take 1 tablet by mouth daily, Disp: 90 tablet, Rfl: 1    pantoprazole (PROTONIX) 40 mg tablet, TAKE 1 TABLET BY MOUTH TWO TIMES DAILY, Disp: 60 tablet, Rfl: 3    Sodium Fluoride 5000 PPM 1 1 % PSTE, USE AS DIRECTED EVERY DAY AT BEDTIME, Disp: , Rfl:     Current Facility-Administered Medications:     iohexol (OMNIPAQUE) 300 mg/mL injection 50 mL, 50 mL, Epidural, Once, Edy Sharpe,     methylPREDNISolone acetate (DEPO-MEDROL) injection 80 mg, 80 mg, Epidural, Once, Edy Sharpe,     No Known Allergies    Physical Exam:   Vitals:    09/12/22 0820   BP: 134/86   Pulse: 100   Resp: 18   Temp: 98 °F (36 7 °C)   SpO2: 97%     General: Awake, Alert, Oriented x 3, Mood and affect appropriate  Respiratory: Respirations even and unlabored  Cardiovascular: Peripheral pulses intact; no edema  Musculoskeletal Exam: Tenderness to palpation bilateral cervical paraspinals    ASA Score: 2    Patient/Chart Verification  Patient ID Verified: Verbal  ID Band Applied: No  Consents Confirmed: Procedural, To be obtained in the Pre-Procedure area  H&P( within 30 days) Verified: To be obtained in the Pre-Procedure area  Allergies Reviewed: Yes  Anticoag/NSAID held?: No (denies nsaid use)  Currently on antibiotics?: No    Assessment:   1  Radiculopathy, cervical region    2   Neck pain        Plan: ARIANA

## 2022-09-12 NOTE — DISCHARGE INSTR - LAB
Epidural Steroid Injection   WHAT YOU NEED TO KNOW:   An epidural steroid injection (CRISTIANA) is a procedure to inject steroid medicine into the epidural space  The epidural space is between your spinal cord and vertebrae  Steroids reduce inflammation and fluid buildup in your spine that may be causing pain  You may be given pain medicine along with the steroids  ACTIVITY  Do not drive or operate machinery today  No strenuous activity today - bending, lifting, etc   You may resume normal activites starting tomorrow - start slowly and as tolerated  You may shower today, but no tub baths or hot tubs  You may have numbness for several hours from the local anesthetic  Please use caution and common sense, especially with weight-bearing activities  CARE OF THE INJECTION SITE  If you have soreness or pain, apply ice to the area today (20 minutes on/20 minutes off)  Starting tomorrow, you may use warm, moist heat or ice if needed  You may have an increase or change in your discomfort for 36-48 hours after your treatment  Apply ice and continue with any pain medication you have been prescribed  Notify the [facility] if you have any of the following: redness, drainage, swelling, headache, stiff neck or fever above 100°F     SPECIAL INSTRUCTIONS  Our office will contact you in approximately 7 days for a progress report  MEDICATIONS  Continue to take all routine medications  Our office may have instructed you to hold some medications  As no general anesthesia was used in today's procedure, you should not experience any side effects related to anesthesia  If you are diabetic, the steroids used in today's injection may temporarily increase your blood sugar levels after the first few days after your injection  Please keep a close eye on your sugars and alert the doctor who manages your diabetes if your sugars are significantly high from your baseline or you are symptomatic       If you have a problem specifically related to your procedure, please call our office at [phone]  Problems not related to your procedure should be directed to your primary care physician

## 2022-09-19 ENCOUNTER — TELEPHONE (OUTPATIENT)
Dept: PAIN MEDICINE | Facility: CLINIC | Age: 57
End: 2022-09-19

## 2022-09-23 DIAGNOSIS — F41.9 ANXIETY: ICD-10-CM

## 2022-09-23 RX ORDER — ALPRAZOLAM 0.5 MG/1
0.5 TABLET ORAL
Qty: 30 TABLET | Refills: 1 | Status: SHIPPED | OUTPATIENT
Start: 2022-09-23

## 2022-09-23 NOTE — TELEPHONE ENCOUNTER
1 [id number] 08/24/2022 07/26/2022 ALPRAZolam (Tablet) 30 0 30 0 5 MG NA MELISSA PARRA [address]   Reed 'ROBERTA' Us 1 / 1 PA

## 2022-09-26 NOTE — TELEPHONE ENCOUNTER
ISRAEL  S/w pt, states the 1st week she had 100% relief but then she's doing a lot of lifting with her dog and notices every morning after waking up, although she is wearing a cervical pillow, her pain comes back and worst in the morning  Pt says she takes advil and relieves her pain, otherwise she also use ice/heat which is helping  Pt says she doesn't really want any other stronger pain medicine  The pain is anywhere around her neck

## 2022-09-26 NOTE — TELEPHONE ENCOUNTER
Pt said that she felt better the first week  Is it normal to still feel some discomfort? It is better then it was overall  Pain level is a 4-5/10  The pain comes and goes, it is morning   Please follow up

## 2022-10-02 DIAGNOSIS — K21.9 GASTROESOPHAGEAL REFLUX DISEASE WITHOUT ESOPHAGITIS: ICD-10-CM

## 2022-10-03 RX ORDER — PANTOPRAZOLE SODIUM 40 MG/1
TABLET, DELAYED RELEASE ORAL
Qty: 60 TABLET | Refills: 3 | Status: SHIPPED | OUTPATIENT
Start: 2022-10-03

## 2022-10-12 PROBLEM — Z01.419 GYNECOLOGIC EXAM NORMAL: Status: RESOLVED | Noted: 2021-03-03 | Resolved: 2022-10-12

## 2022-10-20 ENCOUNTER — RA CDI HCC (OUTPATIENT)
Dept: OTHER | Facility: HOSPITAL | Age: 57
End: 2022-10-20

## 2022-10-20 NOTE — PROGRESS NOTES
[facility]  coding opportunities       Chart reviewed, no opportunity found: CHART REVIEWED, NO OPPORTUNITY FOUND        Patients Insurance  [employer]     Medicare Insurance: [facility]

## 2022-11-04 ENCOUNTER — OFFICE VISIT (OUTPATIENT)
Dept: FAMILY MEDICINE CLINIC | Facility: CLINIC | Age: 57
End: 2022-11-04

## 2022-11-04 VITALS
BODY MASS INDEX: 25.06 KG/M2 | SYSTOLIC BLOOD PRESSURE: 120 MMHG | WEIGHT: 136.2 LBS | HEIGHT: 62 IN | DIASTOLIC BLOOD PRESSURE: 72 MMHG | TEMPERATURE: 98.1 F | HEART RATE: 89 BPM | OXYGEN SATURATION: 98 %

## 2022-11-04 DIAGNOSIS — F41.9 ANXIETY: ICD-10-CM

## 2022-11-04 DIAGNOSIS — Z23 NEED FOR VACCINATION: ICD-10-CM

## 2022-11-04 DIAGNOSIS — Z00.00 ANNUAL PHYSICAL EXAM: Primary | ICD-10-CM

## 2022-11-04 RX ORDER — CLONAZEPAM 0.5 MG/1
0.5 TABLET ORAL
Qty: 30 TABLET | Refills: 3 | Status: SHIPPED | OUTPATIENT
Start: 2022-11-04

## 2022-11-04 NOTE — PATIENT INSTRUCTIONS

## 2022-11-04 NOTE — PROGRESS NOTES
ADULT ANNUAL [address]    NAME: Randy Bailey  AGE: 56 y o  SEX: female  : 1965     DATE: 2022     Assessment and Plan:     Problem List Items Addressed This Visit    None         Immunizations and preventive care screenings were discussed with patient today  Appropriate education was printed on patient's after visit summary  Counseling:  · Exercise: the importance of regular exercise/physical activity was discussed  Recommend exercise 3-5 times per week for at least 30 minutes  No follow-ups on file  Chief Complaint:     Chief Complaint   Patient presents with   • Physical Exam      History of Present Illness:     Adult Annual Physical   Patient here for a comprehensive physical exam  The patient reports no problems  Diet and Physical Activity  · Diet/Nutrition: well balanced diet  · Exercise: walking  Depression Screening  PHQ-2/9 Depression Screening    Little interest or pleasure in doing things: 0 - not at all  Feeling down, depressed, or hopeless: 0 - not at all  PHQ-2 Score: 0  PHQ-2 Interpretation: Negative depression screen       General Health  · Sleep: sleeps well  · Hearing: normal - bilateral   · Vision: no vision problems  · Dental: regular dental visits  /GYN Health  · Patient is: postmenopausal  · Last menstrual period:   · Contraceptive method:       Review of Systems:     Review of Systems   Constitutional: Negative for appetite change, chills and fever  HENT: Negative for ear pain, facial swelling, rhinorrhea, sinus pain, sore throat and trouble swallowing  Eyes: Negative for discharge and redness  Respiratory: Negative for chest tightness, shortness of breath and wheezing  Cardiovascular: Negative for chest pain and palpitations  Gastrointestinal: Negative for abdominal pain, diarrhea, nausea and vomiting  Endocrine: Negative for polyuria     Genitourinary: Negative for dysuria and urgency  Musculoskeletal: Negative for arthralgias and back pain  Skin: Negative for rash  Neurological: Negative for dizziness, weakness and headaches  Hematological: Negative for adenopathy  Psychiatric/Behavioral: Negative for behavioral problems, confusion and sleep disturbance  All other systems reviewed and are negative  Past Medical History:     Past Medical History:   Diagnosis Date   • Anxiety    • Arthritis    • Asthma    • Cancer ([facility] [address] )     Skin    basal cell carcinoma   • Fibroid     20 plus years ago   one in each breast   • GERD (gastroesophageal reflux disease)    • Irritable bowel syndrome    • Migraine     Occasional   • PONV (postoperative nausea and vomiting)    • Skin cancer    • Urinary tract infection     Occasionally      Past Surgical History:     Past Surgical History:   Procedure Laterality Date   • BREAST BIOPSY      20 plus years ago   fibroids   • BREAST CYST ASPIRATION Bilateral    • BREAST SURGERY Bilateral     cyst removal   • COLONOSCOPY     • COLPOSCOPY  2019   • [place] ESOPHAGOGASTRODUODENOSCOPY TRANSORAL DIAGNOSTIC N/A 1/2/2018    Procedure: EGD AND COLONOSCOPY;  Surgeon: Roslyn Raza MD;  Location: [facility]; Service: Gastroenterology   • TONSILLECTOMY     • WISDOM TOOTH EXTRACTION        Social History:     Social History     Socioeconomic History   • Marital status: /Civil Union     Spouse name: None   • Number of children: None   • Years of education: None   • Highest education level: None   Occupational History   • None   Tobacco Use   • Smoking status: Never Smoker   • Smokeless tobacco: Never Used   Substance and Sexual Activity   • Alcohol use:  Yes     Alcohol/week: 10 0 standard drinks     Types: 10 Glasses of wine per week     Comment: daily   • Drug use: No   • Sexual activity: Yes     Partners: Male   Other Topics Concern   • None   Social History Narrative    Drinks coffee daily     Drinks cola daily    Drinks tea daily     Social Determinants of Health     Financial Resource Strain: Not on file   Food Insecurity: Not on file   Transportation Needs: Not on file   Physical Activity: Not on file   Stress: Not on file   Social Connections: Not on file   Intimate Partner Violence: Not on file   Housing Stability: Not on file      Family History:     Family History   Problem Relation Age of Onset   • Breast cancer Mother 72        dx in 63's   • Other Mother         stenosis   • Osteoporosis Mother    • Scleroderma Father    • Breast cancer Maternal Grandmother         dx in [de-identified]   • Other Family         back disorder   • No Known Problems Daughter    • Lung cancer Maternal Aunt    • No Known Problems Maternal Grandfather    • No Known Problems Paternal Grandmother    • No Known Problems Paternal Grandfather    • No Known Problems Son    • No Known Problems Maternal Aunt       Current Medications:     Current Outpatient Medications   Medication Sig Dispense Refill   • ALPRAZolam (XANAX) 0 5 mg tablet Take 1 tablet (0 5 mg total) by mouth daily at bedtime as needed for anxiety 30 tablet 1   • cetirizine (ZyrTEC) 10 mg tablet Take by mouth     • Cholecalciferol (VITAMIN D3) 2000 units TABS Take 1 tablet by mouth daily     • citalopram (CeleXA) 40 mg tablet Take 1 tablet (40 mg total) by mouth daily 90 tablet 3   • Naproxen Sodium 220 MG CAPS Take by mouth (Patient not taking: Reported on 8/9/2022)     • norethindrone-ethinyl estradiol (FEMHRT LOW DOSE) 0 5-2 5 MG-MCG per tablet Take 1 tablet by mouth daily 90 tablet 1   • pantoprazole (PROTONIX) 40 mg tablet TAKE 1 TABLET BY MOUTH 2 TIMES DAILY 60 tablet 3   • Sodium Fluoride 5000 PPM 1 1 % PSTE USE AS DIRECTED EVERY DAY AT BEDTIME       No current facility-administered medications for this visit        Allergies:     No Known Allergies   Physical Exam:     /72   Pulse 89   Temp 98 1 °F (36 7 °C)   Ht 5' 2" (1 575 m)   Wt 61 8 kg (136 lb 3 2 oz)   SpO2 98%   BMI 24 91 kg/m²     Physical Exam  Vitals and nursing note reviewed  Constitutional:       General: She is not in acute distress  Appearance: Normal appearance  She is not ill-appearing or diaphoretic  HENT:      Head: Normocephalic and atraumatic  Right Ear: Tympanic membrane, ear canal and external ear normal       Left Ear: Tympanic membrane, ear canal and external ear normal       Nose: Nose normal  No congestion or rhinorrhea  Mouth/Throat:      Mouth: Mucous membranes are moist       Pharynx: Oropharynx is clear  No posterior oropharyngeal erythema  Eyes:      General:         Right eye: No discharge  Left eye: No discharge  Extraocular Movements: Extraocular movements intact  Conjunctiva/sclera: Conjunctivae normal       Pupils: Pupils are equal, round, and reactive to light  Neck:      Vascular: No carotid bruit  Cardiovascular:      Rate and Rhythm: Normal rate and regular rhythm  Pulses: Normal pulses  Heart sounds: Normal heart sounds  No murmur heard  Pulmonary:      Effort: Pulmonary effort is normal  No respiratory distress  Breath sounds: Normal breath sounds  No wheezing or rhonchi  Abdominal:      General: Abdomen is flat  Bowel sounds are normal  There is no distension  Palpations: There is no mass  Tenderness: There is no abdominal tenderness  Musculoskeletal:         General: No swelling or deformity  Normal range of motion  Cervical back: Normal range of motion and neck supple  No rigidity  Right lower leg: No edema  Left lower leg: No edema  Lymphadenopathy:      Cervical: No cervical adenopathy  Skin:     General: Skin is warm and dry  Capillary Refill: Capillary refill takes less than 2 seconds  Coloration: Skin is not jaundiced  Findings: No bruising, erythema or rash  Neurological:      General: No focal deficit present  Mental Status: She is alert and oriented to person, place, and time  Cranial Nerves: No cranial nerve deficit  Sensory: No sensory deficit  Gait: Gait normal       Deep Tendon Reflexes: Reflexes normal    Psychiatric:         Mood and Affect: Mood normal          Behavior: Behavior normal          Thought Content:  Thought content normal          Judgment: Judgment normal           [address]

## 2022-11-04 NOTE — PROGRESS NOTES
Patient ID: Krystal Easton is a [address] y o  female  HPI: [address] [place] y  o female presents for f/u of anxiety   She cannot fall asleep because she has trouble shutting her mind down  She states she is fine with anxiety during the day, but xanax no longer works hs  SUBJECTIVE    Family History   Problem Relation Age of Onset   • Breast cancer Mother 72        dx in 63's   • Other Mother         stenosis   • Osteoporosis Mother    • Scleroderma Father    • Breast cancer Maternal Grandmother         dx in [de-identified]   • Other Family         back disorder   • No Known Problems Daughter    • Lung cancer Maternal Aunt    • No Known Problems Maternal Grandfather    • No Known Problems Paternal Grandmother    • No Known Problems Paternal Grandfather    • No Known Problems Son    • No Known Problems Maternal Aunt      Social History     Socioeconomic History   • Marital status: /Civil Union     Spouse name: Not on file   • Number of children: Not on file   • Years of education: Not on file   • Highest education level: Not on file   Occupational History   • Not on file   Tobacco Use   • Smoking status: Never Smoker   • Smokeless tobacco: Never Used   Substance and Sexual Activity   • Alcohol use: Yes     Alcohol/week: 10 0 standard drinks     Types: 10 Glasses of wine per week     Comment: daily   • Drug use: No   • Sexual activity: Yes     Partners: Male   Other Topics Concern   • Not on file   Social History Narrative    Drinks coffee daily     Drinks cola daily    Drinks tea daily     Social Determinants of Health     Financial Resource Strain: Not on file   Food Insecurity: Not on file   Transportation Needs: Not on file   Physical Activity: Not on file   Stress: Not on file   Social Connections: Not on file   Intimate Partner Violence: Not on file   Housing Stability: Not on file     Past Medical History:   Diagnosis Date   • Anxiety    • Arthritis    • Asthma    • [facility] )     Skin    basal cell carcinoma   • Fibroid 20 plus years ago   one in each breast   • GERD (gastroesophageal reflux disease)    • Irritable bowel syndrome    • Migraine     Occasional   • PONV (postoperative nausea and vomiting)    • Skin cancer    • Urinary tract infection     Occasionally     Past Surgical History:   Procedure Laterality Date   • BREAST BIOPSY      20 plus years ago   fibroids   • BREAST CYST ASPIRATION Bilateral    • BREAST SURGERY Bilateral     cyst removal   • COLONOSCOPY     • COLPOSCOPY  2019   • [place] ESOPHAGOGASTRODUODENOSCOPY TRANSORAL DIAGNOSTIC N/A 1/2/2018    Procedure: EGD AND COLONOSCOPY;  Surgeon: Kiran Dominique MD;  Location: [facility];   Service: Gastroenterology   • TONSILLECTOMY     • WISDOM TOOTH EXTRACTION       No Known Allergies    Current Outpatient Medications:   •  cetirizine (ZyrTEC) 10 mg tablet, Take by mouth, Disp: , Rfl:   •  Cholecalciferol (VITAMIN D3) 2000 units TABS, Take 1 tablet by mouth daily, Disp: , Rfl:   •  citalopram (CeleXA) 40 mg tablet, Take 1 tablet (40 mg total) by mouth daily, Disp: 90 tablet, Rfl: 3  •  clonazePAM (KlonoPIN) 0 5 mg tablet, Take 1 tablet (0 5 mg total) by mouth daily at bedtime, Disp: 30 tablet, Rfl: 3  •  norethindrone-ethinyl estradiol (FEMHRT LOW DOSE) 0 5-2 5 MG-MCG per tablet, Take 1 tablet by mouth daily, Disp: 90 tablet, Rfl: 1  •  pantoprazole (PROTONIX) 40 mg tablet, TAKE 1 TABLET BY MOUTH 2 TIMES DAILY, Disp: 60 tablet, Rfl: 3  •  Sodium Fluoride 5000 PPM 1 1 % PSTE, USE AS DIRECTED EVERY DAY AT BEDTIME, Disp: , Rfl:   •  Naproxen Sodium 220 MG CAPS, Take by mouth (Patient not taking: No sig reported), Disp: , Rfl:     Review of Systems  Constitutional:     Denies fever, chills ,+fatigue ,no weakness ,weight loss,or weight gain     ENT: Denies earache ,loss of hearing ,nosebleed, nasal discharge,nasal congestion ,sore throat ,hoarseness  Pulmonary: Denies shortness of breath ,cough  ,dyspnea on exertion, orthopnea  ,PND   Cardiovascular:  Denies bradycardia , tachycardia  ,palpations, lower extremity edema leg, claudication  Breast:  Denies new or changing breast lumps ,nipple discharge ,nipple changes  Abdomen:  Denies abdominal pain , anorexia , indigestion, nausea, vomiting, constipation, diarrhea  Musculoskeletal: Denies myalgias, arthralgias, joint swelling, joint stiffness , limb pain, limb swelling  Gu: denies dysuria, polyuria  Skin: Denies skin rash, skin lesion, skin wound, itching, dry skin  Neuro: Denies headache, numbness, tingling, confusion, loss of consciousness, dizziness, vertigo  Psychiatric: Denies feelings of depression, suicidal ideation, +anxiety, sleep disturbances    OBJECTIVE  /72   Pulse 89   Temp 98 1 °F (36 7 °C)   Ht 5' 2" (1 575 m)   Wt 61 8 kg (136 lb 3 2 oz)   SpO2 98%   BMI 24 91 kg/m²   Constitutional:   NAD, well appearing and well nourished      ENT:   Conjunctiva and lids: no injection, edema, or discharge     Pupils and iris: NAVDEEP bilaterally    External inspection of ears and nose: normal without deformities or discharge  Otoscopic exam: Canals patent without erythema  Nasal mucosa, septum and turbinates: Normal or edema or discharge         Oropharynx:  Moist mucosa, normal tongue and tonsils without lesions  No erythema        Pulmonary:Respiratory effort normal rate and rhythm, no increased work of breathing   Auscultation of lungs:  Clear bilaterally with no adventitious breath sounds       Cardiovascular: regular rate and rhythm, S1 and S2, no murmur, no edema and/or varicosities of LE      Abdomen: Soft and non-distended     Positive bowel sounds      No heptomegaly or splenomegaly      Gu: no suprapubic tenderness or CVA tenderness, no urethral discharge  Lymphatic:  No anterior or posterior cervical lymphadenopathy         Musculoskeletal:  Gait and station: Normal gait      Digits and nails normal without clubbing or cyanosis       Inspection/palpation of joints, bones, and muscles:  No joint tenderness, swelling, full active and passive range of motion       Skin: Normal skin turgor and no rashes      Neuro:    Normal reflexes      Psych:   alert and oriented to person, place and time     normal mood and affect       Assessment/Plan:Diagnoses and all orders for this visit:    Annual physical exam  -     Comprehensive metabolic panel; Future  -     Lipid Panel with Direct LDL reflex; Future    Anxiety  -     clonazePAM (KlonoPIN) 0 5 mg tablet; Take 1 tablet (0 5 mg total) by mouth daily at bedtime    Need for vaccination  -     influenza vaccine, quadrivalent, recombinant, PF, 0 5 mL, for patients 18 yr+ (FLUBLOK)        Reviewed with patient plan to treat with above plan      Patient instructed to call in 72 hours if not feeling better or if symptoms worsen

## 2022-11-07 ENCOUNTER — APPOINTMENT (OUTPATIENT)
Dept: LAB | Facility: CLINIC | Age: 57
End: 2022-11-07

## 2022-11-07 DIAGNOSIS — Z00.00 ANNUAL PHYSICAL EXAM: ICD-10-CM

## 2022-11-07 LAB
ALBUMIN SERPL BCP-MCNC: 3.4 G/DL (ref 3.5–5)
ALP SERPL-CCNC: 63 U/L (ref 46–116)
ALT SERPL W P-5'-P-CCNC: 20 U/L (ref 12–78)
ANION GAP SERPL CALCULATED.3IONS-SCNC: 7 MMOL/L (ref 4–13)
AST SERPL W P-5'-P-CCNC: 17 U/L (ref 5–45)
BILIRUB SERPL-MCNC: 0.68 MG/DL (ref 0.2–1)
BUN SERPL-MCNC: 13 MG/DL (ref 5–25)
CALCIUM ALBUM COR SERPL-MCNC: 9.6 MG/DL (ref 8.3–10.1)
CALCIUM SERPL-MCNC: 9.1 MG/DL (ref 8.3–10.1)
CHLORIDE SERPL-SCNC: 107 MMOL/L (ref 96–108)
CHOLEST SERPL-MCNC: 210 MG/DL
CO2 SERPL-SCNC: 24 MMOL/L (ref 21–32)
CREAT SERPL-MCNC: 0.82 MG/DL (ref 0.6–1.3)
GFR SERPL CREATININE-BSD FRML MDRD: 80 ML/MIN/1.73SQ M
GLUCOSE P FAST SERPL-MCNC: 110 MG/DL (ref 65–99)
HDLC SERPL-MCNC: 54 MG/DL
LDLC SERPL CALC-MCNC: 128 MG/DL (ref 0–100)
POTASSIUM SERPL-SCNC: 3.9 MMOL/L (ref 3.5–5.3)
PROT SERPL-MCNC: 7.2 G/DL (ref 6.4–8.4)
SODIUM SERPL-SCNC: 138 MMOL/L (ref 135–147)
TRIGL SERPL-MCNC: 139 MG/DL

## 2022-11-13 DIAGNOSIS — F32.A DEPRESSION, UNSPECIFIED DEPRESSION TYPE: ICD-10-CM

## 2022-11-14 RX ORDER — CITALOPRAM 40 MG/1
TABLET ORAL
Qty: 90 TABLET | Refills: 3 | Status: SHIPPED | OUTPATIENT
Start: 2022-11-14

## 2022-11-17 ENCOUNTER — TELEPHONE (OUTPATIENT)
Dept: FAMILY MEDICINE CLINIC | Facility: CLINIC | Age: 57
End: 2022-11-17

## 2022-11-17 NOTE — TELEPHONE ENCOUNTER
Patient called to give you an update since starting the clonazepam    She states that she believes the medicine is helping her fall into a deeper sleep, however she is still waking up after a few hours and having a hard time getting back to sleep again    She is wondering if the dose could be increased from 0 5 mg to 1 mg

## 2022-11-18 DIAGNOSIS — G47.00 INSOMNIA, UNSPECIFIED TYPE: Primary | ICD-10-CM

## 2022-11-18 RX ORDER — TEMAZEPAM 15 MG/1
15 CAPSULE ORAL
Qty: 10 CAPSULE | Refills: 0 | Status: SHIPPED | OUTPATIENT
Start: 2022-11-18 | End: 2022-11-25 | Stop reason: SDUPTHER

## 2022-11-18 NOTE — TELEPHONE ENCOUNTER
Patient actually received a scheduling ticket and they are not available until February  She is asking if you can change her to a different sleep med that will not make her loopy   She thinks she is just going through a bad cycle and needs some help to get out of it

## 2022-11-18 NOTE — TELEPHONE ENCOUNTER
I spoke with patient, she states that at present she is taking klonopin  She states in the past she has been on Zanax, Lunesta and Ambien

## 2022-11-25 ENCOUNTER — TELEPHONE (OUTPATIENT)
Dept: FAMILY MEDICINE CLINIC | Facility: CLINIC | Age: 57
End: 2022-11-25

## 2022-11-25 DIAGNOSIS — G47.00 INSOMNIA, UNSPECIFIED TYPE: ICD-10-CM

## 2022-11-25 RX ORDER — TEMAZEPAM 15 MG/1
15 CAPSULE ORAL
Qty: 30 CAPSULE | Refills: 2 | Status: SHIPPED | OUTPATIENT
Start: 2022-11-25

## 2022-11-25 NOTE — TELEPHONE ENCOUNTER
Patient states that the Temazepam is working well for her and would like you to send in a full prescription to the pharmacy please

## 2022-11-30 ENCOUNTER — OFFICE VISIT (OUTPATIENT)
Dept: SLEEP CENTER | Facility: CLINIC | Age: 57
End: 2022-11-30

## 2022-11-30 VITALS
DIASTOLIC BLOOD PRESSURE: 78 MMHG | OXYGEN SATURATION: 99 % | WEIGHT: 137 LBS | HEIGHT: 62 IN | SYSTOLIC BLOOD PRESSURE: 122 MMHG | BODY MASS INDEX: 25.21 KG/M2 | HEART RATE: 82 BPM

## 2022-11-30 DIAGNOSIS — F41.1 GENERALIZED ANXIETY DISORDER: ICD-10-CM

## 2022-11-30 DIAGNOSIS — G47.00 INSOMNIA, UNSPECIFIED TYPE: ICD-10-CM

## 2022-11-30 DIAGNOSIS — G47.33 OSA (OBSTRUCTIVE SLEEP APNEA): Primary | ICD-10-CM

## 2022-11-30 DIAGNOSIS — M54.2 NECK PAIN: ICD-10-CM

## 2022-11-30 DIAGNOSIS — G47.19 EXCESSIVE DAYTIME SLEEPINESS: ICD-10-CM

## 2022-11-30 DIAGNOSIS — F40.240 CLAUSTROPHOBIA: ICD-10-CM

## 2022-11-30 DIAGNOSIS — K21.9 GASTROESOPHAGEAL REFLUX DISEASE, UNSPECIFIED WHETHER ESOPHAGITIS PRESENT: ICD-10-CM

## 2022-11-30 DIAGNOSIS — J30.2 SEASONAL ALLERGIES: ICD-10-CM

## 2022-11-30 NOTE — PROGRESS NOTES
Consultation - [id number]  56 y o  female  :1965  ATH:[id number]  DOS:2022    Physician Requesting Consult: Korey Morgan, [facility]             Reason for Consult : [At your kind request] I saw Stephon Barrow for initial sleep evaluation today  The patient is here for a complaint of Disrupted sleep      PFSH, Problem List, Medications & Allergies were reviewed in EMR  Guero Varela  has a past medical history of Anxiety, Arthritis, Asthma, Cancer ([address] ), Fibroid, GERD (gastroesophageal reflux disease), Irritable bowel syndrome, Migraine, PONV (postoperative nausea and vomiting), Skin cancer, and Urinary tract infection  She has a current medication list which includes the following prescription(s): cetirizine, vitamin d3, citalopram, norethindrone-ethinyl estradiol, pantoprazole, sodium fluoride 5000 ppm, temazepam, clonazepam, and naproxen sodium  HPI:  She reports “I have never been a good sleeper “and in the past has tried various hypnotic medications  Her main issue is frequent interruptions of sleep that have gotten much worse in the past year   reports loud snoring of several years duration to the extent that he has to leave the room  She has a woken herself with snoring, coughing or choking  Symptoms are worse when she is supine  Other Complaints:  Postnasal drip for which she uses Zyrtec  Restless Leg Syndrome: reports no suggestive symptoms  Parasomnia: no features reported    Sleep Routine (on average): [ ] Typical Bedtime:  10:30 p m  Gets OOB:  8:00 a m  TIB:9 5 hrs  Sleep latency: upto 60 minutes because of racing thoughts due to anxiety and reads to assist falling asleep Sleep Interruptions:3-4/nite in spite of temazepam as a sleep aid, is [not [always] sure of the cause and [ ] struggles to fall back asleep]  Awakens: by disturbance from pets   Upon awakening: rarely feels refreshed [ ]   [She estimates getting 6 5 hrs sleep ] Guero Varela reports [Excessive] [Daytime] Sleepiness [ ][feels drowsy [in the afternoons]] and tries to nap and at times would fall asleep up to an hour  She rated [herself] at Total score: 11 /24 on the [place] Sleepiness Scale  Habits:  reports that she has never smoked  She has never used smokeless tobacco [ ]; [  E-Cigarette/Vaping   ][ ]; [ reports no history of drug use ];  reports current alcohol use of about 10 0 standard drinks per week  [ ]; Caffeine use:limited[ ]; Exercise routine: regular [ ]  Family History: Mother has RLS  ROS - reviewed and as attached  [Significant for weight has been stable     She has postnasal drip  She reported no respiratory or cardiac symptoms  Acid reflux is controlled  She has musculoskeletal aches and pains  She is postmenopausal for approximately 3 years  She has difficulty with memory, feelings of anxiety and mood changes]  EXAM:  /78   Pulse 82   Ht 5' 2" (1 575 m)   Wt 62 1 kg (137 lb)   SpO2 99%   BMI 25 06 kg/m²    General: [Well] groomed female, well appearing, in [no apparent] distress  Neurological: Alert and orientated; [ ]cooperative; Cranial nerves intact;    Psychiatric: Speech:  Pressured; anxious, otherwise Normal mood, affect & thought   Skin: [warm and dry]; Color& Hydration [good]; [no] facial rashes or lesions   HEENT:  Craniofacial anatomy: slight overjet  Sinuses: [non-] tender  TMJ: [Normal]    Eyes: EOM's [intact];[ ] conjunctiva/corneas clear   Ears: Externally[appear normal]     Nasal Airway: is patent Septum:[intact]; Mucosa: [normal]; Turbinates: [normal]; Rhinorrhea: [None]   Mouth: Lips: [normal posture]; Dentition: normal   Mucosa:[moist] [ ]; Hard Palate:short    Oropharryx: crowded and AP narrowing Tongue: Mallampati:Class IV, Mobile and Macroglossia  I was unable to visualize posterior pharynx  Neck:[] [Neck Circumference: 13 25 ";] Supple; [no] abnormal masses; Thyroid:[normal]  Trachea:[central]      Lymph: [No] Cervical [or] Submandibular Lymhadenopathy  Heart: S1,S2 normal; [RRR]; [no] gallop; [no] murmur[s]   Lungs: Respiratory Effort:[normal]  Air entry [good] [bilaterally]  [No] wheezes  [No] rales  Abdomen: [Obese,] Soft & non-tender    Extremities: [No] pedal edema  [No] clubbing or cyanosis  Musculoskeletal:  Motor [normal]; Gait:[normal]  IMPRESSION: Primary/Secondary Sleep Diagnoses (to Medical or Psych conditions) & Comorbidities   1  CLARA (obstructive sleep apnea)  Home Study      2  Insomnia, unspecified type  Ambulatory Referral to Sleep Medicine      3  Generalized anxiety disorder        4  Excessive daytime sleepiness        5  Claustrophobia        6  Seasonal allergies        7  Neck pain        8  Gastroesophageal reflux disease, unspecified whether esophagitis present             PLAN:   With respect to above conditions, comprehensive counseling provided on pathophysiology; effects on symptoms and comorbidities, diagnostic strategies & limitations; treatment options; risks or no treament; risks & benefits of the various therapeutic options; costs and insurance aspects  I advised [weight reduction,] avoiding sleeping supine, using alcohol or sedating medications close to bed time [and on safe driving practices]  Nocturnal polysomnography is indicated and [a diagnostic study] will be scheduled  [Patient is unwilling to try Positive airway pressure therapy because of claustrophobia ]   Follow-up to be scheduled [after the studies] to review results, further details of treatment options and to initiate therapy  Sincerely,      Authenticated electronically on 15/96/75   Board Certified Specialist     Portions of the record may have been created with voice recognition software  Occasional wrong word or "sound a like" substitutions may have occurred due to the inherent limitations of voice recognition software   There may also be notations and random deletions of words or characters from malfunctioning software  Read the chart carefully and recognize, using context, where substitutions/deletions have occurred

## 2022-11-30 NOTE — PROGRESS NOTES
Review of Systems      Genitourinary need to urinate more than twice a night and hot flashes at night   Cardiology none   Gastrointestinal frequent heartburn/acid reflux   Neurology frequent headaches, awaken with headache, muscle weakness, numbness/tingling of an extremity, forgetfulness and poor concentration or confusion,    Constitutional claustrophobia, fatigue and excessive sweating at night   Integumentary rash or dry skin and itching   Psychiatry anxiety and mood change   Musculoskeletal joint pain, muscle aches and back pain   Pulmonary snoring   ENT throat clearing and ringing in ears   Endocrine none   Hematological none

## 2022-11-30 NOTE — PATIENT INSTRUCTIONS
What is CLARA? Obstructive sleep apnea is a common and serious sleep disorder that causes you to stop breathing during sleep  The airway repeatedly becomes blocked, limiting the amount of air that reaches your lungs  When this happens, you may snore loudly or making choking noises as you try to breathe  Your brain and body becomes oxygen deprived and you may wake up  This may happen a few times a night, or in more severe cases, several hundred times a night  Sleep apnea can make you wake up in the morning feeling tired or unrefreshed even though you have had a full night of sleep  During the day, you may feel fatigued, have difficulty concentrating or you may even unintentionally fall asleep  This is because your body is waking up numerous times throughout the night, even though you might not be conscious of each awakening  The lack of oxygen your body receives can have negative long-term consequences for your health  This includes:  High blood pressure  Heart disease  Irregular heart rhythms  Stroke  Pre-diabetes and diabetes  Depression    Testing  An objective evaluation of your sleep may be needed before your board certified sleep physician can make a diagnosis  Options include:   In-lab overnight sleep study  This type of sleep study requires you to stay overnight at a sleep center, in a bed that may resemble a hotel room  You will sleep with sensors hooked up to various parts of your body  These sensors record your brain waves, heartbeat, breathing and movement  An overnight sleep study also provides your doctor with the most complete information about your sleep  Learn more about an overnight sleep study  Home sleep apnea test  Some patients with high risk factors for obstructive sleep apnea and no other medical disorders may be candidates for a home sleep apnea test  The testing equipment differs in that it is less complicated than what is used in an overnight sleep study   As such, does not give all the data an in-lab will and if negative, may not mean you do not have the problem  Treatment for sleep apnea  includes using a continuous positive airway pressure (CPAP) machine to keep your airway open during sleep  A mask is placed over your nose and mouth, or just your nose  The mask is hooked to the CPAP machine that blows a gentle stream of air into the mask when you breathe  This helps keep your airway open so you can breathe more regularly  Extra oxygen may be given to you through the machine  You may be given a mouth device  It looks like a mouth guard or dental retainer and stops your tongue and mouth tissues from blocking your throat while you sleep  Surgery may be needed to remove extra tissues that block your mouth, throat, or nose  Manage sleep apnea:   Do not smoke  Nicotine and other chemicals in cigarettes and cigars can cause lung damage  Ask your healthcare provider for information if you currently smoke and need help to quit  E-cigarettes or smokeless tobacco still contain nicotine  Talk to your healthcare provider before you use these products  Do not drink alcohol or take sedative medicine before you go to sleep  Alcohol and sedatives can relax the muscles and tissues around your throat  This can block the airflow to your lungs  Maintain a healthy weight  Excess tissue around your throat may restrict your breathing  Ask your healthcare provider for information if you need to lose weight  Sleep on your side or use pillows designed to prevent sleep apnea  This prevents your tongue or other tissues from blocking your throat  You can also raise the head of your bed  Driving Safety  Refrain from driving when drowsy  Follow up with your healthcare provider as directed:  Write down your questions so you remember to ask them during your visits  Go to AASM website for more information: Sleepeducation  org     What is CLARA?    Obstructive sleep apnea is a common and serious sleep disorder that causes you to stop breathing during sleep  The airway repeatedly becomes blocked, limiting the amount of air that reaches your lungs  When this happens, you may snore loudly or making choking noises as you try to breathe  Your brain and body becomes oxygen deprived and you may wake up  This may happen a few times a night, or in more severe cases, several hundred times a night  Sleep apnea can make you wake up in the morning feeling tired or unrefreshed even though you have had a full night of sleep  During the day, you may feel fatigued, have difficulty concentrating or you may even unintentionally fall asleep  This is because your body is waking up numerous times throughout the night, even though you might not be conscious of each awakening  The lack of oxygen your body receives can have negative long-term consequences for your health  This includes:  High blood pressure  Heart disease  Irregular heart rhythms  Stroke  Pre-diabetes and diabetes  Depression    Testing  An objective evaluation of your sleep may be needed before your board certified sleep physician can make a diagnosis  Options include:   In-lab overnight sleep study  This type of sleep study requires you to stay overnight at a sleep center, in a bed that may resemble a hotel room  You will sleep with sensors hooked up to various parts of your body  These sensors record your brain waves, heartbeat, breathing and movement  An overnight sleep study also provides your doctor with the most complete information about your sleep  Learn more about an overnight sleep study  Home sleep apnea test  Some patients with high risk factors for obstructive sleep apnea and no other medical disorders may be candidates for a home sleep apnea test  The testing equipment differs in that it is less complicated than what is used in an overnight sleep study   As such, does not give all the data an in-lab will and if negative, may not mean you do not have the problem  Treatment for sleep apnea  includes using a continuous positive airway pressure (CPAP) machine to keep your airway open during sleep  A mask is placed over your nose and mouth, or just your nose  The mask is hooked to the CPAP machine that blows a gentle stream of air into the mask when you breathe  This helps keep your airway open so you can breathe more regularly  Extra oxygen may be given to you through the machine  You may be given a mouth device  It looks like a mouth guard or dental retainer and stops your tongue and mouth tissues from blocking your throat while you sleep  Surgery may be needed to remove extra tissues that block your mouth, throat, or nose  Manage sleep apnea:   Do not smoke  Nicotine and other chemicals in cigarettes and cigars can cause lung damage  Ask your healthcare provider for information if you currently smoke and need help to quit  E-cigarettes or smokeless tobacco still contain nicotine  Talk to your healthcare provider before you use these products  Do not drink alcohol or take sedative medicine before you go to sleep  Alcohol and sedatives can relax the muscles and tissues around your throat  This can block the airflow to your lungs  Maintain a healthy weight  Excess tissue around your throat may restrict your breathing  Ask your healthcare provider for information if you need to lose weight  Sleep on your side or use pillows designed to prevent sleep apnea  This prevents your tongue or other tissues from blocking your throat  You can also raise the head of your bed  Driving Safety  Refrain from driving when drowsy  Follow up with your healthcare provider as directed:  Write down your questions so you remember to ask them during your visits  Go to AASM website for more information: Sleepeducation  org       What you can do to improve your sleep: (Sleep Hygiene) Basic rules for a good night's sleep    Create a regular sleep schedule    This will help you form a sleep routine  Keep a record of your sleep patterns, and any sleeping problems you have  Bring the record to follow-up visits with healthcare providers  Avoid prolonged use of light-emitting screens before bedtime or watching TV in bed  Avoid forcing sleep  Do not take naps  Naps could make it hard for you to fall asleep at bedtime  Deal with your worries before bedtime  Keep your bedroom cool, quiet, and dark  Turn on white noise, such as a fan, to help you relax  Do not use your bed for any activity that will keep you awake  Do not read, exercise, eat, or watch TV in your bedroom  Get up if you do not fall asleep within 20 minutes  Move to another room and do something relaxing until you become sleepy  Limit caffeine, alcohol, nicotine and food to earlier in the day  Only drink caffeine in the morning  Do not drink alcohol within 6 hours of bedtime  Do not eat a heavy meal right before you go to bed  Avoid smoking, especially in the evening  Exercise regularly  Daily exercise will help you sleep better  Do not exercise within 4 hours of bedtime  Stimulus control therapy rules  1  Go to bed only when sleepy  2  Do not watch television, read, eat, or worry while in bed  Use bed only for sleep and sex  3  Get out of bed if unable to fall asleep within 20 minutes and go to another room  Return to bed only when sleepy  Repeat this step as many times as necessary throughout the night  4  Set an alarm clock to wake up at a fixed time each morning, including weekends  5  Do not take a nap during the day  Data from: [address] [place] [address], [address] Nonpharmacologic treatments of insomnia  J Clin Psychiatry 9356; 53:37  Go to AASM website for more information: Sleepeducation  org     Recommended Reading:  Book by authors [address]   No More sleepless nights          Nursing Support:  When: Monday through Friday 7A-5PM except holidays  Where: Our direct line is [phone]  If you are having a true emergency please call 911  In the event that the line is busy or it is after hours please leave a voice message and we will return your call  Please speak clearly, leaving your full name, birth date, best number to reach you and the reason for your call  Medication refills: We will need the name of the medication, the dosage, the ordering provider, whether you get a 30 or 90 day refill, and the pharmacy name and address  Medications will be ordered by the provider only  Nurses cannot call in prescriptions  Please allow 7 days for medication refills  Physician requested updates: If your provider requested that you call with an update after starting medication, please be ready to provide us the medication and dosage, what time you take your medication, the time you attempt to fall asleep, time you fall asleep, when you wake up, and what time you get out of bed  Sleep Study Results: We will contact you with sleep study results and/or next steps after the physician has reviewed your testing

## 2023-01-18 ENCOUNTER — TELEPHONE (OUTPATIENT)
Dept: FAMILY MEDICINE CLINIC | Facility: CLINIC | Age: 58
End: 2023-01-18

## 2023-01-18 DIAGNOSIS — F41.9 ANXIETY: Primary | ICD-10-CM

## 2023-01-18 RX ORDER — ALPRAZOLAM 0.25 MG/1
0.25 TABLET ORAL 3 TIMES DAILY PRN
Qty: 60 TABLET | Refills: 0 | Status: SHIPPED | OUTPATIENT
Start: 2023-01-18

## 2023-01-18 NOTE — TELEPHONE ENCOUNTER
Patient is requesting a refill on her Alprazolam  I did not see it on her med list to request the refill    She also wanted you to know that she is not taking all of the meds on her med list  She tried a few different ones to see what worked best for her and she says she decided that the Alprazolam is what she prefers

## 2023-02-23 ENCOUNTER — TELEPHONE (OUTPATIENT)
Dept: FAMILY MEDICINE CLINIC | Facility: CLINIC | Age: 58
End: 2023-02-23

## 2023-02-23 DIAGNOSIS — J01.90 ACUTE SINUSITIS, RECURRENCE NOT SPECIFIED, UNSPECIFIED LOCATION: Primary | ICD-10-CM

## 2023-02-23 RX ORDER — BROMPHENIRAMINE MALEATE, PSEUDOEPHEDRINE HYDROCHLORIDE, AND DEXTROMETHORPHAN HYDROBROMIDE 2; 30; 10 MG/5ML; MG/5ML; MG/5ML
10 SYRUP ORAL 4 TIMES DAILY PRN
Qty: 180 ML | Refills: 0 | Status: SHIPPED | OUTPATIENT
Start: 2023-02-23

## 2023-02-23 RX ORDER — METHYLPREDNISOLONE 4 MG/1
TABLET ORAL
Qty: 21 EACH | Refills: 0 | Status: SHIPPED | OUTPATIENT
Start: 2023-02-23

## 2023-02-23 NOTE — TELEPHONE ENCOUNTER
Patient has had symptoms since Monday  Would like to know what she can take or have prescribed to take care of this        Covid Negative    Symptoms:    Sneezing  Headaches  Congestion  Coughing with mucus    Denies all other symptoms    Pharmacy # [facility]     Pt # [phone]

## 2023-03-01 ENCOUNTER — HOSPITAL ENCOUNTER (OUTPATIENT)
Dept: SLEEP CENTER | Facility: CLINIC | Age: 58
Discharge: HOME | End: 2023-03-01

## 2023-03-01 DIAGNOSIS — G47.33 OSA (OBSTRUCTIVE SLEEP APNEA): ICD-10-CM

## 2023-03-03 NOTE — PROGRESS NOTES
Home Sleep Study Documentation    HOME STUDY DEVICE: Noxturnal no                                           Yessi G3 yes      Pre-Sleep Home Study:    Set-up and instructions performed by: Madelyn Prescott performed demonstration for Patient: yes    Return demonstration performed by Patient: yes    Written instructions provided to Patient: yes    Patient signed consent form: yes        Post-Sleep Home Study:    Additional comments by Patient: None    Home Sleep Study Failed:no:    Failure reason: N/A    Reported or Detected: N/A    Scored by: Kaur Marquez

## 2023-03-08 ENCOUNTER — TELEPHONE (OUTPATIENT)
Dept: SLEEP CENTER | Facility: CLINIC | Age: 58
End: 2023-03-08

## 2023-03-08 NOTE — TELEPHONE ENCOUNTER
Called patient and advised sleep study resulted and does not show CLARA  Offered to schedule follow up with Dr Ash Sandoval but patient declined at this time  States she is going to try Flonase and a humidfier to relieve symptoms  She will call back in the future if she'd like to schedule an appointment

## 2023-03-15 ENCOUNTER — TELEPHONE (OUTPATIENT)
Dept: FAMILY MEDICINE CLINIC | Facility: CLINIC | Age: 58
End: 2023-03-15

## 2023-03-15 NOTE — TELEPHONE ENCOUNTER
Patient called in stating that her sleep study results came back that she does not have sleep apnea  She is asking if there is another medication that can help her sleep that does not make her hallucinate  She has already tried clonazepam and temazepam      She is asking if something can be sent to [facility]

## 2023-03-15 NOTE — TELEPHONE ENCOUNTER
Patient had tried both Ambien and Lunesta in the past  She felt that they have her hallucinations  She said she was seeing double of things in her room

## 2023-03-16 DIAGNOSIS — G47.00 INSOMNIA, UNSPECIFIED TYPE: Primary | ICD-10-CM

## 2023-03-16 NOTE — TELEPHONE ENCOUNTER
Patient called back  She was able to obtain the savings card   She is asking to dispense medication to [facility]

## 2023-03-16 NOTE — TELEPHONE ENCOUNTER
Patient called and advised that she downloaded the discount card for quviviq and will upload and send a copy to you via her statusboomt

## 2023-03-16 NOTE — TELEPHONE ENCOUNTER
Patient called and was given information on quviviq medication and will go online to see if there is a discount card available

## 2023-03-17 DIAGNOSIS — F41.9 ANXIETY: ICD-10-CM

## 2023-03-17 NOTE — TELEPHONE ENCOUNTER
[id number] 01/18/2023 01/18/2023 ALPRAZolam (Tablet)  60 0 20 0 25 MG NA MELISSA [employer]  [employer] 0 / 0 [place]     1  [id number] 12/27/2022 11/25/2022 Temazepam (Capsule)  30 0 30 15 MG NA MELISSA BROADT  [address]  [employer] 1 / 2 [place]    1  [id number] 11/27/2022 11/25/2022 Temazepam (Capsule)  30 0 30 15 MG NA MELISSA BROADT  [address]  [employer] 0 / 2 [place]    1  [id number] 11/18/2022 11/18/2022 Temazepam (Capsule)  10 0 10 15 MG NA MELISSA BROADT  [address] [employer] 0 / 0 [place]    1  [id number] 11/04/2022 11/04/2022 clonazePAM (Tablet)  30 0 30 0 5 MG NA MELISSA BROADT  [employer], [employer]    [employer] 0 / 3 [place]

## 2023-03-20 RX ORDER — ALPRAZOLAM 0.25 MG/1
0.25 TABLET ORAL 3 TIMES DAILY PRN
Qty: 60 TABLET | Refills: 0 | Status: SHIPPED | OUTPATIENT
Start: 2023-03-20

## 2023-04-05 DIAGNOSIS — N95.1 PERIMENOPAUSAL SYMPTOMS: ICD-10-CM

## 2023-04-05 RX ORDER — NORETHINDRONE ACETATE AND ETHINYL ESTRADIOL .5; 2.5 MG/1; UG/1
TABLET ORAL
Qty: 84 TABLET | Refills: 1 | Status: SHIPPED | OUTPATIENT
Start: 2023-04-05

## 2023-05-04 ENCOUNTER — ANNUAL EXAM (OUTPATIENT)
Dept: OBGYN CLINIC | Facility: CLINIC | Age: 58
End: 2023-05-04

## 2023-05-04 VITALS — BODY MASS INDEX: 25.24 KG/M2 | SYSTOLIC BLOOD PRESSURE: 138 MMHG | WEIGHT: 138 LBS | DIASTOLIC BLOOD PRESSURE: 80 MMHG

## 2023-05-04 DIAGNOSIS — R92.2 DENSE BREAST TISSUE ON MAMMOGRAM: ICD-10-CM

## 2023-05-04 DIAGNOSIS — Z12.31 ENCOUNTER FOR SCREENING MAMMOGRAM FOR MALIGNANT NEOPLASM OF BREAST: ICD-10-CM

## 2023-05-04 DIAGNOSIS — Z01.411 ENCOUNTER FOR GYNECOLOGICAL EXAMINATION (GENERAL) (ROUTINE) WITH ABNORMAL FINDINGS: Primary | ICD-10-CM

## 2023-05-04 DIAGNOSIS — N95.2 VAGINAL ATROPHY: ICD-10-CM

## 2023-05-04 PROBLEM — Z01.419 ROUTINE GYNECOLOGICAL EXAMINATION: Status: RESOLVED | Noted: 2020-02-25 | Resolved: 2023-05-04

## 2023-05-04 PROBLEM — Z01.419 ENCOUNTER FOR GYNECOLOGICAL EXAMINATION (GENERAL) (ROUTINE) WITHOUT ABNORMAL FINDINGS: Status: RESOLVED | Noted: 2019-02-19 | Resolved: 2023-05-04

## 2023-05-04 RX ORDER — CLINDAMYCIN PHOSPHATE 10 UG/ML
LOTION TOPICAL
COMMUNITY
Start: 2023-02-08

## 2023-05-04 RX ORDER — ESTRADIOL 0.1 MG/G
CREAM VAGINAL
Qty: 42.5 G | Refills: 0 | Status: SHIPPED | OUTPATIENT
Start: 2023-05-04

## 2023-05-04 NOTE — PROGRESS NOTES
Assessment/Plan   Diagnoses and all orders for this visit:    Encounter for gynecological examination (general) (routine) with abnormal findings  The current ASCCP guidelines were reviewed  Patient's last pap was 2/25/20 - WNL (-) HRHPV type 16/18 neg and therefore, a pap with HPV cotesting is not indicated at this time  I emphasized the importance of an annual pelvic and breast exam  Patient ok to defer pap today  Vaginal atrophy  -     estradiol (ESTRACE VAGINAL) 0 1 mg/g vaginal cream; Place pea sized amount PV q hs x 2 weeks; then place pea sized amount PV 2-3x/week for maintenance  I reviewed the pathophysiology of vaginal atrophy  She has tried without much relief OTC remedies like coconut oil, Replens, KY jelly  Desires something more - discussed vaginal estrogen replacement cream  Currently on low dose Femhrt for vasomotor symptoms - takes every other day with positive relief  Tried to d/c but hot flashes returned  Discussed using vaginal estrogen on the days she is not taking her oral HRT  Will plan estrace vaginal cream - pea sized amount PV q hs x 2 weeks, then 2-3 times per week  Encourage her to continue with coconut oil or KY jelly during intercourse  Discussed risks of HRT including but not limited to VTE, heart attacks, strokes and breast cancer risk with long term use  All questions were answered to her satisfaction  RTO in 2 months to follow-up on vaginal HRT start-up  Patient to contact office when needs refill for oral HRT  Encounter for screening mammogram for malignant neoplasm of breast  -     Mammo screening bilateral w 3d & cad; Future  -     US breast screening bilateral complete (ABUS); Future  Dense breast tissue on mammogram  -     Mammo screening bilateral w 3d & cad; Future  -     US breast screening bilateral complete (ABUS);  Future  A yearly mammogram is recommended for breast cancer screening starting at age 36; Type C dense breast tissue may obscure small masses on mammogram  Patient can consider B/L screening ultrasound to supplement her B/L screening mammogram - strongly encourage patient to check with insurance for coverage first     Discussion  I have discussed the importance of monthly self-breast exams, exercise and healthy diet as well as adequate intake of calcium and vitamin D  Encourage at least 1200 mg calcium citrate + 2000 IUs vitamin D3 divided through diet and supplement throughout the day; Encourage 30-40 min weight bearing exercise most days of week  STI testing - declines  In addition, colon cancer screening with a colonoscopy is recommended starting at age 36-53 and reviewed benefits - she is up to date with screening  All questions have been answered to her satisfaction  RTO for APE or sooner if needed    Subjective     HPI   Kaylynn Boothe is a 66 y o  female who presents for annual well woman exam    LMP - 2/2019 - age 48; Denies  bleeding  No vulvar itch/burn; No vaginal itch/burn; No abn discharge or odor; No urinary sx - burning/pain/frequency/hematuria  (+) SBEs - no breast masses, asymmetry, nipple discharge or bleeding, changes in skin of breast, or breast tenderness bilaterally  No abd/pelvic pain or HAs;   (+) menopausal symptoms: No hot flashes/night sweats - under control with Low dose Femhrt - takes every other day with positive effect (script needs to be daily because insurance coverage better than written every other day); Did try to stop but hot flashes returned so continues; (+) problems with intercourse, vaginal dryness - significant and bothered by - has tried coconut oil, replens, KY without any relief - desires something more; sleeping poor - just went through a sleep study  Pt is sexually active in a mutually monog/ sexual relationship; She declines sti/hiv/hep testing; Feels safe at home  (+) PCP for routine Bw/care;     Last Pap - 2/25/20 - WNL (-) HRHPV type 16/18 neg  History of abnormal Pap smear: no  Last mammo - 24 - Birads 1 negative; Type C density  History of abnormal mammogram: yes  Last colonoscopy - 20 - follow-up 10 years    Review of Systems   Constitutional: Negative for activity change, fatigue, fever and unexpected weight change  HENT: Negative for congestion, dental problem, sinus pressure and sinus pain  Eyes: Negative for visual disturbance  Respiratory: Negative for cough, shortness of breath and wheezing  Cardiovascular: Negative for chest pain and leg swelling  Gastrointestinal: Negative for abdominal distention, abdominal pain, blood in stool, constipation, diarrhea, nausea and vomiting  Endocrine: Negative for polydipsia  Genitourinary: Negative for difficulty urinating, dyspareunia, dysuria, frequency, hematuria, menstrual problem, pelvic pain, urgency, vaginal bleeding, vaginal discharge and vaginal pain  Musculoskeletal: Negative for arthralgias and back pain  Allergic/Immunologic: Negative for environmental allergies  Neurological: Negative for dizziness, seizures and headaches  Psychiatric/Behavioral: Negative for dysphoric mood and sleep disturbance  The patient is not nervous/anxious  The following portions of the patient's history were reviewed and updated as appropriate: allergies, current medications, past family history, past medical history, past social history, past surgical history and problem list          OB History        1    Para   1    Term   1            AB        Living   1       SAB        IAB        Ectopic        Multiple        Live Births   1           Obstetric Comments   : 38  F             Past Medical History:   Diagnosis Date    Anxiety     Arthritis     Asthma     Cancer ([facility] [address] )     Skin    basal cell carcinoma    Fibroid     20 plus years ago   one in each breast    GERD (gastroesophageal reflux disease)     Irritable bowel syndrome     Migraine     Occasional    PONV (postoperative nausea and vomiting)  Skin cancer     Urinary tract infection     Occasionally       Past Surgical History:   Procedure Laterality Date    BREAST BIOPSY      20 plus years ago   fibroids    BREAST CYST ASPIRATION Bilateral     BREAST SURGERY Bilateral     cyst removal    COLONOSCOPY      COLPOSCOPY  2019    [place] ESOPHAGOGASTRODUODENOSCOPY TRANSORAL DIAGNOSTIC N/A 1/2/2018    Procedure: EGD AND COLONOSCOPY;  Surgeon: Paty Bui MD;  [facility]; Service: Gastroenterology    TONSILLECTOMY      WISDOM TOOTH EXTRACTION         Family History   Problem Relation Age of Onset    Breast cancer Mother         dx in 57's    Other Mother         stenosis    Osteoporosis Mother     Scleroderma Father     Breast cancer Maternal Grandmother         dx in [de-identified] Other Family         back disorder    No Known Problems Daughter     Lung cancer Maternal Aunt     No Known Problems Maternal Grandfather     No Known Problems Paternal Grandmother     No Known Problems Paternal Grandfather     No Known Problems Son     No Known Problems Maternal Aunt        Social History     Socioeconomic History    Marital status: /Civil Union     Spouse name: Not on file    Number of children: Not on file    Years of education: Not on file    Highest education level: Not on file   Occupational History    Not on file   Tobacco Use    Smoking status: Never    Smokeless tobacco: Never   Vaping Use    Vaping Use: Never used   Substance and Sexual Activity    Alcohol use:  Yes     Alcohol/week: 10 0 standard drinks     Types: 10 Glasses of wine per week     Comment: daily    Drug use: No    Sexual activity: Yes     Partners: Male   Other Topics Concern    Not on file   Social History Narrative    Drinks coffee daily     Drinks cola daily    Drinks tea daily     Social Determinants of Health     Financial Resource Strain: Not on file   Food Insecurity: Not on file   Transportation Needs: Not on file   Physical Activity: Not on file   Stress: Not on file   Social Connections: Not on file   Intimate Partner Violence: Not on file   Housing Stability: Not on file         Current Outpatient Medications:     ALPRAZolam (XANAX) 0 25 mg tablet, Take 1 tablet (0 25 mg total) by mouth 3 (three) times a day as needed for anxiety, Disp: 60 tablet, Rfl: 0    cetirizine (ZyrTEC) 10 mg tablet, Take by mouth, Disp: , Rfl:     Cholecalciferol (VITAMIN D3) 2000 units TABS, Take 1 tablet by mouth daily, Disp: , Rfl:     citalopram (CeleXA) 40 mg tablet, TAKE 1 TABLET BY MOUTH EVERY DAY, Disp: 90 tablet, Rfl: 3    clindamycin (CLEOCIN T) 1 % lotion, APPLY TO RASH AREAS TWICE A DAY, Disp: , Rfl:     estradiol (ESTRACE VAGINAL) 0 1 mg/g vaginal cream, Place pea sized amount PV q hs x 2 weeks; then place pea sized amount PV 2-3x/week for maintenance, Disp: 42 5 g, Rfl: 0    Ibuprofen (MOTRIN PO), Take by mouth, Disp: , Rfl:     norethindrone-ethinyl estradiol (FEMHRT LOW DOSE) 0 5-2 5 MG-MCG per tablet, TAKE 1 TABLET BY MOUTH EVERY DAY, Disp: 84 tablet, Rfl: 1    pantoprazole (PROTONIX) 40 mg tablet, TAKE 1 TABLET BY MOUTH 2 TIMES DAILY, Disp: 60 tablet, Rfl: 3    Sodium Fluoride 5000 PPM 1 1 % PSTE, USE AS DIRECTED EVERY DAY AT BEDTIME, Disp: , Rfl:     Naproxen Sodium 220 MG CAPS, Take by mouth (Patient not taking: Reported on 8/9/2022), Disp: , Rfl:     No Known Allergies    Objective   Vitals:    05/04/23 0955   BP: 138/80   BP Location: Left arm   Patient Position: Sitting   Cuff Size: Standard   Weight: 62 6 kg (138 lb)     Physical Exam  Vitals reviewed  Constitutional:       General: She is awake  She is not in acute distress  Appearance: Normal appearance  She is well-developed and well-groomed  She is not ill-appearing, toxic-appearing or diaphoretic  HENT:      Head: Normocephalic and atraumatic     Eyes:      Conjunctiva/sclera: Conjunctivae normal    Neck:      Thyroid: No thyroid mass, thyromegaly or thyroid tenderness  Cardiovascular:      Rate and Rhythm: Normal rate and regular rhythm  Heart sounds: Normal heart sounds  No murmur heard  Pulmonary:      Effort: Pulmonary effort is normal  No tachypnea, bradypnea or respiratory distress  Breath sounds: Normal breath sounds  No stridor or decreased air movement  No wheezing  Chest:   Breasts:     Breasts are symmetrical       Right: Normal  No swelling, bleeding, inverted nipple, mass, nipple discharge, skin change or tenderness  Left: Normal  No swelling, bleeding, inverted nipple, mass, nipple discharge, skin change or tenderness  Abdominal:      General: There is no distension  Palpations: Abdomen is soft  There is no hepatomegaly, splenomegaly or mass  Tenderness: There is no abdominal tenderness  Hernia: No hernia is present  There is no hernia in the left inguinal area or right inguinal area  Genitourinary:     General: Normal vulva  Exam position: Supine  Pubic Area: No rash or pubic lice  Labia:         Right: No rash, tenderness, lesion or injury  Left: No rash, tenderness, lesion or injury  Urethra: No prolapse, urethral pain, urethral swelling or urethral lesion  Vagina: Normal  No signs of injury and foreign body  No vaginal discharge, erythema, tenderness, bleeding, lesions or prolapsed vaginal walls  Cervix: No cervical motion tenderness, discharge, friability, lesion, erythema or cervical bleeding  Uterus: Not deviated, not enlarged, not fixed, not tender and no uterine prolapse  Adnexa:         Right: No mass, tenderness or fullness  Left: No mass, tenderness or fullness  Rectum: Normal  Guaiac result negative  No mass, tenderness, anal fissure, external hemorrhoid or internal hemorrhoid  Normal anal tone  Comments: (+) moderate vaginal atrophy noted and discomfort with pelvic exam  Lymphadenopathy:      Cervical: No cervical adenopathy  Upper Body:      Right upper body: No supraclavicular or axillary adenopathy  Left upper body: No supraclavicular or axillary adenopathy  Lower Body: No right inguinal adenopathy  No left inguinal adenopathy  Skin:     General: Skin is warm and dry  Neurological:      Mental Status: She is alert and oriented to person, place, and time  Psychiatric:         Mood and Affect: Mood and affect normal          Speech: Speech normal          Behavior: Behavior normal  Behavior is cooperative  Thought Content:  Thought content normal          Judgment: Judgment normal

## 2023-05-04 NOTE — ASSESSMENT & PLAN NOTE
I reviewed the pathophysiology of vaginal atrophy  She has tried without much relief OTC remedies like coconut oil, Replens, KY jelly  Desires something more - discussed vaginal estrogen replacement cream  Currently on low dose Femhrt for vasomotor symptoms - takes every other day with positive relief  Tried to d/c but hot flashes returned  Discussed using vaginal estrogen on the days she is not taking her oral HRT  Will plan estrace vaginal cream - pea sized amount PV q hs x 2 weeks, then 2-3 times per week  Encourage her to continue with coconut oil or KY jelly during intercourse  Discussed risks of HRT including but not limited to VTE, heart attacks, strokes and breast cancer risk with long term use  All questions were answered to her satisfaction  RTO in 2 months to follow-up on vaginal HRT start-up  Patient to contact office when needs refill for oral HRT

## 2023-05-09 DIAGNOSIS — F41.9 ANXIETY: ICD-10-CM

## 2023-05-10 ENCOUNTER — TELEPHONE (OUTPATIENT)
Dept: OBGYN CLINIC | Facility: CLINIC | Age: 58
End: 2023-05-10

## 2023-05-10 DIAGNOSIS — F41.9 ANXIETY: ICD-10-CM

## 2023-05-10 RX ORDER — ALPRAZOLAM 0.25 MG/1
0.25 TABLET ORAL 3 TIMES DAILY PRN
Qty: 60 TABLET | Refills: 0 | Status: SHIPPED | OUTPATIENT
Start: 2023-05-10

## 2023-05-10 RX ORDER — ALPRAZOLAM 0.25 MG/1
0.25 TABLET ORAL 3 TIMES DAILY PRN
Qty: 60 TABLET | Refills: 0 | Status: SHIPPED | OUTPATIENT
Start: 2023-05-10 | End: 2023-05-10 | Stop reason: SDUPTHER

## 2023-05-10 RX ORDER — ALPRAZOLAM 0.25 MG/1
0.5 TABLET ORAL
Qty: 30 TABLET | Refills: 0 | Status: CANCELLED | OUTPATIENT
Start: 2023-05-10

## 2023-05-10 NOTE — TELEPHONE ENCOUNTER
1  [id number] 03/20/2023 03/20/2023 ALPRAZolam (Tablet)  60 0 20 0 25 MG NA MELISSA BROADT  [employer]  [employer] 0 / 0 [address]    1  [id number] 03/17/2023 03/16/2023 Quviviq (Tablet)  30 0 30 50 MG NA MELISSA BROADT  [address]  [employer] 0 / 0 [address]    1  [id number] 01/18/2023 01/18/2023 ALPRAZolam (Tablet)  60 0 20 0 25 MG NA MELISSA BROADT  [employer]    [employer] [facility] [facility] 0 / 0 [place]

## 2023-05-10 NOTE — TELEPHONE ENCOUNTER
Patient called to make sure that her comment was noted about the dose for her Alprazolam and that it gets changed so she does not need to take 2 tablets and then run out of the medication before it's due to be refilled

## 2023-06-13 DIAGNOSIS — F41.9 ANXIETY: ICD-10-CM

## 2023-06-13 NOTE — TELEPHONE ENCOUNTER
1 [id number] 05/10/2023 05/10/2023 ALPRAZolam (Tablet) 60 0 20 0 25 MG NA MELISSA [employer]  [employer] [facility] 0 / 0 [place]    1 [id number] 03/20/2023 03/20/2023 ALPRAZolam (Tablet) 60 0 20 0 25 MG NA MELISSA [employer]  [employer] [facility] 0 / 0 [place]    1 [id number] 03/17/2023 03/16/2023 Quviviq (Tablet) 30 0 30 50 MG NA MELISSA BROADT [address]  [employer] [facility] 0 / 0 [place]    1 [id number] 01/18/2023 01/18/2023 ALPRAZolam (Tablet) 60 0 20 0 25 MG NA MELISSA BROADT [employer]  [employer] 0 / 0 [place]    1 [id number] 12/27/2022 11/25/2022 Temazepam (Capsule) 30 0 30 15 MG NA MELISSA BROADT [address]  [employer] [facility] 1 / 2 [place]    1 [id number] 11/27/2022 11/25/2022 Temazepam (Capsule) 30 0 30 15 MG NA MELISSA BROADT [address]  [employer] [facility] 0 / 2 [place]    1 [id number] 11/18/2022 11/18/2022 Temazepam (Capsule) 10 0 10 15 MG NA MELISSA BROADT [address] [employer] [facility] 0 / 0 [place]    1 [id number] 11/04/2022 11/04/2022 clonazePAM (Tablet) 30 0 30 0 5 MG NA MELISSA [employer]  [facility] 0 / 3 [place]    1 [id number] 10/24/2022 09/23/2022 ALPRAZolam (Tablet) 30 0 30 0 5 MG NA MELISSA BROADT [address]  [employer] [facility] 1 / 1 [place]    1 [id number] 09/23/2022 09/23/2022 ALPRAZolam (Tablet) 30 0 30 0 5 MG NA MELISSA BROADT [address]   [employer] 0 / 1 [place]

## 2023-06-14 RX ORDER — ALPRAZOLAM 0.25 MG/1
0.25 TABLET ORAL 3 TIMES DAILY PRN
Qty: 60 TABLET | Refills: 0 | Status: SHIPPED | OUTPATIENT
Start: 2023-06-14

## 2023-06-28 DIAGNOSIS — K21.9 GASTROESOPHAGEAL REFLUX DISEASE WITHOUT ESOPHAGITIS: ICD-10-CM

## 2023-06-28 RX ORDER — PANTOPRAZOLE SODIUM 40 MG/1
TABLET, DELAYED RELEASE ORAL
Qty: 60 TABLET | Refills: 0 | Status: SHIPPED | OUTPATIENT
Start: 2023-06-28

## 2023-07-14 DIAGNOSIS — F41.9 ANXIETY: ICD-10-CM

## 2023-07-14 NOTE — TELEPHONE ENCOUNTER
1 [id number] 06/14/2023 06/14/2023 ALPRAZolam (Tablet) 60.0 20 0.25 MG NA MELISSA BROADT [employer]. [employer] 0 / 0 [place]    1 [id number] 05/10/2023 05/10/2023 ALPRAZolam (Tablet) 60.0 20 0.25 MG NA MELISSA BROADT [employer]. [employer] [facility] 0 / 0 [place]    1 [id number] 03/20/2023 03/20/2023 ALPRAZolam (Tablet) 60.0 20 0.25 MG NA MELISSA BROADT [employer]. [employer] 0 / 0 [place] [address] [id number] 03/17/2023 03/16/2023 Quviviq (Tablet) 30.0 30 50 MG NA MELISSA BROADT [employer]. [employer] 0 / 0 [place] [address] [id number] 01/18/2023 01/18/2023 ALPRAZolam (Tablet) 60.0 20 0.25 MG NA MELISSA BROADT [employer]. [employer] 0 / 0 [place] [address] [id number] 12/27/2022 11/25/2022 Temazepam (Capsule) 30.0 30 15 MG NA MELISSA BROADT [employer]. [employer] 1 / 2 [place] [address] [id number] 11/27/2022 11/25/2022 Temazepam (Capsule) 30.0 30 15 MG NA MELISSA BROADT [employer]. [facility] 0 / 2 [place]    1 [id number] 11/18/2022 11/18/2022 Temazepam (Capsule) 10.0 10 15 MG NA MELISSA BROADT [employer] [facility] [employer] 0 / 0 [place] [address] [id number] 11/04/2022 11/04/2022 clonazePAM (Tablet) 30.0 30 0.5 MG NA MELISSA BROADT [employer]. [employer] 0 / 3 [place]    1 [id number] 10/24/2022 09/23/2022 ALPRAZolam (Tablet) 30.0 30 0.5 MG NA MELISSA BROADT [employer]  [employer] [facility] 1 / 1 [place]

## 2023-07-17 RX ORDER — ALPRAZOLAM 0.25 MG/1
0.25 TABLET ORAL 3 TIMES DAILY PRN
Qty: 60 TABLET | Refills: 0 | Status: SHIPPED | OUTPATIENT
Start: 2023-07-17

## 2023-08-03 ENCOUNTER — OFFICE VISIT (OUTPATIENT)
Dept: PAIN MEDICINE | Facility: CLINIC | Age: 58
End: 2023-08-03
Payer: COMMERCIAL

## 2023-08-03 VITALS
SYSTOLIC BLOOD PRESSURE: 136 MMHG | BODY MASS INDEX: 25.24 KG/M2 | WEIGHT: 138 LBS | DIASTOLIC BLOOD PRESSURE: 88 MMHG | HEART RATE: 87 BPM

## 2023-08-03 DIAGNOSIS — M47.816 LUMBAR FACET ARTHROPATHY: ICD-10-CM

## 2023-08-03 DIAGNOSIS — M47.816 LUMBAR SPONDYLOSIS: Primary | ICD-10-CM

## 2023-08-03 PROCEDURE — 99214 OFFICE O/P EST MOD 30 MIN: CPT | Performed by: PHYSICAL MEDICINE & REHABILITATION

## 2023-08-03 NOTE — PROGRESS NOTES
Assessment:  1. Lumbar spondylosis    2. Lumbar facet arthropathy        Plan:  Ms. Marin Warren is a pleasant 57-year-old female who presents to [facility] [place] for follow-up and reevaluation with now worsening low back pain nonradiating that patient reports has been present for several years but has gotten progressively worse over the last few months. This has greatly impacted her quality of life and activities of daily living. During today's evaluation she is demonstrating pain that is likely multifactorial in nature with the majority of her symptoms appear to be generating from the lumbar facets. She did have recent x-rays which does demonstrate and correlate with bilateral lumbar facet arthrosis most notable at L4-L5 and L5-S1. She is already tried conservative measures with home exercises and yoga but cannot tolerate due to the severity of her pain. At this time interventional approaches would be beneficial and warranted. As such we will  1. We will schedule the patient for bilateral L2, L3, L4, L5 medial branch nerve blocks with intention of moving forward towards radiofrequency ablation if there is an appropriate diagnostic response. The initial blocks will be performed with 0.25% bupivacaine and if an appropriate response is obtained upon review of the patient's pain diary, a confirmatory block will be scheduled with 0.75% bupivacaine. In the office today, we reviewed the nature of facet joint pathology in depth using a spine model. We discussed the approach we would use for the injections and provided literature for home review. The patient understands the risks associated with the procedure including bleeding, infection, tissue injury, and allergic reaction and provided verbal informed consent in the office today. My impressions and treatment recommendations were discussed in detail with the patient who verbalized understanding and had no further questions.   Discharge instructions were provided. I personally saw and examined the patient and I agree with the above discussed plan of care. Orders Placed This Encounter   Procedures   • FL spine and pain procedure     Standing Status:   Future     Standing Expiration Date:   8/3/2027     Order Specific Question:   Reason for Exam:     Answer:   Bilateral L2, L3, L4, L5 MBB #1     Order Specific Question:   Is the patient pregnant? Answer:   No     Order Specific Question:   Anticoagulant hold needed? Answer:   No     No orders of the defined types were placed in this encounter. History of Present Illness:  Andria Sigala is a 66 y.o. female who presents for a follow up office visit in regards to Back Pain. The patient’s current symptoms include bilateral low back pain of approximately 11 months duration. Describes symptoms as a constant dull ache, throbbing sensation that is worse in the morning in the evening and currently rates her pain 6 out of 10. Previously treated her neck but have not addressed the low back. I have personally reviewed and/or updated the patient's past medical history, past surgical history, family history, social history, current medications, allergies, and vital signs today. Review of Systems    Patient Active Problem List   Diagnosis   • Allergic rhinitis   • Anxiety disorder   • Bloating   • Bulge of cervical disc without myelopathy   • Cervical paraspinal muscle spasm   • Neck pain   • Esophageal reflux   • Herniated cervical disc   • Lower back pain   • Other fatigue   • Seasonal allergies   • Vaginal dryness   • Weight gain   • Radiculopathy, cervical region   • CLARA (obstructive sleep apnea)   • Vaginal atrophy       Past Medical History:   Diagnosis Date   • Anxiety    • Arthritis    • Asthma    • Cancer (HCC)     Skin. . basal cell carcinoma   • Fibroid     20 plus years ago. .one in each breast   • GERD (gastroesophageal reflux disease)    • Irritable bowel syndrome    • Migraine     Occasional   • PONV (postoperative nausea and vomiting)    • Skin cancer    • Urinary tract infection     Occasionally       Past Surgical History:   Procedure Laterality Date   • BREAST BIOPSY      20 plus years ago. ..fibroids   • BREAST CYST ASPIRATION Bilateral    • BREAST SURGERY Bilateral     cyst removal   • COLONOSCOPY     • COLPOSCOPY  2019   • [place] ESOPHAGOGASTRODUODENOSCOPY TRANSORAL DIAGNOSTIC N/A 1/2/2018    Procedure: EGD AND COLONOSCOPY;  Surgeon: Jeb Escobedo MD;  Location: [facility]; Service: Gastroenterology   • TONSILLECTOMY     • WISDOM TOOTH EXTRACTION         Family History   Problem Relation Age of Onset   • Breast cancer Mother         dx in 63's   • Other Mother         stenosis   • Osteoporosis Mother    • Scleroderma Father    • Breast cancer Maternal Grandmother         dx in 80's   • Other Family         back disorder   • No Known Problems Daughter    • Lung cancer Maternal Aunt    • No Known Problems Maternal Grandfather    • No Known Problems Paternal Grandmother    • No Known Problems Paternal Grandfather    • No Known Problems Son    • No Known Problems Maternal Aunt        Social History     Occupational History   • Not on file   Tobacco Use   • Smoking status: Never   • Smokeless tobacco: Never   Vaping Use   • Vaping Use: Never used   Substance and Sexual Activity   • Alcohol use: Yes     Alcohol/week: 10.0 standard drinks of alcohol     Types: 10 Glasses of wine per week     Comment: daily   • Drug use: No   • Sexual activity: Yes     Partners: Male       Current Outpatient Medications on File Prior to Visit   Medication Sig   • ALPRAZolam (XANAX) 0.25 mg tablet Take 1 tablet (0.25 mg total) by mouth 3 (three) times a day as needed for anxiety (Patient taking differently: Take 0.5 mg by mouth daily at bedtime Patient states that she only takes . 5mg at bedtime.)   • cetirizine (ZyrTEC) 10 mg tablet Take by mouth   • Cholecalciferol (VITAMIN D3) 2000 units TABS Take 1 tablet by mouth daily   • citalopram (CeleXA) 40 mg tablet TAKE 1 TABLET BY MOUTH EVERY DAY   • Ibuprofen (MOTRIN PO) Take by mouth   • pantoprazole (PROTONIX) 40 mg tablet TAKE 1 TABLET BY MOUTH 2 TIMES DAILY   • clindamycin (CLEOCIN T) 1 % lotion APPLY TO RASH AREAS TWICE A DAY   • estradiol (ESTRACE VAGINAL) 0.1 mg/g vaginal cream Place pea sized amount PV q hs x 2 weeks; then place pea sized amount PV 2-3x/week for maintenance   • norethindrone-ethinyl estradiol (FEMHRT LOW DOSE) 0.5-2.5 MG-MCG per tablet TAKE 1 TABLET BY MOUTH EVERY DAY   • Sodium Fluoride 5000 PPM 1.1 % PSTE USE AS DIRECTED EVERY DAY AT BEDTIME   • [DISCONTINUED] Naproxen Sodium 220 MG CAPS Take by mouth (Patient not taking: Reported on 8/9/2022)     No current facility-administered medications on file prior to visit. No Known Allergies    Physical Exam:    /88   Pulse 87   Wt 62.6 kg (138 lb)   BMI 25.24 kg/m²     Constitutional:normal, well developed, well nourished, alert, in no distress and non-toxic and no overt pain behavior.   Eyes:anicteric  HEENT:grossly intact  Neck:supple, symmetric, trachea midline and no masses   Pulmonary:even and unlabored  Cardiovascular:No edema or pitting edema present  Skin:Normal without rashes or lesions and well hydrated  Psychiatric:Mood and affect appropriate  Neurologic:Cranial Nerves II-XII grossly intact  Musculoskeletal:normal, forward flexed posture, tenderness to palpation bilateral lumbar paraspinals, decreased active and passive range of motion with lumbar flexion and extension limited by pain, MMT 5 out of 5 bilateral lower extremities, sensation grossly tact to light touch, DTRs within normal limits  Positive pain to palpation bilateral lumbar facets with axial loading and rotational forces to the right and left    Imaging

## 2023-08-14 ENCOUNTER — TELEPHONE (OUTPATIENT)
Age: 58
End: 2023-08-14

## 2023-08-14 DIAGNOSIS — F41.9 ANXIETY: ICD-10-CM

## 2023-08-14 NOTE — TELEPHONE ENCOUNTER
.Caller: pt    Doctor: Eron Ross    Reason for call: Pt called in stating that she needs to cancel her appt for 8/28/23. Her dog has to have sx.  She will call back to reschedule thank you     Call back#: [phone]

## 2023-08-14 NOTE — TELEPHONE ENCOUNTER
It is not allowing me to change the dosaging    1 [id number] 07/17/2023 07/17/2023 ALPRAZolam (Tablet) 60.0 20 0.25 MG NA MELISSA BROADT [employer]. [employer] 0 / 0 [place]    1 [id number] 06/14/2023 06/14/2023 ALPRAZolam (Tablet) 60.0 20 0.25 MG NA MELISSA BROADT [employer], [email or web address]. [employer] [facility] 0 / 0 [place]    1 [id number] 05/10/2023 05/10/2023 ALPRAZolam (Tablet) 60.0 20 0.25 MG NA MELISSA BROADT [employer] 0 / 0 [place]    1 [id number] 03/20/2023 03/20/2023 ALPRAZolam (Tablet) 60.0 20 0.25 MG NA MELISSA BROADT [employer], [email or web address] 0 / 0 [place]    1 [id number] 03/17/2023 03/16/2023 Quviviq (Tablet) 30.0 30 50 MG NA MELISSA BROADT [employer]. [employer] 0 / 0 [place]    1 [id number] 01/18/2023 01/18/2023 ALPRAZolam (Tablet) 60.0 20 0.25 MG NA MELISSA BROADT [employer], [email or web address].  [employer] 0 / 0 [place]

## 2023-08-15 RX ORDER — ALPRAZOLAM 0.25 MG/1
0.25 TABLET ORAL 3 TIMES DAILY PRN
Qty: 60 TABLET | Refills: 0 | Status: SHIPPED | OUTPATIENT
Start: 2023-08-15

## 2023-08-22 ENCOUNTER — HOSPITAL ENCOUNTER (OUTPATIENT)
Dept: RADIOLOGY | Facility: HOSPITAL | Age: 58
Discharge: HOME | End: 2023-08-22
Payer: COMMERCIAL

## 2023-08-22 DIAGNOSIS — Z12.31 ENCOUNTER FOR SCREENING MAMMOGRAM FOR MALIGNANT NEOPLASM OF BREAST: ICD-10-CM

## 2023-08-22 DIAGNOSIS — R92.2 DENSE BREAST TISSUE ON MAMMOGRAM: ICD-10-CM

## 2023-08-22 PROCEDURE — 77067 SCR MAMMO BI INCL CAD: CPT

## 2023-08-22 PROCEDURE — 77063 BREAST TOMOSYNTHESIS BI: CPT

## 2023-08-22 PROCEDURE — 76641 ULTRASOUND BREAST COMPLETE: CPT

## 2023-08-25 DIAGNOSIS — K21.9 GASTROESOPHAGEAL REFLUX DISEASE WITHOUT ESOPHAGITIS: ICD-10-CM

## 2023-08-25 RX ORDER — PANTOPRAZOLE SODIUM 40 MG/1
TABLET, DELAYED RELEASE ORAL
Qty: 60 TABLET | Refills: 0 | Status: SHIPPED | OUTPATIENT
Start: 2023-08-25

## 2023-09-06 NOTE — TELEPHONE ENCOUNTER
Caller: patient    Doctor: Derik Coates  Reason for call: calling to reschedule procedure, transfer to Schedule    Call back#:

## 2023-09-14 DIAGNOSIS — F41.9 ANXIETY: ICD-10-CM

## 2023-09-15 RX ORDER — ALPRAZOLAM 0.25 MG/1
0.25 TABLET ORAL 3 TIMES DAILY PRN
Qty: 60 TABLET | Refills: 0 | Status: SHIPPED | OUTPATIENT
Start: 2023-09-15

## 2023-10-12 DIAGNOSIS — F41.9 ANXIETY: ICD-10-CM

## 2023-10-12 NOTE — TELEPHONE ENCOUNTER
[id number] 09/15/2023 09/15/2023 ALPRAZolam (Tablet) 60.0 20 0.25 MG NA MELISSA BROADT [employer], [employer]. [employer] 0 / 0 [place] [address] [id number] 08/15/2023 08/15/2023 ALPRAZolam (Tablet) 60.0 20 0.25 MG NA MELISSA BROADT [employer]. [employer] 0 / 0 [place] [address] [id number] 07/17/2023 07/17/2023 ALPRAZolam (Tablet) 60.0 20 0.25 MG NA MELISSA BROADT [employer] 0 / 0 [place] [address] [id number] 06/14/2023 06/14/2023 ALPRAZolam (Tablet) 60.0 20 0.25 MG NA MELISSA BROADT [employer] 0 / 0 [place] [address] [id number] 05/10/2023 05/10/2023 ALPRAZolam (Tablet) 60.0 20 0.25 MG NA MELISSA BROADT [employer]. [employer] 0 / 0 [place] [address] [id number] 03/20/2023 03/20/2023 ALPRAZolam (Tablet) 60.0 20 0.25 MG NA MELISSA BROADT [employer] [facility] 0 / 0 [place] [address] [id number] 03/17/2023 03/16/2023 Quviviq (Tablet) 30.0 30 50 MG NA MELISSA BROADT [employer]. [employer] 0 / 0 [place] [address] [id number] 01/18/2023 01/18/2023 ALPRAZolam (Tablet) 60.0 20 0.25 MG NA MELISSA BROADT [employer] 0 / 0 [place] [address] [id number] 12/27/2022 11/25/2022 Temazepam (Capsule) 30.0 30 15 MG NA MELISSA BROADT [employer] 1 / 2 [place]    1 [id number] 11/27/2022 11/25/2022 Temazepam (Capsule) 30.0 30 15 MG NA MELISSA Stacy.  [employer] 0 / 2 [place]

## 2023-10-13 RX ORDER — ALPRAZOLAM 0.25 MG/1
0.25 TABLET ORAL 3 TIMES DAILY PRN
Qty: 60 TABLET | Refills: 0 | Status: SHIPPED | OUTPATIENT
Start: 2023-10-13

## 2023-10-24 ENCOUNTER — HOSPITAL ENCOUNTER (OUTPATIENT)
Dept: RADIOLOGY | Facility: HOSPITAL | Age: 58
Discharge: HOME | End: 2023-10-24
Admitting: PHYSICAL MEDICINE & REHABILITATION
Payer: COMMERCIAL

## 2023-10-24 VITALS
RESPIRATION RATE: 18 BRPM | OXYGEN SATURATION: 99 % | SYSTOLIC BLOOD PRESSURE: 113 MMHG | TEMPERATURE: 97.6 F | HEART RATE: 68 BPM | DIASTOLIC BLOOD PRESSURE: 72 MMHG

## 2023-10-24 DIAGNOSIS — M47.816 LUMBAR FACET ARTHROPATHY: ICD-10-CM

## 2023-10-24 DIAGNOSIS — M47.816 LUMBAR SPONDYLOSIS: ICD-10-CM

## 2023-10-24 PROCEDURE — 64494 INJ PARAVERT F JNT L/S 2 LEV: CPT | Performed by: PHYSICAL MEDICINE & REHABILITATION

## 2023-10-24 PROCEDURE — 64493 INJ PARAVERT F JNT L/S 1 LEV: CPT | Performed by: PHYSICAL MEDICINE & REHABILITATION

## 2023-10-24 RX ORDER — BUPIVACAINE HCL/PF 2.5 MG/ML
3 VIAL (ML) INJECTION ONCE
Status: COMPLETED | OUTPATIENT
Start: 2023-10-24 | End: 2023-10-24

## 2023-10-24 RX ADMIN — BUPIVACAINE HYDROCHLORIDE 3 ML: 2.5 INJECTION, SOLUTION EPIDURAL; INFILTRATION; INTRACAUDAL at 13:58

## 2023-10-24 NOTE — DISCHARGE INSTR - LAB

## 2023-10-24 NOTE — H&P
History of Present Illness: The patient is a 66 y.o. female who presents with complaints of LOW BACK PAIN    Past Medical History:   Diagnosis Date    Anxiety     Arthritis     Asthma     Cancer ([address])     Skin. . basal cell carcinoma    Fibroid     20 plus years ago. .one in each breast    GERD (gastroesophageal reflux disease)     Irritable bowel syndrome     Migraine     Occasional    PONV (postoperative nausea and vomiting)     Skin cancer     Urinary tract infection     Occasionally       Past Surgical History:   Procedure Laterality Date    BREAST BIOPSY      20 plus years ago. ..fibroids    BREAST CYST ASPIRATION Bilateral     BREAST SURGERY Bilateral     cyst removal    COLONOSCOPY      COLPOSCOPY  2019    [place] ESOPHAGOGASTRODUODENOSCOPY TRANSORAL DIAGNOSTIC N/A 1/2/2018    Procedure: EGD AND COLONOSCOPY;  Surgeon: Norah Corea MD;  [facility];   Service: Gastroenterology    TONSILLECTOMY      WISDOM TOOTH EXTRACTION           Current Outpatient Medications:     ALPRAZolam (XANAX) 0.25 mg tablet, Take 1 tablet (0.25 mg total) by mouth 3 (three) times a day as needed for anxiety, Disp: 60 tablet, Rfl: 0    cetirizine (ZyrTEC) 10 mg tablet, Take by mouth, Disp: , Rfl:     Cholecalciferol (VITAMIN D3) 2000 units TABS, Take 1 tablet by mouth daily, Disp: , Rfl:     citalopram (CeleXA) 40 mg tablet, TAKE 1 TABLET BY MOUTH EVERY DAY, Disp: 90 tablet, Rfl: 3    clindamycin (CLEOCIN T) 1 % lotion, APPLY TO RASH AREAS TWICE A DAY, Disp: , Rfl:     estradiol (ESTRACE VAGINAL) 0.1 mg/g vaginal cream, Place pea sized amount PV q hs x 2 weeks; then place pea sized amount PV 2-3x/week for maintenance, Disp: 42.5 g, Rfl: 0    Ibuprofen (MOTRIN PO), Take by mouth, Disp: , Rfl:     norethindrone-ethinyl estradiol (FEMHRT LOW DOSE) 0.5-2.5 MG-MCG per tablet, TAKE 1 TABLET BY MOUTH EVERY DAY, Disp: 84 tablet, Rfl: 1    pantoprazole (PROTONIX) 40 mg tablet, TAKE 1 TABLET BY MOUTH TWO TIMES DAILY, Disp: 60 tablet, Rfl: 0    Sodium Fluoride 5000 PPM 1.1 % PSTE, USE AS DIRECTED EVERY DAY AT BEDTIME, Disp: , Rfl:     Current Facility-Administered Medications:     bupivacaine (PF) (MARCAINE) 0.25 % injection 3 mL, 3 mL, Perineural, Once, Teola Norton, DO    No Known Allergies    Physical Exam: There were no vitals filed for this visit. General: Awake, Alert, Oriented x 3, Mood and affect appropriate  Respiratory: Respirations even and unlabored  Cardiovascular: Peripheral pulses intact; no edema  Musculoskeletal Exam: Tenderness to palpation bilateral lumbar paraspinals    ASA Score: 2       Assessment:   1. Lumbar facet arthropathy    2.  Lumbar spondylosis        Plan: Bilateral L2, L3, L4 MBB #1

## 2023-10-26 ENCOUNTER — TELEPHONE (OUTPATIENT)
Dept: PAIN MEDICINE | Facility: CLINIC | Age: 58
End: 2023-10-26

## 2023-10-26 DIAGNOSIS — K21.9 GASTROESOPHAGEAL REFLUX DISEASE WITHOUT ESOPHAGITIS: ICD-10-CM

## 2023-10-26 NOTE — TELEPHONE ENCOUNTER
----- Message from Kaylin Welsh DO sent at 10/25/2023  1:50 PM EDT -----  Regarding: FW: Pain log  Contact: [phone]  Pain diary reviewed  Okay to proceed with MBB #2  Thank you  ----- Message -----  From: Suni Schroeder RN  Sent: 10/25/2023   9:47 AM EDT  To: Kaylin Welsh DO  Subject: FW: Pain log                                     Please see pain diary and advise-Thank you  ----- Message -----  From: Tonny Downing RN  Sent: 10/25/2023   9:32 AM EDT  To: [facility]  Subject: FW: Pain log                                       ----- Message -----  From: Nolan Farias  Sent: 10/25/2023   8:17 AM EDT  To: [facility]  Subject: Pain log                                         I felt great yesterday afternoon and evening, some pain states returning as I was sleeping.

## 2023-10-27 RX ORDER — PANTOPRAZOLE SODIUM 40 MG/1
TABLET, DELAYED RELEASE ORAL
Qty: 60 TABLET | Refills: 0 | Status: SHIPPED | OUTPATIENT
Start: 2023-10-27

## 2023-11-09 DIAGNOSIS — F32.A DEPRESSION, UNSPECIFIED DEPRESSION TYPE: ICD-10-CM

## 2023-11-09 RX ORDER — CITALOPRAM 40 MG/1
TABLET ORAL
Qty: 90 TABLET | Refills: 0 | Status: SHIPPED | OUTPATIENT
Start: 2023-11-09

## 2023-11-13 ENCOUNTER — HOSPITAL ENCOUNTER (OUTPATIENT)
Dept: RADIOLOGY | Facility: CLINIC | Age: 58
Discharge: HOME | End: 2023-11-13
Admitting: PHYSICAL MEDICINE & REHABILITATION
Payer: COMMERCIAL

## 2023-11-13 VITALS
RESPIRATION RATE: 16 BRPM | SYSTOLIC BLOOD PRESSURE: 124 MMHG | HEART RATE: 80 BPM | TEMPERATURE: 98.2 F | OXYGEN SATURATION: 95 % | DIASTOLIC BLOOD PRESSURE: 69 MMHG

## 2023-11-13 DIAGNOSIS — M47.816 LUMBAR SPONDYLOSIS: ICD-10-CM

## 2023-11-13 PROCEDURE — 64494 INJ PARAVERT F JNT L/S 2 LEV: CPT | Performed by: PHYSICAL MEDICINE & REHABILITATION

## 2023-11-13 PROCEDURE — 64493 INJ PARAVERT F JNT L/S 1 LEV: CPT | Performed by: PHYSICAL MEDICINE & REHABILITATION

## 2023-11-13 RX ORDER — BUPIVACAINE HYDROCHLORIDE 7.5 MG/ML
3 INJECTION, SOLUTION EPIDURAL; RETROBULBAR ONCE
Status: COMPLETED | OUTPATIENT
Start: 2023-11-13 | End: 2023-11-13

## 2023-11-13 RX ORDER — BUPIVACAINE HCL/PF 2.5 MG/ML
3 VIAL (ML) INJECTION ONCE
Status: DISCONTINUED | OUTPATIENT
Start: 2023-11-13 | End: 2023-11-13

## 2023-11-13 RX ADMIN — BUPIVACAINE HYDROCHLORIDE 3 ML: 7.5 INJECTION, SOLUTION EPIDURAL; RETROBULBAR at 13:32

## 2023-11-13 NOTE — H&P
History of Present Illness: The patient is a 66 y.o. female who presents with complaints of low back pain    Past Medical History:   Diagnosis Date    Anxiety     Arthritis     Asthma     Cancer ([address])     Skin. . basal cell carcinoma    Fibroid     20 plus years ago. .one in each breast    GERD (gastroesophageal reflux disease)     Irritable bowel syndrome     Migraine     Occasional    PONV (postoperative nausea and vomiting)     Skin cancer     Urinary tract infection     Occasionally       Past Surgical History:   Procedure Laterality Date    BREAST BIOPSY      20 plus years ago. ..fibroids    BREAST CYST ASPIRATION Bilateral     BREAST SURGERY Bilateral     cyst removal    COLONOSCOPY      COLPOSCOPY  2019    [place] ESOPHAGOGASTRODUODENOSCOPY TRANSORAL DIAGNOSTIC N/A 1/2/2018    Procedure: EGD AND COLONOSCOPY;  Surgeon: Ck Grossman MD;  [facility];   Service: Gastroenterology    TONSILLECTOMY      WISDOM TOOTH EXTRACTION           Current Outpatient Medications:     ALPRAZolam (XANAX) 0.25 mg tablet, Take 1 tablet (0.25 mg total) by mouth 3 (three) times a day as needed for anxiety, Disp: 60 tablet, Rfl: 0    cetirizine (ZyrTEC) 10 mg tablet, Take by mouth, Disp: , Rfl:     Cholecalciferol (VITAMIN D3) 2000 units TABS, Take 1 tablet by mouth daily, Disp: , Rfl:     citalopram (CeleXA) 40 mg tablet, TAKE 1 TABLET BY MOUTH EVERY DAY, Disp: 90 tablet, Rfl: 0    clindamycin (CLEOCIN T) 1 % lotion, APPLY TO RASH AREAS TWICE A DAY, Disp: , Rfl:     estradiol (ESTRACE VAGINAL) 0.1 mg/g vaginal cream, Place pea sized amount PV q hs x 2 weeks; then place pea sized amount PV 2-3x/week for maintenance, Disp: 42.5 g, Rfl: 0    Ibuprofen (MOTRIN PO), Take by mouth, Disp: , Rfl:     norethindrone-ethinyl estradiol (FEMHRT LOW DOSE) 0.5-2.5 MG-MCG per tablet, TAKE 1 TABLET BY MOUTH EVERY DAY, Disp: 84 tablet, Rfl: 1    pantoprazole (PROTONIX) 40 mg tablet, TAKE 1 TABLET BY MOUTH TWO TIMES DAILY, Disp: 60 tablet, Rfl: 0    Sodium Fluoride 5000 PPM 1.1 % PSTE, USE AS DIRECTED EVERY DAY AT BEDTIME, Disp: , Rfl:     Current Facility-Administered Medications:     bupivacaine (PF) (MARCAINE) 0.75 % injection 3 mL, 3 mL, Other, Once, Carlyon Cools, DO    No Known Allergies    Physical Exam:   Vitals:    11/13/23 1309   BP: 144/82   Pulse: 93   Resp: 16   Temp: 98.2 °F (36.8 °C)   SpO2: 97%     General: Awake, Alert, Oriented x 3, Mood and affect appropriate  Respiratory: Respirations even and unlabored  Cardiovascular: Peripheral pulses intact; no edema  Musculoskeletal Exam: Tenderness palpation bilateral lumbar paraspinals    ASA Score: 2    Patient/Chart Verification  Patient ID Verified: Verbal  ID Band Applied: No  Consents Confirmed: Procedural, To be obtained in the Pre-Procedure area  H&P( within 30 days) Verified: To be obtained in the Pre-Procedure area  Allergies Reviewed: Yes  Anticoag/NSAID held?: No  Currently on antibiotics?: No    Assessment:   1.  Lumbar spondylosis        Plan: Bilateral L2, L3, L4 MBB #2

## 2023-11-13 NOTE — DISCHARGE INSTR - LAB

## 2023-11-14 ENCOUNTER — TELEPHONE (OUTPATIENT)
Dept: PAIN MEDICINE | Facility: CLINIC | Age: 58
End: 2023-11-14

## 2023-11-14 DIAGNOSIS — F41.9 ANXIETY: ICD-10-CM

## 2023-11-14 RX ORDER — ALPRAZOLAM 0.25 MG/1
0.25 TABLET ORAL 3 TIMES DAILY PRN
Qty: 60 TABLET | Refills: 0 | Status: SHIPPED | OUTPATIENT
Start: 2023-11-14

## 2023-11-14 NOTE — TELEPHONE ENCOUNTER
----- Message from Vanna Mancuso DO sent at 11/14/2023  9:21 AM EST -----  Regarding: FW: Pain log  Contact: [phone]  Pain diary reviewed and results look good  Okay to proceed with RFA's  Thank you  ----- Message -----  From: Ghazala Nowak RN  Sent: 11/14/2023   8:53 AM EST  To: Vanna Mancuso DO  Subject: FW: Pain log                                       ----- Message -----  From: Rylee Scott RN  Sent: 11/14/2023   8:42 AM EST  To: [facility]  Subject: FW: Pain log                                       ----- Message -----  From: Haroldo Talbert  Sent: 11/14/2023   8:41 AM EST  To: [facility]  Subject: Pain log                                         Pain log from 11/13

## 2023-11-14 NOTE — TELEPHONE ENCOUNTER
1 [id number] 10/13/2023 10/13/2023 ALPRAZolam (Tablet) 60.0 20 0.25 MG NA MELISSA BROADT [employer] [facility] 0 / 0 [place]    1 [id number] 09/15/2023 09/15/2023 ALPRAZolam (Tablet) 60.0 20 0.25 MG NA MELISSA BROADT [employer] [facility] / 0 [place]    1 [id number] 08/15/2023 08/15/2023 ALPRAZolam (Tablet) 60.0 20 0.25 MG NA [facility]SA [facility]T [facility] [facility] [place]    1 [id number] 07/17/2023 07/17/2023 ALPRAZolam (Tablet) 60.0 20 0.25 MG NA MELISSA BROADT [facility] [facility] [place]    1 [id number] 06/14/2023 06/14/2023 ALPRAZolam (Tablet) 60.0 20 0.25 MG NA MELISSA BROADT [facility] [facility] 0 [place] [address] [id number] 05/10/2023 05/10/2023 ALPRAZolam (Tablet) 60.0 20 0.25 MG NA MELISSA BROADT [employer]. [facility] 0 / 0 [place] [address] [id number] 03/20/2023 03/20/2023 ALPRAZolam (Tablet) 60.0 20 0.25 MG NA MELISSA BROADT [facility] [facility] / 0 [place] [address] [id number] 03/17/2023 03/16/2023 Quviviq (Tablet) 30.0 30 50 MG NA MELISSA BROADT [employer]. [facility] 0 / 0 [place]    1 [id number] 01/18/2023 01/18/2023 ALPRAZolam (Tablet) 60.0 20 0.25 MG NA MELISSA BROADT [employer] [facility] 0 [place] [address] [id number] 12/27/2022 11/25/2022 Temazepam (Capsule) 30.0 30 15 MG NA MELISSA Stacy.  [employer] 1 / 2 [place]

## 2023-12-11 ENCOUNTER — HOSPITAL ENCOUNTER (OUTPATIENT)
Dept: RADIOLOGY | Facility: CLINIC | Age: 58
Discharge: HOME | End: 2023-12-11
Payer: COMMERCIAL

## 2023-12-11 ENCOUNTER — TELEPHONE (OUTPATIENT)
Dept: PAIN MEDICINE | Facility: CLINIC | Age: 58
End: 2023-12-11

## 2023-12-11 VITALS
SYSTOLIC BLOOD PRESSURE: 128 MMHG | HEART RATE: 77 BPM | OXYGEN SATURATION: 96 % | DIASTOLIC BLOOD PRESSURE: 57 MMHG | TEMPERATURE: 98.2 F | RESPIRATION RATE: 18 BRPM

## 2023-12-11 DIAGNOSIS — M47.816 LUMBAR SPONDYLOSIS: ICD-10-CM

## 2023-12-11 PROCEDURE — 64636 DESTROY L/S FACET JNT ADDL: CPT | Performed by: PHYSICAL MEDICINE & REHABILITATION

## 2023-12-11 PROCEDURE — 64635 DESTROY LUMB/SAC FACET JNT: CPT | Performed by: PHYSICAL MEDICINE & REHABILITATION

## 2023-12-11 RX ORDER — BUPIVACAINE HCL/PF 2.5 MG/ML
5 VIAL (ML) INJECTION ONCE
Status: COMPLETED | OUTPATIENT
Start: 2023-12-11 | End: 2023-12-11

## 2023-12-11 RX ORDER — METHYLPREDNISOLONE ACETATE 40 MG/ML
40 INJECTION, SUSPENSION INTRA-ARTICULAR; INTRALESIONAL; INTRAMUSCULAR; PARENTERAL; SOFT TISSUE ONCE
Status: COMPLETED | OUTPATIENT
Start: 2023-12-11 | End: 2023-12-11

## 2023-12-11 RX ORDER — LIDOCAINE HYDROCHLORIDE 10 MG/ML
10 INJECTION, SOLUTION EPIDURAL; INFILTRATION; INTRACAUDAL; PERINEURAL ONCE
Status: COMPLETED | OUTPATIENT
Start: 2023-12-11 | End: 2023-12-11

## 2023-12-11 RX ADMIN — LIDOCAINE HYDROCHLORIDE 6 ML: 10 INJECTION, SOLUTION EPIDURAL; INFILTRATION; INTRACAUDAL; PERINEURAL at 11:04

## 2023-12-11 RX ADMIN — METHYLPREDNISOLONE ACETATE 40 MG: 40 INJECTION, SUSPENSION INTRA-ARTICULAR; INTRALESIONAL; INTRAMUSCULAR; PARENTERAL; SOFT TISSUE at 11:12

## 2023-12-11 RX ADMIN — BUPIVACAINE HYDROCHLORIDE 3 ML: 2.5 INJECTION, SOLUTION EPIDURAL; INFILTRATION; INTRACAUDAL at 11:11

## 2023-12-11 NOTE — DISCHARGE INSTR - LAB

## 2023-12-11 NOTE — H&P
History of Present Illness: The patient is a 66 y.o. female who presents with complaints of low back pain    Past Medical History:   Diagnosis Date    Anxiety     Arthritis     Asthma     Cancer ([address])     Skin. . basal cell carcinoma    Fibroid     20 plus years ago. .one in each breast    GERD (gastroesophageal reflux disease)     Irritable bowel syndrome     Migraine     Occasional    PONV (postoperative nausea and vomiting)     Skin cancer     Urinary tract infection     Occasionally       Past Surgical History:   Procedure Laterality Date    BREAST BIOPSY      20 plus years ago. ..fibroids    BREAST CYST ASPIRATION Bilateral     BREAST SURGERY Bilateral     cyst removal    COLONOSCOPY      COLPOSCOPY  2019    [place] ESOPHAGOGASTRODUODENOSCOPY TRANSORAL DIAGNOSTIC N/A 1/2/2018    Procedure: EGD AND COLONOSCOPY;  Surgeon: Opal Rodas MD;  [facility];   Service: Gastroenterology    TONSILLECTOMY      WISDOM TOOTH EXTRACTION           Current Outpatient Medications:     ALPRAZolam (XANAX) 0.25 mg tablet, Take 1 tablet (0.25 mg total) by mouth 3 (three) times a day as needed for anxiety, Disp: 60 tablet, Rfl: 0    cetirizine (ZyrTEC) 10 mg tablet, Take by mouth, Disp: , Rfl:     Cholecalciferol (VITAMIN D3) 2000 units TABS, Take 1 tablet by mouth daily, Disp: , Rfl:     citalopram (CeleXA) 40 mg tablet, TAKE 1 TABLET BY MOUTH EVERY DAY, Disp: 90 tablet, Rfl: 0    clindamycin (CLEOCIN T) 1 % lotion, APPLY TO RASH AREAS TWICE A DAY, Disp: , Rfl:     estradiol (ESTRACE VAGINAL) 0.1 mg/g vaginal cream, Place pea sized amount PV q hs x 2 weeks; then place pea sized amount PV 2-3x/week for maintenance, Disp: 42.5 g, Rfl: 0    Ibuprofen (MOTRIN PO), Take by mouth, Disp: , Rfl:     norethindrone-ethinyl estradiol (FEMHRT LOW DOSE) 0.5-2.5 MG-MCG per tablet, TAKE 1 TABLET BY MOUTH EVERY DAY, Disp: 84 tablet, Rfl: 1    pantoprazole (PROTONIX) 40 mg tablet, TAKE 1 TABLET BY MOUTH TWO TIMES DAILY, Disp: 60 tablet, Rfl: 0    Sodium Fluoride 5000 PPM 1.1 % PSTE, USE AS DIRECTED EVERY DAY AT BEDTIME, Disp: , Rfl:     Current Facility-Administered Medications:     bupivacaine (PF) (MARCAINE) 0.25 % injection 5 mL, 5 mL, Perineural, Once, Gomez Real, DO    lidocaine (PF) (XYLOCAINE-MPF) 1 % injection 10 mL, 10 mL, Infiltration, Once, Gomez Real, DO    methylPREDNISolone acetate (DEPO-MEDROL) injection 40 mg, 40 mg, Perineural, Once, Gomez Real, DO    No Known Allergies    Physical Exam:   Vitals:    12/11/23 1044   BP: 144/83   Pulse: 81   Resp: 20   Temp: 98.2 °F (36.8 °C)   SpO2: 97%     General: Awake, Alert, Oriented x 3, Mood and affect appropriate  Respiratory: Respirations even and unlabored  Cardiovascular: Peripheral pulses intact; no edema  Musculoskeletal Exam: Tenderness palpation left side lumbar paraspinals    ASA Score: 2    Patient/Chart Verification  Patient ID Verified: Verbal  ID Band Applied: No  Consents Confirmed: Procedural, To be obtained in the Pre-Procedure area  H&P( within 30 days) Verified: To be obtained in the Pre-Procedure area  Allergies Reviewed: Yes  Anticoag/NSAID held?: NA  Currently on antibiotics?: No    Assessment:   1.  Lumbar spondylosis        Plan: Lt L2-L4 RFA

## 2023-12-12 NOTE — TELEPHONE ENCOUNTER
Pt did well over night no s/s of infection or sunburn sensation. Aware it takes 2 weeks to notice pain relief and 4-6 weeks for full pain effect to be achieved. Aware to medicate as previous for discomfort and may use ice or heat. Confirmed next appt. 1/08/24  Call if any questions or concerns prior to next appt. Current pain level- 5/10 worse with bending.

## 2023-12-21 DIAGNOSIS — F41.9 ANXIETY: ICD-10-CM

## 2023-12-21 RX ORDER — ALPRAZOLAM 0.25 MG/1
0.25 TABLET ORAL 3 TIMES DAILY PRN
Qty: 60 TABLET | Refills: 0 | Status: SHIPPED | OUTPATIENT
Start: 2023-12-21

## 2023-12-21 NOTE — TELEPHONE ENCOUNTER
1 [id number] 11/14/2023 11/14/2023 ALPRAZolam (Tablet) 60.0 20 0.25 MG NA MELISSA [employer] 0 / 0 [place]    1 [id number] 10/13/2023 10/13/2023 ALPRAZolam (Tablet) 60.0 20 0.25 MG NA MELISSA [employer] Commercial [employer] 0 / 0 [place]    1 [id number] 09/15/2023 09/15/2023 ALPRAZolam (Tablet) 60.0 20 0.25 MG NA MELISSA [employer] 0 / 0 [place]    1 [id number] 08/15/2023 08/15/2023 ALPRAZolam (Tablet) 60.0 20 0.25 MG NA MELISSA [employer] 0 / 0 [place]    1 [id number] 07/17/2023 07/17/2023 ALPRAZolam (Tablet) 60.0 20 0.25 MG NA MELISSA [employer] 0 / 0 [place]    1 [id number] 06/14/2023 06/14/2023 ALPRAZolam (Tablet) 60.0 20 0.25 MG NA [employer] [employer] 0 / 0 [place]    1 [id number] 05/10/2023 05/10/2023 ALPRAZolam (Tablet) 60.0 20 0.25 MG NA [employer] [employer] 0 / 0 [place]    1 [id number] 03/20/2023 03/20/2023 ALPRAZolam (Tablet) 60.0 20 0.25 MG NA MELISSA [employer] 0 / 0 [place]    1 [id number] 03/17/2023 03/16/2023 Quviviq (Tablet) 30.0 30 50 MG NA KOWN [employer] 0 / 0 [place]    1 [id number] 01/18/2023 01/18/2023 ALPRAZolam (Tablet) 60.0 20 0.25 MG NA KOWN [employer] 0 / 0 [place]

## 2023-12-26 DIAGNOSIS — K21.9 GASTROESOPHAGEAL REFLUX DISEASE WITHOUT ESOPHAGITIS: ICD-10-CM

## 2023-12-27 RX ORDER — PANTOPRAZOLE SODIUM 40 MG/1
TABLET, DELAYED RELEASE ORAL
Qty: 60 TABLET | Refills: 0 | Status: SHIPPED | OUTPATIENT
Start: 2023-12-27

## 2023-12-28 NOTE — TELEPHONE ENCOUNTER
FRANCK for patient advising her of the courtesy refill sent to her pharmacy, advised her that she needs to call to scheduled her yearly physical

## 2024-01-08 ENCOUNTER — HOSPITAL ENCOUNTER (OUTPATIENT)
Dept: RADIOLOGY | Facility: CLINIC | Age: 59
Discharge: HOME | End: 2024-01-08
Admitting: PHYSICAL MEDICINE & REHABILITATION
Payer: COMMERCIAL

## 2024-01-08 ENCOUNTER — TELEPHONE (OUTPATIENT)
Dept: RADIOLOGY | Facility: CLINIC | Age: 59
End: 2024-01-08

## 2024-01-08 VITALS
DIASTOLIC BLOOD PRESSURE: 83 MMHG | TEMPERATURE: 98.6 F | SYSTOLIC BLOOD PRESSURE: 129 MMHG | OXYGEN SATURATION: 99 % | RESPIRATION RATE: 18 BRPM | HEART RATE: 71 BPM

## 2024-01-08 DIAGNOSIS — M47.816 LUMBAR SPONDYLOSIS: ICD-10-CM

## 2024-01-08 PROCEDURE — 64635 DESTROY LUMB/SAC FACET JNT: CPT | Performed by: PHYSICAL MEDICINE & REHABILITATION

## 2024-01-08 PROCEDURE — 64636 DESTROY L/S FACET JNT ADDL: CPT | Performed by: PHYSICAL MEDICINE & REHABILITATION

## 2024-01-08 RX ORDER — METHYLPREDNISOLONE ACETATE 40 MG/ML
40 INJECTION, SUSPENSION INTRA-ARTICULAR; INTRALESIONAL; INTRAMUSCULAR; PARENTERAL; SOFT TISSUE ONCE
Status: COMPLETED | OUTPATIENT
Start: 2024-01-08 | End: 2024-01-08

## 2024-01-08 RX ORDER — LIDOCAINE HYDROCHLORIDE 10 MG/ML
10 INJECTION, SOLUTION EPIDURAL; INFILTRATION; INTRACAUDAL; PERINEURAL ONCE
Status: COMPLETED | OUTPATIENT
Start: 2024-01-08 | End: 2024-01-08

## 2024-01-08 RX ORDER — BUPIVACAINE HCL/PF 2.5 MG/ML
5 VIAL (ML) INJECTION ONCE
Status: COMPLETED | OUTPATIENT
Start: 2024-01-08 | End: 2024-01-08

## 2024-01-08 RX ADMIN — BUPIVACAINE HYDROCHLORIDE 3 ML: 2.5 INJECTION, SOLUTION EPIDURAL; INFILTRATION; INTRACAUDAL at 13:43

## 2024-01-08 RX ADMIN — METHYLPREDNISOLONE ACETATE 40 MG: 40 INJECTION, SUSPENSION INTRA-ARTICULAR; INTRALESIONAL; INTRAMUSCULAR; PARENTERAL; SOFT TISSUE at 13:43

## 2024-01-08 RX ADMIN — LIDOCAINE HYDROCHLORIDE 6 ML: 10 INJECTION, SOLUTION EPIDURAL; INFILTRATION; INTRACAUDAL; PERINEURAL at 13:35

## 2024-01-08 NOTE — DISCHARGE INSTR - LAB

## 2024-01-08 NOTE — H&P
History of Present Illness: The patient is a 58 y.o. female who presents with complaints of right sided low back pain    Past Medical History:   Diagnosis Date    Anxiety     Arthritis     Asthma     Cancer (HCC)     Skin.. basal cell carcinoma    Fibroid     20 plus years ago..one in each breast    GERD (gastroesophageal reflux disease)     Irritable bowel syndrome     Migraine     Occasional    PONV (postoperative nausea and vomiting)     Skin cancer     Urinary tract infection     Occasionally       Past Surgical History:   Procedure Laterality Date    BREAST BIOPSY      20 plus years ago...fibroids    BREAST CYST ASPIRATION Bilateral     BREAST SURGERY Bilateral     cyst removal    COLONOSCOPY      COLPOSCOPY  2019    [place] ESOPHAGOGASTRODUODENOSCOPY TRANSORAL DIAGNOSTIC N/A 1/2/2018    Procedure: EGD AND COLONOSCOPY;  Surgeon: Everette Jorgensen MD;  Location: [facility];  Service: Gastroenterology    TONSILLECTOMY      WISDOM TOOTH EXTRACTION           Current Outpatient Medications:     ALPRAZolam (XANAX) 0.25 mg tablet, Take 1 tablet (0.25 mg total) by mouth 3 (three) times a day as needed for anxiety, Disp: 60 tablet, Rfl: 0    cetirizine (ZyrTEC) 10 mg tablet, Take by mouth, Disp: , Rfl:     Cholecalciferol (VITAMIN D3) 2000 units TABS, Take 1 tablet by mouth daily, Disp: , Rfl:     citalopram (CeleXA) 40 mg tablet, TAKE 1 TABLET BY MOUTH EVERY DAY, Disp: 90 tablet, Rfl: 0    clindamycin (CLEOCIN T) 1 % lotion, APPLY TO RASH AREAS TWICE A DAY, Disp: , Rfl:     estradiol (ESTRACE VAGINAL) 0.1 mg/g vaginal cream, Place pea sized amount PV q hs x 2 weeks; then place pea sized amount PV 2-3x/week for maintenance, Disp: 42.5 g, Rfl: 0    Ibuprofen (MOTRIN PO), Take by mouth, Disp: , Rfl:     norethindrone-ethinyl estradiol (FEMHRT LOW DOSE) 0.5-2.5 MG-MCG per tablet, TAKE 1 TABLET BY MOUTH EVERY DAY, Disp: 84 tablet, Rfl: 1    pantoprazole (PROTONIX) 40 mg tablet, TAKE 1 TABLET BY MOUTH TWO TIMES DAILY, Disp: 60  tablet, Rfl: 0    Sodium Fluoride 5000 PPM 1.1 % PSTE, USE AS DIRECTED EVERY DAY AT BEDTIME, Disp: , Rfl:     Current Facility-Administered Medications:     bupivacaine (PF) (MARCAINE) 0.25 % injection 5 mL, 5 mL, Perineural, Once, Ramesh Bradley,     lidocaine (PF) (XYLOCAINE-MPF) 1 % injection 10 mL, 10 mL, Infiltration, Once, Ramesh Bradley DO    methylPREDNISolone acetate (DEPO-MEDROL) injection 40 mg, 40 mg, Perineural, Once, Ramesh Bradley, DO    No Known Allergies    Physical Exam:   Vitals:    01/08/24 1308   BP: 134/81   Pulse: 85   Resp: 18   Temp: 98.6 °F (37 °C)   SpO2: 98%     General: Awake, Alert, Oriented x 3, Mood and affect appropriate  Respiratory: Respirations even and unlabored  Cardiovascular: Peripheral pulses intact; no edema  Musculoskeletal Exam: Tenderness palpation right side lumbar paraspinals    ASA Score: 2    Patient/Chart Verification  Patient ID Verified: Verbal  ID Band Applied: No  Consents Confirmed: Procedural, To be obtained in the Pre-Procedure area  H&P( within 30 days) Verified: To be obtained in the Pre-Procedure area  Allergies Reviewed: Yes  Anticoag/NSAID held?: No  Currently on antibiotics?: No    Assessment:   1. Lumbar spondylosis        Plan: Rt L2-L4 RFA

## 2024-01-09 NOTE — TELEPHONE ENCOUNTER
S/w pt who states that she had pain post procedure that she treated with tylenol and ice with some relief. Pt aware she can take tylenol,  no more that 3000 mg/24 hrs and NSAIDS no more that 2400 mg/24 hrs if needed for pain relief. Pt will continue with ice and or heat as needed.  Pt aware it may take 4-6 weeks for the full effect. Pt denies fever, sunburn like sensation or signs of infection.    Clerical- Please contact pt for f/u RFA appt. Thank you

## 2024-01-10 ENCOUNTER — OFFICE VISIT (OUTPATIENT)
Dept: FAMILY MEDICINE CLINIC | Facility: CLINIC | Age: 59
End: 2024-01-10
Payer: COMMERCIAL

## 2024-01-10 VITALS
SYSTOLIC BLOOD PRESSURE: 116 MMHG | WEIGHT: 138 LBS | BODY MASS INDEX: 25.4 KG/M2 | HEIGHT: 62 IN | DIASTOLIC BLOOD PRESSURE: 80 MMHG | TEMPERATURE: 97.7 F | HEART RATE: 85 BPM | OXYGEN SATURATION: 98 %

## 2024-01-10 DIAGNOSIS — J01.90 ACUTE SINUSITIS, RECURRENCE NOT SPECIFIED, UNSPECIFIED LOCATION: Primary | ICD-10-CM

## 2024-01-10 DIAGNOSIS — R68.89 FLU-LIKE SYMPTOMS: ICD-10-CM

## 2024-01-10 LAB
SL AMB POCT RAPID FLU A: POSITIVE
SL AMB POCT RAPID FLU B: NEGATIVE

## 2024-01-10 PROCEDURE — 87804 INFLUENZA ASSAY W/OPTIC: CPT | Performed by: FAMILY MEDICINE

## 2024-01-10 PROCEDURE — 99213 OFFICE O/P EST LOW 20 MIN: CPT | Performed by: FAMILY MEDICINE

## 2024-01-10 RX ORDER — PREDNISONE 10 MG/1
TABLET ORAL
Qty: 26 TABLET | Refills: 0 | Status: SHIPPED | OUTPATIENT
Start: 2024-01-10 | End: 2024-01-23 | Stop reason: ALTCHOICE

## 2024-01-10 RX ORDER — CEFUROXIME AXETIL 500 MG/1
500 TABLET ORAL EVERY 12 HOURS SCHEDULED
Qty: 20 TABLET | Refills: 0 | Status: SHIPPED | OUTPATIENT
Start: 2024-01-10 | End: 2024-01-20

## 2024-01-15 ENCOUNTER — NURSE TRIAGE (OUTPATIENT)
Age: 59
End: 2024-01-15

## 2024-01-15 DIAGNOSIS — R42 DIZZINESS: Primary | ICD-10-CM

## 2024-01-15 RX ORDER — MECLIZINE HYDROCHLORIDE 25 MG/1
25 TABLET ORAL EVERY 8 HOURS SCHEDULED
Qty: 30 TABLET | Refills: 0 | Status: SHIPPED | OUTPATIENT
Start: 2024-01-15

## 2024-01-15 NOTE — TELEPHONE ENCOUNTER
Patient calls in stating she tested positive flu on 10 th and was seen by Dr. West.  She states she also had  a double ear infection.  Patient calls complaining of dizziness and believes it is most likely from ears and flu. Denies any other symptoms such as visual changes, vomiting, fever,  chest pain or SOB. Patient requesting medication send in for the dizziness.  Please follow up with patient .

## 2024-01-15 NOTE — TELEPHONE ENCOUNTER
Regarding: symptoms not improving - dizziness  ----- Message from Tessy Quinonez sent at 1/15/2024  9:41 AM EST -----  Per call escalation guidelines send message to CTS to be addressed the same day.  Patient called in stating she was recently seen for the flu and double ear infection but now has dizziness.

## 2024-01-15 NOTE — PROGRESS NOTES
Patient ID: Cristal Leong is a 58 y.o. female.    HPI: 58 y.o.female presenting with symptoms of sinus pain,pressure, nasal congestion, pnd dry cough , ear and throat pain.  She tested negative for COVID at home and tested negative for the flu in the office.  Symptoms have been going on for the past 48 hours.    SUBJECTIVE    Family History   Problem Relation Age of Onset    Breast cancer Mother 65        dx in 60's    Other Mother         stenosis    Osteoporosis Mother     Scleroderma Father     Breast cancer Maternal Grandmother         dx in 80's    Other Family         back disorder    No Known Problems Daughter     Lung cancer Maternal Aunt     No Known Problems Maternal Grandfather     No Known Problems Paternal Grandmother     No Known Problems Paternal Grandfather     No Known Problems Son     No Known Problems Maternal Aunt      Social History     Socioeconomic History    Marital status: /Civil Union     Spouse name: Not on file    Number of children: Not on file    Years of education: Not on file    Highest education level: Not on file   Occupational History    Not on file   Tobacco Use    Smoking status: Never    Smokeless tobacco: Never   Vaping Use    Vaping status: Never Used   Substance and Sexual Activity    Alcohol use: Yes     Alcohol/week: 10.0 standard drinks of alcohol     Types: 10 Glasses of wine per week     Comment: daily    Drug use: No    Sexual activity: Yes     Partners: Male   Other Topics Concern    Not on file   Social History Narrative    Drinks coffee daily     Drinks cola daily    Drinks tea daily     Social Determinants of Health     Financial Resource Strain: Not on file   Food Insecurity: Not on file   Transportation Needs: Not on file   Physical Activity: Not on file   Stress: Not on file   Social Connections: Not on file   Intimate Partner Violence: Not on file   Housing Stability: Not on file     Past Medical History:   Diagnosis Date    Anxiety     Arthritis      Asthma     Cancer (HCC)     Skin.. basal cell carcinoma    Fibroid     20 plus years ago..one in each breast    GERD (gastroesophageal reflux disease)     Irritable bowel syndrome     Migraine     Occasional    PONV (postoperative nausea and vomiting)     Skin cancer     Urinary tract infection     Occasionally     Past Surgical History:   Procedure Laterality Date    BREAST BIOPSY      20 plus years ago...fibroids    BREAST CYST ASPIRATION Bilateral     BREAST SURGERY Bilateral     cyst removal    COLONOSCOPY      COLPOSCOPY  2019    [place] ESOPHAGOGASTRODUODENOSCOPY TRANSORAL DIAGNOSTIC N/A 1/2/2018    Procedure: EGD AND COLONOSCOPY;  Surgeon: Everette Jorgensen MD;  Location: [facility];  Service: Gastroenterology    TONSILLECTOMY      WISDOM TOOTH EXTRACTION       No Known Allergies    Current Outpatient Medications:     ALPRAZolam (XANAX) 0.25 mg tablet, Take 1 tablet (0.25 mg total) by mouth 3 (three) times a day as needed for anxiety, Disp: 60 tablet, Rfl: 0    cefuroxime (CEFTIN) 500 mg tablet, Take 1 tablet (500 mg total) by mouth every 12 (twelve) hours for 10 days, Disp: 20 tablet, Rfl: 0    cetirizine (ZyrTEC) 10 mg tablet, Take by mouth, Disp: , Rfl:     Cholecalciferol (VITAMIN D3) 2000 units TABS, Take 1 tablet by mouth daily, Disp: , Rfl:     citalopram (CeleXA) 40 mg tablet, TAKE 1 TABLET BY MOUTH EVERY DAY, Disp: 90 tablet, Rfl: 0    Ibuprofen (MOTRIN PO), Take by mouth, Disp: , Rfl:     pantoprazole (PROTONIX) 40 mg tablet, TAKE 1 TABLET BY MOUTH TWO TIMES DAILY, Disp: 60 tablet, Rfl: 0    predniSONE 10 mg tablet, 3 tabs po bid x2 days, then 2 tabs po bid x2 days, then 1 tab bid x2 days, then 1 daily until done., Disp: 26 tablet, Rfl: 0    Sodium Fluoride 5000 PPM 1.1 % PSTE, USE AS DIRECTED EVERY DAY AT BEDTIME, Disp: , Rfl:     Review of Systems  Constitutional:     Denies fever, chills, fatigue, weakness ,weight loss, weight gain      ENT: Denies earache, loss of hearing, nosebleed, nasal  "discharge,but complains of nasal congestion, sore throat,hoarseness and sinus pain and pressure    Pulmonary: Denies shortness of breath ,cough , dyspnea on exertionon, orthopnea ,+ PND   Cardiovascular:  Denies bradycardia , tachycardia ,palpations, lower extremity, edema leg, claudication  Breast:  Denies new or changing breast lumps,  nipple discharge, nipple changes,  Abdomen:  Denies abdominal pain , anorexia ,indigestion, nausea ,vomiting, constipation , diarrhea  Musculoskeletal: Denies myalgias, arthralgias, joint swelling, joint stiffness ,limb pain, limb swelling  Lymph:+ swollen glands  Gu: no dysuria or urinary frequency  Skin: Denies skin rash, skin lesion, skin wound, itching,dry skin  Neuro: Denies headache, numbness, tingling, confusion, loss of consciousness, dizziness ,vertigo  Psychiatric: Denies feelings of depression, suicidal ideation, anxiety, sleep disturbances    OBJECTIVE  /80   Pulse 85   Temp 97.7 °F (36.5 °C)   Ht 5' 2\" (1.575 m)   Wt 62.6 kg (138 lb)   SpO2 98%   BMI 25.24 kg/m²   Constitutional:   NAD, well appearing and well nourished      ENT:   Conjunctiva and lids: no injection, edema, or discharge    Pupils and iris: NAVDEEP bilaterally   External inspection of ears and nose: normal without deformities or discharge.      Otoscopic exam: Canals patent ; tm are dull, with with erythem and effusions  ENasal mucosa, septum and turbinates: Turbinae injection with discharge   Oropharynx:  Moist mucosa, normal tongue and tonsils without lesions.Erythema and injection  of post pharynx with pnd      Pulmonary:Respiratory effort normal rate and rhythm, no increased work of breathing. Auscultation of lungs:  Clear bilaterally with no adventitious breath sounds       Cardiovascular: regular rate and rhythm, S1 and S2, no murmur, no edema and/or varicosities of LE      Abdomen: Soft and non-distended    Positive bowel sounds    No heptomegaly or splenomegaly    Lymphatic: Anterior  " cervical lymphadenopathy         Muscskeletal:  Gait and station: Normal gait     Digits and nails normal without clubbing or cyanosis     Inspection/palpation of joints, bones, and muscles:  No joint tenderness, swelling, full active and passive range of motion      Gu: no suprabubic tenderness, CVA tenderness or urethral discharge  Skin: Normal skin turgor and no rashes    Neuro:    Normal reflexes   Psych:   alert and oriented to person, place and time  normal mood and affect      Assessment/Plan:Diagnoses and all orders for this visit:    Acute sinusitis, recurrence not specified, unspecified location  -     predniSONE 10 mg tablet; 3 tabs po bid x2 days, then 2 tabs po bid x2 days, then 1 tab bid x2 days, then 1 daily until done.  -     cefuroxime (CEFTIN) 500 mg tablet; Take 1 tablet (500 mg total) by mouth every 12 (twelve) hours for 10 days    Flu-like symptoms  -     POCT rapid flu A and B        Reviewed with patient plan to treat with above plan.    Patient instructed to call in 72 hours if not feeling better or if symptoms worsen

## 2024-01-15 NOTE — TELEPHONE ENCOUNTER
"Reason for Disposition  • MODERATE dizziness (e.g., interferes with normal activities) (Exception: dizziness caused by heat exposure, sudden standing, or poor fluid intake)    Answer Assessment - Initial Assessment Questions  1. DESCRIPTION: \"Describe your dizziness.\"          Room spinning head foggy and full       2. LIGHTHEADED: \"Do you feel lightheaded?\" (e.g., somewhat faint, woozy, weak upon standing)         denies    3. VERTIGO: \"Do you feel like either you or the room is spinning or tilting?\" (i.e. vertigo)       denies      4. SEVERITY: \"How bad is it?\"  \"Do you feel like you are going to faint?\" \"Can you stand and walk?\"    - MILD: Feels slightly dizzy, but walking normally.    - MODERATE: Feels very unsteady when walking, but not falling; interferes with normal activities (e.g., school, work) .    - SEVERE: Unable to walk without falling, or requires assistance to walk without falling; feels like passing out now.        Mild     5. ONSET:  \"When did the dizziness begin?\"        Over the weekend      6. AGGRAVATING FACTORS: \"Does anything make it worse?\" (e.g., standing, change in head position)        Yes      7. HEART RATE: \"Can you tell me your heart rate?\" \"How many beats in 15 seconds?\"  (Note: not all patients can do this)          Unsure     8. CAUSE: \"What do you think is causing the dizziness?\"        flu      9. RECURRENT SYMPTOM: \"Have you had dizziness before?\" If Yes, ask: \"When was the last time?\" \"What happened that time?\"        denies    10. OTHER SYMPTOMS: \"Do you have any other symptoms?\" (e.g., fever, chest pain, vomiting, diarrhea, bleeding)          Denies    Protocols used: Dizziness-ADULT-[place]    "

## 2024-01-23 ENCOUNTER — APPOINTMENT (OUTPATIENT)
Dept: LAB | Facility: CLINIC | Age: 59
End: 2024-01-23
Payer: COMMERCIAL

## 2024-01-23 ENCOUNTER — OFFICE VISIT (OUTPATIENT)
Dept: FAMILY MEDICINE CLINIC | Facility: CLINIC | Age: 59
End: 2024-01-23
Payer: COMMERCIAL

## 2024-01-23 VITALS
TEMPERATURE: 97.6 F | OXYGEN SATURATION: 98 % | HEART RATE: 96 BPM | SYSTOLIC BLOOD PRESSURE: 124 MMHG | DIASTOLIC BLOOD PRESSURE: 82 MMHG | WEIGHT: 138.8 LBS | BODY MASS INDEX: 25.54 KG/M2 | HEIGHT: 62 IN

## 2024-01-23 DIAGNOSIS — F41.9 ANXIETY: ICD-10-CM

## 2024-01-23 DIAGNOSIS — Z00.00 ANNUAL PHYSICAL EXAM: ICD-10-CM

## 2024-01-23 DIAGNOSIS — Z00.00 ANNUAL PHYSICAL EXAM: Primary | ICD-10-CM

## 2024-01-23 LAB
ALBUMIN SERPL BCP-MCNC: 4.7 G/DL (ref 3.5–5)
ALP SERPL-CCNC: 65 U/L (ref 34–104)
ALT SERPL W P-5'-P-CCNC: 43 U/L (ref 7–52)
ANION GAP SERPL CALCULATED.3IONS-SCNC: 10 MMOL/L
AST SERPL W P-5'-P-CCNC: 35 U/L (ref 13–39)
BILIRUB SERPL-MCNC: 0.69 MG/DL (ref 0.2–1)
BUN SERPL-MCNC: 18 MG/DL (ref 5–25)
CALCIUM SERPL-MCNC: 9.8 MG/DL (ref 8.4–10.2)
CHLORIDE SERPL-SCNC: 100 MMOL/L (ref 96–108)
CHOLEST SERPL-MCNC: 297 MG/DL
CO2 SERPL-SCNC: 28 MMOL/L (ref 21–32)
CREAT SERPL-MCNC: 0.75 MG/DL (ref 0.6–1.3)
GFR SERPL CREATININE-BSD FRML MDRD: 89 ML/MIN/1.73SQ M
GLUCOSE P FAST SERPL-MCNC: 86 MG/DL (ref 65–99)
HDLC SERPL-MCNC: 59 MG/DL
LDLC SERPL CALC-MCNC: 165 MG/DL (ref 0–100)
NONHDLC SERPL-MCNC: 238 MG/DL
POTASSIUM SERPL-SCNC: 4.3 MMOL/L (ref 3.5–5.3)
PROT SERPL-MCNC: 7.4 G/DL (ref 6.4–8.4)
SODIUM SERPL-SCNC: 138 MMOL/L (ref 135–147)
TRIGL SERPL-MCNC: 364 MG/DL

## 2024-01-23 PROCEDURE — 99213 OFFICE O/P EST LOW 20 MIN: CPT | Performed by: FAMILY MEDICINE

## 2024-01-23 PROCEDURE — 80061 LIPID PANEL: CPT

## 2024-01-23 PROCEDURE — 36415 COLL VENOUS BLD VENIPUNCTURE: CPT

## 2024-01-23 PROCEDURE — 80053 COMPREHEN METABOLIC PANEL: CPT

## 2024-01-23 RX ORDER — ALPRAZOLAM 0.5 MG/1
0.5 TABLET ORAL
Qty: 30 TABLET | Refills: 3 | Status: SHIPPED | OUTPATIENT
Start: 2024-01-23 | End: 2024-02-22

## 2024-01-23 NOTE — PROGRESS NOTES
ADULT ANNUAL PHYSICAL  [facility]    NAME: Cristal Leong  AGE: 58 y.o. SEX: female  : 1965     DATE: 2024     Assessment and Plan:     Problem List Items Addressed This Visit    None      Immunizations and preventive care screenings were discussed with patient today. Appropriate education was printed on patient's after visit summary.    Counseling:  Exercise: the importance of regular exercise/physical activity was discussed. Recommend exercise 3-5 times per week for at least 30 minutes.          No follow-ups on file.     Chief Complaint:     Chief Complaint   Patient presents with    Physical Exam      History of Present Illness:     Adult Annual Physical   Patient here for a comprehensive physical exam. The patient reports no problems.    Diet and Physical Activity  Diet/Nutrition: well balanced diet.   Exercise: vigorous cardiovascular exercise.      Depression Screening  PHQ-2/9 Depression Screening           General Health  Sleep: sleeps well.   Hearing: normal - bilateral.  Vision: wears glasses.   Dental: regular dental visits.       /GYN Health  Follows with gynecology? no   Patient is: postmenopausal  Last menstrual period:   Contraceptive method: .    Advanced Care Planning  Do you have an advanced directive? no  Do you have a durable medical power of ? no     Review of Systems:     Review of Systems   Constitutional:  Negative for appetite change, chills and fever.   HENT:  Negative for ear pain, facial swelling, rhinorrhea, sinus pain, sore throat and trouble swallowing.    Eyes:  Negative for discharge and redness.   Respiratory:  Negative for chest tightness, shortness of breath and wheezing.    Cardiovascular:  Negative for chest pain and palpitations.   Gastrointestinal:  Negative for abdominal pain, diarrhea, nausea and vomiting.   Endocrine: Negative for polyuria.   Genitourinary:  Negative for dysuria and  urgency.   Musculoskeletal:  Negative for arthralgias and back pain.   Skin:  Negative for rash.   Neurological:  Negative for dizziness, weakness and headaches.   Hematological:  Negative for adenopathy.   Psychiatric/Behavioral:  Negative for behavioral problems, confusion and sleep disturbance.    All other systems reviewed and are negative.     Past Medical History:     Past Medical History:   Diagnosis Date    Allergic     Anxiety     Arthritis     Asthma     Cancer (HCC)     Skin.. basal cell carcinoma    Fibroid     20 plus years ago..one in each breast    GERD (gastroesophageal reflux disease)     Irritable bowel syndrome     Migraine     Occasional    PONV (postoperative nausea and vomiting)     Skin cancer     Urinary tract infection     Occasionally      Past Surgical History:     Past Surgical History:   Procedure Laterality Date    BREAST BIOPSY      20 plus years ago...fibroids    BREAST CYST ASPIRATION Bilateral     BREAST SURGERY Bilateral     cyst removal    COLONOSCOPY      COLPOSCOPY  2019    [place] ESOPHAGOGASTRODUODENOSCOPY TRANSORAL DIAGNOSTIC N/A 1/2/2018    Procedure: EGD AND COLONOSCOPY;  Surgeon: Everette Jorgensen MD;  Location[facility];  Service: Gastroenterology    TONSILLECTOMY      WISDOM TOOTH EXTRACTION        Social History:     Social History     Socioeconomic History    Marital status: /Civil Union     Spouse name: None    Number of children: None    Years of education: None    Highest education level: None   Occupational History    None   Tobacco Use    Smoking status: Never     Passive exposure: Past    Smokeless tobacco: Never   Vaping Use    Vaping status: Never Used   Substance and Sexual Activity    Alcohol use: Yes     Alcohol/week: 10.0 standard drinks of alcohol     Types: 10 Glasses of wine per week     Comment: daily    Drug use: No    Sexual activity: Yes     Partners: Male   Other Topics Concern    None   Social History Narrative    Drinks coffee daily      Drinks cola daily    Drinks tea daily     Social Determinants of Health     Financial Resource Strain: Not on file   Food Insecurity: Not on file   Transportation Needs: Not on file   Physical Activity: Not on file   Stress: Not on file   Social Connections: Not on file   Intimate Partner Violence: Not on file   Housing Stability: Not on file      Family History:     Family History   Problem Relation Age of Onset    Breast cancer Mother 65        dx in 60's    Other Mother         stenosis    Osteoporosis Mother     Cancer Mother     Scleroderma Father     Breast cancer Maternal Grandmother         dx in 80's    Other Family         back disorder    No Known Problems Daughter     Lung cancer Maternal Aunt     No Known Problems Maternal Grandfather     No Known Problems Paternal Grandmother     No Known Problems Paternal Grandfather     No Known Problems Son     No Known Problems Maternal Aunt       Current Medications:     Current Outpatient Medications   Medication Sig Dispense Refill    meclizine (ANTIVERT) 25 mg tablet Take 1 tablet (25 mg total) by mouth every 8 (eight) hours 30 tablet 0    ALPRAZolam (XANAX) 0.25 mg tablet Take 1 tablet (0.25 mg total) by mouth 3 (three) times a day as needed for anxiety 60 tablet 0    cetirizine (ZyrTEC) 10 mg tablet Take by mouth      Cholecalciferol (VITAMIN D3) 2000 units TABS Take 1 tablet by mouth daily      citalopram (CeleXA) 40 mg tablet TAKE 1 TABLET BY MOUTH EVERY DAY 90 tablet 0    Ibuprofen (MOTRIN PO) Take by mouth      pantoprazole (PROTONIX) 40 mg tablet TAKE 1 TABLET BY MOUTH TWO TIMES DAILY 60 tablet 0    predniSONE 10 mg tablet 3 tabs po bid x2 days, then 2 tabs po bid x2 days, then 1 tab bid x2 days, then 1 daily until done. 26 tablet 0    Sodium Fluoride 5000 PPM 1.1 % PSTE USE AS DIRECTED EVERY DAY AT BEDTIME       No current facility-administered medications for this visit.      Allergies:     No Known Allergies   Physical Exam:     /82   Pulse 96   " Temp 97.6 °F (36.4 °C)   Ht 5' 2\" (1.575 m)   Wt 63 kg (138 lb 12.8 oz)   SpO2 98%   BMI 25.39 kg/m²     Physical Exam  Vitals and nursing note reviewed.   Constitutional:       General: She is not in acute distress.     Appearance: Normal appearance. She is not ill-appearing or diaphoretic.   HENT:      Head: Normocephalic and atraumatic.      Right Ear: Tympanic membrane, ear canal and external ear normal.      Left Ear: Tympanic membrane, ear canal and external ear normal.      Nose: Nose normal. No congestion or rhinorrhea.      Mouth/Throat:      Mouth: Mucous membranes are moist.      Pharynx: Oropharynx is clear. No posterior oropharyngeal erythema.   Eyes:      General:         Right eye: No discharge.         Left eye: No discharge.      Extraocular Movements: Extraocular movements intact.      Conjunctiva/sclera: Conjunctivae normal.      Pupils: Pupils are equal, round, and reactive to light.   Neck:      Vascular: No carotid bruit.   Cardiovascular:      Rate and Rhythm: Normal rate and regular rhythm.      Pulses: Normal pulses.      Heart sounds: Normal heart sounds. No murmur heard.  Pulmonary:      Effort: Pulmonary effort is normal. No respiratory distress.      Breath sounds: Normal breath sounds. No wheezing or rhonchi.   Abdominal:      General: Abdomen is flat. Bowel sounds are normal. There is no distension.      Palpations: There is no mass.      Tenderness: There is no abdominal tenderness.   Musculoskeletal:         General: No swelling or deformity. Normal range of motion.      Cervical back: Normal range of motion and neck supple. No rigidity.      Right lower leg: No edema.      Left lower leg: No edema.   Lymphadenopathy:      Cervical: No cervical adenopathy.   Skin:     General: Skin is warm and dry.      Capillary Refill: Capillary refill takes less than 2 seconds.      Coloration: Skin is not jaundiced.      Findings: No bruising, erythema or rash.   Neurological:      General: " No focal deficit present.      Mental Status: She is alert and oriented to person, place, and time.      Cranial Nerves: No cranial nerve deficit.      Sensory: No sensory deficit.      Gait: Gait normal.      Deep Tendon Reflexes: Reflexes normal.   Psychiatric:         Mood and Affect: Mood normal.         Behavior: Behavior normal.         Thought Content: Thought content normal.         Judgment: Judgment normal.          SARAH COBURN  [facility]

## 2024-01-24 ENCOUNTER — TELEPHONE (OUTPATIENT)
Dept: FAMILY MEDICINE CLINIC | Facility: CLINIC | Age: 59
End: 2024-01-24

## 2024-01-24 DIAGNOSIS — E78.00 HYPERCHOLESTEROLEMIA: Primary | ICD-10-CM

## 2024-01-24 RX ORDER — ROSUVASTATIN CALCIUM 10 MG/1
10 TABLET, COATED ORAL DAILY
Qty: 30 TABLET | Refills: 5 | Status: SHIPPED | OUTPATIENT
Start: 2024-01-24

## 2024-01-24 NOTE — TELEPHONE ENCOUNTER
Pt returned our call. Providers result note given. Pt is aware and understands. Please sent the Crestor to [facility] and put in the future lab orders that she will get done in six weeks.

## 2024-01-24 NOTE — TELEPHONE ENCOUNTER
----- Message from Jessica West DO sent at 1/24/2024  5:27 AM EST -----  Her cholesterol is high at 297 and ratio is high at 5 which puts her at risk for blocking arteries.  I think she should go on a low dose statin, crestor which gets cleared through both the kidneys and liver so doesn't cause muscle pain.  If agreeable, we will recheck her in 6 weeks.

## 2024-02-14 DIAGNOSIS — F32.A DEPRESSION, UNSPECIFIED DEPRESSION TYPE: ICD-10-CM

## 2024-02-14 RX ORDER — CITALOPRAM 40 MG/1
40 TABLET ORAL DAILY
Qty: 90 TABLET | Refills: 1 | Status: SHIPPED | OUTPATIENT
Start: 2024-02-14

## 2024-02-16 ENCOUNTER — OFFICE VISIT (OUTPATIENT)
Dept: PAIN MEDICINE | Facility: CLINIC | Age: 59
End: 2024-02-16
Payer: COMMERCIAL

## 2024-02-16 VITALS
WEIGHT: 138 LBS | SYSTOLIC BLOOD PRESSURE: 134 MMHG | HEART RATE: 86 BPM | RESPIRATION RATE: 16 BRPM | DIASTOLIC BLOOD PRESSURE: 94 MMHG | BODY MASS INDEX: 25.4 KG/M2 | HEIGHT: 62 IN

## 2024-02-16 DIAGNOSIS — M46.1 SACROILIITIS (HCC): Primary | ICD-10-CM

## 2024-02-16 PROCEDURE — 99214 OFFICE O/P EST MOD 30 MIN: CPT

## 2024-02-16 RX ORDER — METHYLPREDNISOLONE 4 MG/1
TABLET ORAL
Qty: 1 EACH | Refills: 0 | Status: SHIPPED | OUTPATIENT
Start: 2024-02-16

## 2024-02-16 NOTE — PROGRESS NOTES
Assessment:  1. Sacroiliitis (HCC)        Plan:  The patient is a 58-year-old female with a history of chronic pain secondary to low back pain, lumbar spondylosis and lumbar facet arthropathy who presents to the office with improved right-sided low back pain and worsening left-sided low back pain after sustaining a fall last week.    On exam, the patient is tender with palpation over left SI joint as well as having positive provocative maneuvers for sacroiliitis.  I instructed patient we can perform a left SI joint injection to decrease inflammation and provide them relief.  Patient does not want to proceed at this time, as she has a fear of needles.  I will provide her with a Medrol Dosepak to complete to help decrease inflammation.  I instructed patient to follow directions on the package and avoid NSAIDs.    My impressions and treatment recommendations were discussed in detail with the patient who verbalized understanding and had no further questions.  Discharge instructions were provided. I personally saw and examined the patient and I agree with the above discussed plan of care.    No orders of the defined types were placed in this encounter.    New Medications Ordered This Visit   Medications    methylPREDNISolone 4 MG tablet therapy pack     Sig: Use as directed on package     Dispense:  1 each     Refill:  0       History of Present Illness:  Cristal Leong is a 58 y.o. female with a history of chronic pain secondary to low back pain, lumbar spondylosis and lumbar facet arthropathy.  She was last seen on 1/8/2024 where she underwent a right-sided L2-L4 RFA after undergoing a left-sided L2-L4 RFA on 12/11/2023.  She presents to the office with improved right-sided low back pain and worsening left-sided low back pain.  She states that she did fall last week after tripping over a hose at the gas station while putting gas in her car.    She states her pain on the left side of her back is worse since the last  office visit and intermittent but worse in the morning.  She rates quality of her pain as shooting and is currently rating her pain an 8/10 on a numeric scale.    Occasions include Tylenol and over-the-counter anti-inflammatories.  She is also using lidocaine patches which are helpful.    I have personally reviewed and/or updated the patient's past medical history, past surgical history, family history, social history, current medications, allergies, and vital signs today.     Review of Systems   Respiratory:  Negative for shortness of breath.    Cardiovascular:  Negative for chest pain.   Gastrointestinal:  Negative for constipation, diarrhea, nausea and vomiting.   Musculoskeletal:  Positive for back pain. Negative for arthralgias, gait problem, joint swelling and myalgias.   Skin:  Negative for rash.   Neurological:  Negative for dizziness, seizures and weakness.   All other systems reviewed and are negative.      Patient Active Problem List   Diagnosis    Allergic rhinitis    Anxiety disorder    Bloating    Bulge of cervical disc without myelopathy    Cervical paraspinal muscle spasm    Neck pain    Esophageal reflux    Herniated cervical disc    Lower back pain    Other fatigue    Seasonal allergies    Vaginal dryness    Weight gain    Radiculopathy, cervical region    CLARA (obstructive sleep apnea)    Vaginal atrophy    Lumbar facet arthropathy    Lumbar spondylosis       Past Medical History:   Diagnosis Date    Allergic     Anxiety     Arthritis     Asthma     Cancer (HCC)     Skin.. basal cell carcinoma    Fibroid     20 plus years ago..one in each breast    GERD (gastroesophageal reflux disease)     Irritable bowel syndrome     Migraine     Occasional    PONV (postoperative nausea and vomiting)     Skin cancer     Urinary tract infection     Occasionally       Past Surgical History:   Procedure Laterality Date    BREAST BIOPSY      20 plus years ago...fibroids    BREAST CYST ASPIRATION Bilateral     BREAST  SURGERY Bilateral     cyst removal    COLONOSCOPY      COLPOSCOPY  2019    IN ESOPHAGOGASTRODUODENOSCOPY TRANSORAL DIAGNOSTIC N/A 1/2/2018    Procedure: EGD AND COLONOSCOPY;  Surgeon: Everette Jorgensen MD;  Location: [facility];  Service: Gastroenterology    TONSILLECTOMY      WISDOM TOOTH EXTRACTION         Family History   Problem Relation Age of Onset    Breast cancer Mother 65        dx in 60's    Other Mother         stenosis    Osteoporosis Mother     Cancer Mother     Scleroderma Father     Breast cancer Maternal Grandmother         dx in 80's    Other Family         back disorder    No Known Problems Daughter     Lung cancer Maternal Aunt     No Known Problems Maternal Grandfather     No Known Problems Paternal Grandmother     No Known Problems Paternal Grandfather     No Known Problems Son     No Known Problems Maternal Aunt        Social History     Occupational History    Not on file   Tobacco Use    Smoking status: Never     Passive exposure: Past    Smokeless tobacco: Never   Vaping Use    Vaping status: Never Used   Substance and Sexual Activity    Alcohol use: Yes     Alcohol/week: 10.0 standard drinks of alcohol     Types: 10 Glasses of wine per week     Comment: daily    Drug use: No    Sexual activity: Yes     Partners: Male       Current Outpatient Medications on File Prior to Visit   Medication Sig    ALPRAZolam (XANAX) 0.5 mg tablet Take 1 tablet (0.5 mg total) by mouth daily at bedtime as needed for anxiety    cetirizine (ZyrTEC) 10 mg tablet Take by mouth    Cholecalciferol (VITAMIN D3) 2000 units TABS Take 1 tablet by mouth daily    citalopram (CeleXA) 40 mg tablet Take 1 tablet (40 mg total) by mouth daily    Ibuprofen (MOTRIN PO) Take by mouth    meclizine (ANTIVERT) 25 mg tablet Take 1 tablet (25 mg total) by mouth every 8 (eight) hours    pantoprazole (PROTONIX) 40 mg tablet TAKE 1 TABLET BY MOUTH TWO TIMES DAILY    rosuvastatin (CRESTOR) 10 MG tablet Take 1 tablet (10 mg total) by  "mouth daily    Sodium Fluoride 5000 PPM 1.1 % PSTE USE AS DIRECTED EVERY DAY AT BEDTIME     No current facility-administered medications on file prior to visit.       No Known Allergies    Physical Exam:    /94   Pulse 86   Resp 16   Ht 5' 2\" (1.575 m)   Wt 62.6 kg (138 lb)   BMI 25.24 kg/m²     Constitutional:normal, well developed, well nourished, alert, in no distress and non-toxic and no overt pain behavior.  Eyes:anicteric  HEENT:grossly intact  Neck:supple, symmetric, trachea midline and no masses   Pulmonary:even and unlabored  Cardiovascular:No edema or pitting edema present  Skin:Normal without rashes or lesions and well hydrated  Psychiatric:Mood and affect appropriate  Neurologic:Cranial Nerves II-XII grossly intact  Musculoskeletal:antalgic    Lumbar Spine Exam    Appearance:  Normal lordosis  Palpation/Tenderness:  left piriformis tenderness  Sensory:  no sensory deficits noted  Range of Motion:  Flexion:  Minimally limited  with pain  Extension:  Minimally limited  with pain  Motor Strength:  Left hip flexion:  5/5  Right hip flexion:  5/5  Left knee flexion:  5/5  Left knee extension:  5/5  Right knee flexion:  5/5  Right knee extension:  5/5  Left foot dorsiflexion:  5/5  Left foot plantar flexion:  5/5  Right foot dorsiflexion:  5/5  Right foot plantar flexion:  5/5  Special Tests:  Left Straight Leg Test:  positive  Right Straight Leg Test:  negative  Left Randy's Maneuver:  positive  Right Randy's Maneuver:  negative  Left Gaenslen's Test:  positive  Left Pelvic Distraction Test:  positive      Imaging    "

## 2024-02-18 DIAGNOSIS — F41.9 ANXIETY: ICD-10-CM

## 2024-02-19 RX ORDER — ALPRAZOLAM 0.5 MG/1
0.5 TABLET ORAL
Qty: 30 TABLET | Refills: 0 | Status: SHIPPED | OUTPATIENT
Start: 2024-02-19 | End: 2024-03-20

## 2024-02-19 NOTE — TELEPHONE ENCOUNTER
1 [id number] 01/23/2024 01/23/2024 ALPRAZolam (Tablet) 30.0 30 0.5 MG NA [employer] Commercial Insurance 0 / 3 [place]    1 [id number] 12/21/2023 12/21/2023 ALPRAZolam (Tablet) 60.0 20 0.25 MG NA MELISSA [employer] Commercial Insurance 0 / 0 [place]    1 [id number] 11/14/2023 11/14/2023 ALPRAZolam (Tablet) 60.0 20 0.25 MG NA MELISSA [employer] Commercial Insurance 0 / 0 [place]    1 [id number] 10/13/2023 10/13/2023 ALPRAZolam (Tablet) 60.0 20 0.25 MG NA [employer] Commercial Insurance 0 / 0 [place]    1 [id number] 09/15/2023 09/15/2023 ALPRAZolam (Tablet) 60.0 20 0.25 MG NA [employer] Commercial Insurance 0 / 0 [place]    1 [id number] 08/15/2023 08/15/2023 ALPRAZolam (Tablet) 60.0 20 0.25 MG [employer] Commercial Insurance 0 / 0 [place]    1 [id number] 07/17/2023 07/17/2023 ALPRAZolam (Tablet) 60.0 20 0.25 MG [employer] Commercial Insurance 0 / 0 [place]    1 [id number] 06/14/2023 06/14/2023 ALPRAZolam (Tablet) 60.0 20 0.25 MG NA MELISSA [employer] Commercial Insurance 0 / 0 [place]    1 [id number] 05/10/2023 05/10/2023 ALPRAZolam (Tablet) 60.0 20 0.25 MG NA [employer] Commercial Insurance 0 / 0 [place]    1 [id number] 03/20/2023 03/20/2023 ALPRAZolam (Tablet) 60.0 20 0.25 MG [employer] Commercial Insurance 0 / 0 [place]

## 2024-02-27 DIAGNOSIS — K21.9 GASTROESOPHAGEAL REFLUX DISEASE WITHOUT ESOPHAGITIS: ICD-10-CM

## 2024-02-27 RX ORDER — PANTOPRAZOLE SODIUM 40 MG/1
TABLET, DELAYED RELEASE ORAL
Qty: 60 TABLET | Refills: 3 | Status: SHIPPED | OUTPATIENT
Start: 2024-02-27

## 2024-03-07 ENCOUNTER — APPOINTMENT (OUTPATIENT)
Dept: LAB | Facility: CLINIC | Age: 59
End: 2024-03-07
Payer: COMMERCIAL

## 2024-03-07 DIAGNOSIS — E78.00 HYPERCHOLESTEROLEMIA: ICD-10-CM

## 2024-03-07 LAB
CHOLEST SERPL-MCNC: 175 MG/DL
HDLC SERPL-MCNC: 70 MG/DL
LDLC SERPL CALC-MCNC: 48 MG/DL (ref 0–100)
NONHDLC SERPL-MCNC: 105 MG/DL
TRIGL SERPL-MCNC: 287 MG/DL

## 2024-03-07 PROCEDURE — 80061 LIPID PANEL: CPT

## 2024-03-07 PROCEDURE — 36415 COLL VENOUS BLD VENIPUNCTURE: CPT

## 2024-03-11 DIAGNOSIS — E78.00 HYPERCHOLESTEROLEMIA: Primary | ICD-10-CM

## 2024-03-22 DIAGNOSIS — F41.9 ANXIETY: ICD-10-CM

## 2024-03-22 RX ORDER — ALPRAZOLAM 0.5 MG/1
0.5 TABLET ORAL
Qty: 30 TABLET | Refills: 0 | Status: SHIPPED | OUTPATIENT
Start: 2024-03-22 | End: 2024-04-21

## 2024-03-22 NOTE — TELEPHONE ENCOUNTER
1 [id number] 02/19/2024 02/19/2024 ALPRAZolam (Tablet) 30.0 30 0.5 MG NA MELISSA [employer] Commercial Insurance 0 / 0 [place]    1 [id number] 01/23/2024 01/23/2024 ALPRAZolam (Tablet) 30.0 30 0.5 MG NA MELISSA [employer] Commercial Insurance 0 / 3 [place]    1 [id number] 12/21/2023 12/21/2023 ALPRAZolam (Tablet) 60.0 20 0.25 MG NA MELISSA [employer] Commercial Insurance 0 / 0 [place]    1 [id number] 11/14/2023 11/14/2023 ALPRAZolam (Tablet) 60.0 20 0.25 MG NA MELISSA [employer] Commercial Insurance 0 / 0 [place]    1 [id number] 10/13/2023 10/13/2023 ALPRAZolam (Tablet) 60.0 20 0.25 MG [employer] Commercial Insurance 0 / 0 [place]    1 [id number] 09/15/2023 09/15/2023 ALPRAZolam (Tablet) 60.0 20 0.25 MG NA [employer] Commercial Insurance 0 / 0 [place]    1 [id number] 08/15/2023 08/15/2023 ALPRAZolam (Tablet) 60.0 20 0.25 MG NA [employer] Commercial Insurance 0 / 0 [place]    1 [id number] 07/17/2023 07/17/2023 ALPRAZolam (Tablet) 60.0 20 0.25 MG NA MELISSA [employer] Commercial Insurance 0 / 0 [place]    1 [id number] 06/14/2023 06/14/2023 ALPRAZolam (Tablet) 60.0 20 0.25 MG NA MELISSA [employer] Commercial Insurance 0 / 0 [place]    1 [id number] 05/10/2023 05/10/2023 ALPRAZolam (Tablet) 60.0 20 0.25 MG [employer] Commercial Insurance 0 / 0 [place]

## 2024-04-21 DIAGNOSIS — F41.9 ANXIETY: ICD-10-CM

## 2024-04-22 RX ORDER — ALPRAZOLAM 0.5 MG/1
0.5 TABLET ORAL
Qty: 30 TABLET | Refills: 0 | Status: SHIPPED | OUTPATIENT
Start: 2024-04-22 | End: 2024-05-22

## 2024-04-22 NOTE — TELEPHONE ENCOUNTER
1 [id number] 03/22/2024 03/22/2024 ALPRAZolam (Tablet) 30.0 30 0.5 MG NA [employer] Commercial Insurance 0 / 0 [place]    1 [id number] 02/19/2024 02/19/2024 ALPRAZolam (Tablet) 30.0 30 0.5 MG NA MELISSA [employer] Commercial Insurance 0 / 0 [place]    1 [id number] 01/23/2024 01/23/2024 ALPRAZolam (Tablet) 30.0 30 0.5 MG NA MELISSA [employer] Commercial Insurance 0 / 3 [place]    1 [id number] 12/21/2023 12/21/2023 ALPRAZolam (Tablet) 60.0 20 0.25 MG NA [employer] Commercial Insurance 0 / 0 [place]    1 [id number] 11/14/2023 11/14/2023 ALPRAZolam (Tablet) 60.0 20 0.25 MG [employer] Commercial Insurance 0 / 0 [place]    1 [id number] 10/13/2023 10/13/2023 ALPRAZolam (Tablet) 60.0 20 0.25 MG [employer] Commercial Insurance 0 / 0 [place]    1 [id number] 09/15/2023 09/15/2023 ALPRAZolam (Tablet) 60.0 20 0.25 MG [employer] Commercial Insurance 0 / 0 [place]    1 [id number] 08/15/2023 08/15/2023 ALPRAZolam (Tablet) 60.0 20 0.25 MG NA MELISSA [employer] Commercial Insurance 0 / 0 [place]    1 [id number] 07/17/2023 07/17/2023 ALPRAZolam (Tablet) 60.0 20 0.25 MG NA [employer] Commercial Insurance 0 / 0 [place]    1 [id number] 06/14/2023 06/14/2023 ALPRAZolam (Tablet) 60.0 20 0.25 MG [employer] Commercial Insurance 0 / 0 [place]

## 2024-04-30 DIAGNOSIS — K21.9 GASTROESOPHAGEAL REFLUX DISEASE WITHOUT ESOPHAGITIS: ICD-10-CM

## 2024-05-01 RX ORDER — PANTOPRAZOLE SODIUM 40 MG/1
TABLET, DELAYED RELEASE ORAL
Qty: 60 TABLET | Refills: 5 | Status: SHIPPED | OUTPATIENT
Start: 2024-05-01

## 2024-05-20 DIAGNOSIS — F41.9 ANXIETY: ICD-10-CM

## 2024-05-20 RX ORDER — ALPRAZOLAM 0.5 MG/1
0.5 TABLET ORAL
Qty: 30 TABLET | Refills: 0 | Status: SHIPPED | OUTPATIENT
Start: 2024-05-20 | End: 2024-06-19

## 2024-05-20 NOTE — TELEPHONE ENCOUNTER
1 [id number] 04/22/2024 04/22/2024 ALPRAZolam (Tablet) 30.0 30 0.5 MG NA [employer] Commercial Insurance 0 / 0 [place]    1 [id number] 03/22/2024 03/22/2024 ALPRAZolam (Tablet) 30.0 30 0.5 MG NA MELISSA [employer] Commercial Insurance 0 / 0 [place]    1 [id number] 02/19/2024 02/19/2024 ALPRAZolam (Tablet) 30.0 30 0.5 MG NA MELISSA [employer] Commercial Insurance 0 / 0 [place]    1 [id number] 01/23/2024 01/23/2024 ALPRAZolam (Tablet) 30.0 30 0.5 MG NA [employer] Commercial Insurance 0 / 3 [place]    1 [id number] 12/21/2023 12/21/2023 ALPRAZolam (Tablet) 60.0 20 0.25 MG [employer] Commercial Insurance 0 / 0 [place]    1 [id number] 11/14/2023 11/14/2023 ALPRAZolam (Tablet) 60.0 20 0.25 MG [employer] Commercial Insurance 0 / 0 [place]    1 [id number] 10/13/2023 10/13/2023 ALPRAZolam (Tablet) 60.0 20 0.25 MG [employer] Commercial Insurance 0 / 0 [place]    1 [id number] 09/15/2023 09/15/2023 ALPRAZolam (Tablet) 60.0 20 0.25 MG NA MELISSA [employer] Commercial Insurance 0 / 0 [place]    1 [id number] 08/15/2023 08/15/2023 ALPRAZolam (Tablet) 60.0 20 0.25 MG NA [employer] Commercial Insurance 0 / 0 [place]    1 [id number] 07/17/2023 07/17/2023 ALPRAZolam (Tablet) 60.0 20 0.25 MG [employer] Commercial Insurance 0 / 0 [place]

## 2024-05-24 NOTE — RESULT NOTES
Subjective   Patient ID: Liz Hamlin is a 79 y.o. female who presents for Post-op (Last Pap None on File. Mammogram 10/27/23 -.).  HPI  Patient here for postoperative examination from diagnostic hysteroscopy D&C    Review of Systems  Review of systems negative    Objective   Physical Exam  Vitals reviewed.   Constitutional:       Appearance: Normal appearance. She is obese.   Cardiovascular:      Rate and Rhythm: Normal rate and regular rhythm.   Pulmonary:      Breath sounds: Normal breath sounds.   Abdominal:      General: Abdomen is flat. Bowel sounds are normal.      Palpations: Abdomen is soft.      Tenderness: There is no abdominal tenderness.   Genitourinary:     Comments: External genitalia unremarkable  Vagina clear cervix difficult to reach uterus approximately 10 to 12 weeks size.  Difficult to palpate due to scarring and seems fixed anterior  Adnexa without masses or tenderness  Perineum without lesions or swelling  Neurological:      General: No focal deficit present.      Mental Status: She is alert.   Psychiatric:         Mood and Affect: Mood normal.         Behavior: Behavior normal.         Thought Content: Thought content normal.         Judgment: Judgment normal.     Assessment/Plan   Diagnoses and all orders for this visit:  Postoperative examination     Chart reviewed.  Discussed findings and procedure with patient and her sister.  Currently pathology is negative but does not show any endometrial cells.  Procedure itself possibly a false passage.  Fibroid uterus probably complicating the procedure.  At this point patient would have 3 options 1 is to observe and see if there is any further bleeding possibly repeat ultrasound second option would be to repeat the procedure possibly under ultrasound to get a better sampling.  Third option would be elective hysterectomy to be sure.  Reviewed each of these 3 options risks and benefits.  At this point patient does not wish anything else done  Verified Results  US ABDOMEN LIMITED 20Nov2017 09:19AM Jelani Santos Order Number: [id number]    - Patient Instructions: To schedule this appointment, please contact Central Scheduling at [phone]  Test Name Result Flag Reference   US ABDOMEN LIMITED (Report)     RIGHT UPPER QUADRANT ULTRASOUND     INDICATION: Abdominal distention and pain     COMPARISON: None  TECHNIQUE:  Real-time ultrasound of the right upper quadrant was performed with a curvilinear transducer with both volumetric sweeps and still imaging techniques  FINDINGS:     PANCREAS: Visualized portions of the pancreas are within normal limits  AORTA AND IVC: Visualized portions are normal for patient age  LIVER:   Size: Within normal range  The liver measures 15 3 cm in the midclavicular line  Contour: Surface contour is smooth  Parenchyma: Echogenicity and echotexture are within normal limits  No evidence of suspicious mass  Limited imaging of the main portal vein shows it to be patent and hepatopetal       BILIARY:   The gallbladder is normal in caliber  No wall thickening or pericholecystic fluid  No stones or sludge identified  No sonographic Neal's sign  No intrahepatic biliary dilatation  CBD measures 4 mm  No choledocholithiasis  KIDNEY:    Right kidney measures 9 9 x 4 0 cm  Within normal limits  ASCITES:  None         IMPRESSION:     Normal right upper quadrant ultrasound       Workstation performed: [id number]     Signed by:   Anabel Ladd MD   11/20/17 understands small risk of carcinoma which does not disprove by this procedure pathology.  Patient would like to observe.  Patient return to office in 3 months but will call for any bleeding    Iam Carter MD 05/24/24 9:32 AM

## 2024-06-17 DIAGNOSIS — F41.9 ANXIETY: ICD-10-CM

## 2024-06-18 RX ORDER — ALPRAZOLAM 0.5 MG/1
0.5 TABLET ORAL
Qty: 30 TABLET | Refills: 0 | Status: SHIPPED | OUTPATIENT
Start: 2024-06-18 | End: 2024-07-18

## 2024-06-18 NOTE — TELEPHONE ENCOUNTER
1 [id number] 05/20/2024 05/20/2024 ALPRAZolam (Tablet) 30.0 30 0.5 MG NA MELISSA [employer] Commercial Insurance 0 / 0 [place]    1 [id number] 04/22/2024 04/22/2024 ALPRAZolam (Tablet) 30.0 30 0.5 MG NA MELISSA [employer] Commercial Insurance 0 / 0 [place]    1 [id number] 03/22/2024 03/22/2024 ALPRAZolam (Tablet) 30.0 30 0.5 MG NA MELISSA [employer] Commercial Insurance 0 / 0 [place]    1 [id number] 02/19/2024 02/19/2024 ALPRAZolam (Tablet) 30.0 30 0.5 MG NA MELISSA [employer] Commercial Insurance 0 / 0 [place]    1 [id number] 01/23/2024 01/23/2024 ALPRAZolam (Tablet) 30.0 30 0.5 MG [employer] Commercial Insurance 0 / 3 [place]    1 [id number] 12/21/2023 12/21/2023 ALPRAZolam (Tablet) 60.0 20 0.25 MG [employer] Commercial Insurance 0 / 0 [place]    1 [id number] 11/14/2023 11/14/2023 ALPRAZolam (Tablet) 60.0 20 0.25 MG NA [employer] Commercial Insurance 0 / 0 [place]    1 [id number] 10/13/2023 10/13/2023 ALPRAZolam (Tablet) 60.0 20 0.25 MG NA MELISSA [employer] Commercial Insurance 0 / 0 [place]    1 [id number] 09/15/2023 09/15/2023 ALPRAZolam (Tablet) 60.0 20 0.25 MG NA MELISSA [employer] Commercial Insurance 0 / 0 [place]    1 [id number] 08/15/2023 08/15/2023 ALPRAZolam (Tablet) 60.0 20 0.25 MG [employer] Commercial Insurance 0 / 0 [place]

## 2024-07-04 DIAGNOSIS — E78.00 HYPERCHOLESTEROLEMIA: ICD-10-CM

## 2024-07-04 RX ORDER — ROSUVASTATIN CALCIUM 10 MG/1
10 TABLET, COATED ORAL DAILY
Qty: 30 TABLET | Refills: 5 | Status: SHIPPED | OUTPATIENT
Start: 2024-07-04

## 2024-07-10 ENCOUNTER — TELEPHONE (OUTPATIENT)
Age: 59
End: 2024-07-10

## 2024-07-10 NOTE — TELEPHONE ENCOUNTER
Call from patient questioning if she needs to have labs and follow up on her cholesterol due to being put on new medication at last visit. Unable to find info in last OV note. Please call patient to advise. Thank you.

## 2024-07-15 ENCOUNTER — APPOINTMENT (OUTPATIENT)
Dept: LAB | Facility: CLINIC | Age: 59
End: 2024-07-15
Payer: COMMERCIAL

## 2024-07-15 DIAGNOSIS — E78.00 HYPERCHOLESTEROLEMIA: ICD-10-CM

## 2024-07-15 LAB
CHOLEST SERPL-MCNC: 160 MG/DL
HDLC SERPL-MCNC: 71 MG/DL
LDLC SERPL CALC-MCNC: 48 MG/DL (ref 0–100)
NONHDLC SERPL-MCNC: 89 MG/DL
TRIGL SERPL-MCNC: 205 MG/DL

## 2024-07-15 PROCEDURE — 80061 LIPID PANEL: CPT

## 2024-07-15 PROCEDURE — 36415 COLL VENOUS BLD VENIPUNCTURE: CPT

## 2024-07-17 ENCOUNTER — NURSE TRIAGE (OUTPATIENT)
Age: 59
End: 2024-07-17

## 2024-07-17 ENCOUNTER — APPOINTMENT (OUTPATIENT)
Dept: LAB | Facility: CLINIC | Age: 59
End: 2024-07-17
Payer: COMMERCIAL

## 2024-07-17 DIAGNOSIS — R30.0 BURNING WITH URINATION: ICD-10-CM

## 2024-07-17 DIAGNOSIS — R19.00 PELVIC FULLNESS: Primary | ICD-10-CM

## 2024-07-17 LAB
BILIRUB UR QL STRIP: NEGATIVE
CLARITY UR: CLEAR
COLOR UR: COLORLESS
GLUCOSE UR STRIP-MCNC: NEGATIVE MG/DL
HGB UR QL STRIP.AUTO: NEGATIVE
KETONES UR STRIP-MCNC: NEGATIVE MG/DL
LEUKOCYTE ESTERASE UR QL STRIP: NEGATIVE
NITRITE UR QL STRIP: NEGATIVE
PH UR STRIP.AUTO: 6 [PH]
PROT UR STRIP-MCNC: NEGATIVE MG/DL
SP GR UR STRIP.AUTO: 1.01 (ref 1–1.03)
UROBILINOGEN UR STRIP-ACNC: <2 MG/DL

## 2024-07-17 PROCEDURE — 81003 URINALYSIS AUTO W/O SCOPE: CPT

## 2024-07-17 NOTE — TELEPHONE ENCOUNTER
"Patient is calling in with concerns for a UTI. She reports she started with lower abdominal fulness mostly on the right side, and burning with urination/urinary frequency. She reports the fullness started a couple weeks ago and the burning/frequency started today. She denies any vaginal bleeding, flank pain, or fevers. She does have a hx of ovarian cysts in her 40's. I ordered a UA/Culture for her to complete to day to check for UTI. Do you recommend anything else for the pelvic fullness?      Answer Assessment - Initial Assessment Questions  1. SYMPTOM: \"What's the main symptom you're concerned about?\" (e.g., frequency, incontinence)      Full feeling in lower pelvic region mostly right, burning with urination and frequency   2. ONSET: \"When did the  symptoms  start?\"      Pelvic fullness for a couple weeks coming and going, burning and frequency started today   3. PAIN: \"Is there any pain?\" If Yes, ask: \"How bad is it?\" (Scale: 1-10; mild, moderate, severe)      Denies pain- just uncomfortable. More of a dull ache that comes and goes   4. CAUSE: \"What do you think is causing the symptoms?\"      Hx of ovarian cysts in the past   5. OTHER SYMPTOMS: \"Do you have any other symptoms?\" (e.g., fever, flank pain, blood in urine, pain with urination)      Denies, only pain with urination    Protocols used: Urinary Symptoms-ADULT-[place]    "

## 2024-07-17 NOTE — TELEPHONE ENCOUNTER
Regarding: Frequent urination, burning, pelvic aching  ----- Message from Ella TAMAYO sent at 7/17/2024 10:50 AM EDT -----  Patient called with possible UTI/ bladder infection symptoms including frequent urination & burning, dull ache in pelvic area; onset >1 wk. Pt denies fever. Pt advised they opted for call back through clinical phone line but no prior encounter/ call hub reflected.  Attempted warm transfer

## 2024-07-19 ENCOUNTER — TELEPHONE (OUTPATIENT)
Age: 59
End: 2024-07-19

## 2024-07-19 DIAGNOSIS — F41.9 ANXIETY: ICD-10-CM

## 2024-07-19 RX ORDER — ALPRAZOLAM 0.5 MG/1
0.5 TABLET ORAL
Qty: 30 TABLET | Refills: 0 | Status: SHIPPED | OUTPATIENT
Start: 2024-07-19 | End: 2024-08-18

## 2024-07-19 NOTE — TELEPHONE ENCOUNTER
"Patient called for UA results. Results provided per Seamus Cutler   \"UA WNL - no signs of infection so culture not warranted; if burning continues - she can schedule an appointment for vulvo-vaginal infection \"    Patient declined to schedule appointment at this time. She states she will call back to schedule. She would like to have pelvis ultrasound done first and will then schedule appointment.   "

## 2024-07-19 NOTE — TELEPHONE ENCOUNTER
1 [id number] 06/18/2024 06/18/2024 ALPRAZolam (Tablet) 30.0 30 0.5 MG NA [employer] Commercial Insurance 0 / 0 [place]    1 [id number] 05/20/2024 05/20/2024 ALPRAZolam (Tablet) 30.0 30 0.5 MG NA MELISSA [employer] Commercial Insurance 0 / 0 [place]    1 [id number] 04/22/2024 04/22/2024 ALPRAZolam (Tablet) 30.0 30 0.5 MG NA MELISSA [employer] Commercial Insurance 0 / 0 [place]    1 [id number] 03/22/2024 03/22/2024 ALPRAZolam (Tablet) 30.0 30 0.5 MG NA MELISSA [employer] Commercial Insurance 0 / 0 [place]    1 [id number] 02/19/2024 02/19/2024 ALPRAZolam (Tablet) 30.0 30 0.5 MG [employer] Commercial Insurance 0 / 0 [place]    1 [id number] 01/23/2024 01/23/2024 ALPRAZolam (Tablet) 30.0 30 0.5 MG [employer] Commercial Insurance 0 / 3 [place]    1 [id number] 12/21/2023 12/21/2023 ALPRAZolam (Tablet) 60.0 20 0.25 MG [employer] Commercial Insurance 0 / 0 [place]    1 [id number] 11/14/2023 11/14/2023 ALPRAZolam (Tablet) 60.0 20 0.25 MG NA MELISSA [employer] Commercial Insurance 0 / 0 [place]    1 [id number] 10/13/2023 10/13/2023 ALPRAZolam (Tablet) 60.0 20 0.25 MG NA MELISSA [employer] Commercial Insurance 0 / 0 [place]    1 [id number] 09/15/2023 09/15/2023 ALPRAZolam (Tablet) 60.0 20 0.25 MG NA [employer] [address] Commercial Insurance 0 / 0 [place]

## 2024-07-25 ENCOUNTER — HOSPITAL ENCOUNTER (OUTPATIENT)
Dept: RADIOLOGY | Age: 59
Discharge: HOME | End: 2024-07-25
Payer: COMMERCIAL

## 2024-07-25 DIAGNOSIS — R30.0 BURNING WITH URINATION: ICD-10-CM

## 2024-07-25 DIAGNOSIS — R19.00 PELVIC FULLNESS: ICD-10-CM

## 2024-07-25 PROCEDURE — 76856 US EXAM PELVIC COMPLETE: CPT

## 2024-07-25 PROCEDURE — 76830 TRANSVAGINAL US NON-OB: CPT

## 2024-07-28 DIAGNOSIS — F32.A DEPRESSION, UNSPECIFIED DEPRESSION TYPE: ICD-10-CM

## 2024-07-28 RX ORDER — CITALOPRAM 40 MG/1
40 TABLET ORAL DAILY
Qty: 90 TABLET | Refills: 1 | Status: SHIPPED | OUTPATIENT
Start: 2024-07-28

## 2024-07-30 ENCOUNTER — TELEPHONE (OUTPATIENT)
Age: 59
End: 2024-07-30

## 2024-07-30 DIAGNOSIS — Z12.31 ENCOUNTER FOR SCREENING MAMMOGRAM FOR MALIGNANT NEOPLASM OF BREAST: Primary | ICD-10-CM

## 2024-07-30 DIAGNOSIS — Z12.31 SCREENING MAMMOGRAM, ENCOUNTER FOR: Primary | ICD-10-CM

## 2024-07-30 DIAGNOSIS — R92.333 HETEROGENEOUSLY DENSE TISSUE OF BOTH BREASTS ON MAMMOGRAPHY: ICD-10-CM

## 2024-07-30 NOTE — TELEPHONE ENCOUNTER
Patient has annual scheduled 10/16 with Seamus Omayra  Order placed for screening abhijit with cd and cad  Needs signed

## 2024-07-30 NOTE — TELEPHONE ENCOUNTER
Please let patient know I placed order for both the screening mammogram and screening breast ultrasound due to her very dense breast tissue. It is recommended she check with insurance for coverage of the screening breast ultrasound prior to completing. She did have both imaging studies done last year

## 2024-07-30 NOTE — TELEPHONE ENCOUNTER
Instructions given to patient regarding mammogram and ultrasound. Patient will check with insurance regarding abus

## 2024-07-30 NOTE — TELEPHONE ENCOUNTER
Patient requesting a script for her yearly mammogram.  Last mammogram 8/22/23.  Yearly exam scheduled for 10/16/24.

## 2024-08-18 DIAGNOSIS — F41.9 ANXIETY: ICD-10-CM

## 2024-08-19 RX ORDER — ALPRAZOLAM 0.5 MG
0.5 TABLET ORAL
Qty: 30 TABLET | Refills: 0 | Status: SHIPPED | OUTPATIENT
Start: 2024-08-19 | End: 2024-09-18

## 2024-08-19 NOTE — TELEPHONE ENCOUNTER
1 [id number] 07/19/2024 07/19/2024 ALPRAZolam (Tablet) 30.0 30 0.5 MG NA [employer] Commercial Insurance 0 / 0 [place]    1 [id number] 06/18/2024 06/18/2024 ALPRAZolam (Tablet) 30.0 30 0.5 MG NA MELISSA [employer] Commercial Insurance 0 / 0 [place]    1 [id number] 05/20/2024 05/20/2024 ALPRAZolam (Tablet) 30.0 30 0.5 MG NA [employer] Commercial Insurance 0 / 0 [place]    1 [id number] 04/22/2024 04/22/2024 ALPRAZolam (Tablet) 30.0 30 0.5 MG NA MELISSA [employer] Commercial Insurance 0 / 0 [place]    1 [id number] 03/22/2024 03/22/2024 ALPRAZolam (Tablet) 30.0 30 0.5 MG [employer] Commercial Insurance 0 / 0 [place]    1 [id number] 02/19/2024 02/19/2024 ALPRAZolam (Tablet) 30.0 30 0.5 MG [employer] Commercial Insurance 0 / 0 [place]    1 [id number] 01/23/2024 01/23/2024 ALPRAZolam (Tablet) 30.0 30 0.5 MG NA [employer] Commercial Insurance 0 / 3 [place]    1 [id number] 12/21/2023 12/21/2023 ALPRAZolam (Tablet) 60.0 20 0.25 MG NA MELISSA [employer] Commercial Insurance 0 / 0 [place]    1 [id number] 11/14/2023 11/14/2023 ALPRAZolam (Tablet) 60.0 20 0.25 MG NA MELISSA [employer] Commercial Insurance 0 / 0 [place]    1 [id number] 10/13/2023 10/13/2023 ALPRAZolam (Tablet) 60.0 20 0.25 MG NA [employer] Commercial Insurance 0 / 0 [place]

## 2024-09-17 DIAGNOSIS — F41.9 ANXIETY: ICD-10-CM

## 2024-09-18 RX ORDER — ALPRAZOLAM 0.5 MG
0.5 TABLET ORAL
Qty: 30 TABLET | Refills: 0 | Status: SHIPPED | OUTPATIENT
Start: 2024-09-18 | End: 2024-10-18

## 2024-09-18 NOTE — TELEPHONE ENCOUNTER
1 [id number] 08/19/2024 08/19/2024 ALPRAZolam (Tablet) 30.0 30 0.5 MG NA LATONIA RAHMAN [employer] 0 / 0 [place]    1 [id number] 07/19/2024 07/19/2024 ALPRAZolam (Tablet) 30.0 30 0.5 MG NA MELISSA [employer] 0 / 0 [place]    1 [id number] 06/18/2024 06/18/2024 ALPRAZolam (Tablet) 30.0 30 0.5 MG NA MELISSA [employer] 0 / 0 [place]    1 [id number] 05/20/2024 05/20/2024 ALPRAZolam (Tablet) 30.0 30 0.5 MG NA MELISSA [employer] 0 / 0 [place]    1 [id number] 04/22/2024 04/22/2024 ALPRAZolam (Tablet) 30.0 30 0.5 MG NA MELISSA [employer] 0 / 0 [place]    1 [id number] 03/22/2024 03/22/2024 ALPRAZolam (Tablet) 30.0 30 0.5 MG NA MELISSA [employer] 0 / 0 [place]    1 [id number] 02/19/2024 02/19/2024 ALPRAZolam (Tablet) 30.0 30 0.5 MG NA MELISSA [employer] 0 / 0 [place]    1 [id number] 01/23/2024 01/23/2024 ALPRAZolam (Tablet) 30.0 30 0.5 MG NA MELISSA [employer] 0 / 3 [place]    1 [id number] 12/21/2023 12/21/2023 ALPRAZolam (Tablet) 60.0 20 0.25 MG NA MELISSA [employer] 0 / 0 [place]    1 [id number] 11/14/2023 11/14/2023 ALPRAZolam (Tablet) 60.0 20 0.25 MG NA MELISSA [employer] Commercial Insurance 0 / 0 [place]

## 2024-10-03 ENCOUNTER — OFFICE VISIT (OUTPATIENT)
Dept: FAMILY MEDICINE CLINIC | Facility: CLINIC | Age: 59
End: 2024-10-03
Payer: COMMERCIAL

## 2024-10-03 VITALS
BODY MASS INDEX: 25.65 KG/M2 | SYSTOLIC BLOOD PRESSURE: 124 MMHG | WEIGHT: 139.4 LBS | DIASTOLIC BLOOD PRESSURE: 84 MMHG | TEMPERATURE: 97.9 F | HEART RATE: 76 BPM | HEIGHT: 62 IN | OXYGEN SATURATION: 97 %

## 2024-10-03 DIAGNOSIS — M53.3 SACROILIAC DYSFUNCTION: ICD-10-CM

## 2024-10-03 DIAGNOSIS — R06.00 DYSPNEA, UNSPECIFIED TYPE: Primary | ICD-10-CM

## 2024-10-03 PROCEDURE — 94010 BREATHING CAPACITY TEST: CPT | Performed by: FAMILY MEDICINE

## 2024-10-03 PROCEDURE — 99214 OFFICE O/P EST MOD 30 MIN: CPT | Performed by: FAMILY MEDICINE

## 2024-10-03 RX ORDER — PREDNISONE 10 MG/1
TABLET ORAL
Qty: 26 TABLET | Refills: 0 | Status: SHIPPED | OUTPATIENT
Start: 2024-10-03 | End: 2024-10-16 | Stop reason: ALTCHOICE

## 2024-10-03 RX ORDER — CELECOXIB 200 MG/1
200 CAPSULE ORAL 2 TIMES DAILY PRN
Qty: 60 CAPSULE | Refills: 2 | Status: SHIPPED | OUTPATIENT
Start: 2024-10-03

## 2024-10-09 NOTE — PROGRESS NOTES
Patient ID: Cristal Leong is a 59 y.o. female.    HPI: 59 y.o.female presents for evaluation of dyspnea which is both at rest and with exertion.  She denies any wheezing or cough but just has a sensation that she cannot take a full breath.  She denies any palpitations, tach tachycardia, or chest pain.  She has a feeling it may be due to external allergens because she particularly has issues if she is outside for long periods.  In addition, the patient complains of of a flareup of sacroiliac pain on the left.  She denies any radiation of pain down legs or paresthesia/instability.    SUBJECTIVE    Family History   Problem Relation Age of Onset    Breast cancer Mother 65        dx in 60's    Other Mother         stenosis    Osteoporosis Mother     Cancer Mother     Scleroderma Father     Breast cancer Maternal Grandmother         dx in 80's    Other Family         back disorder    No Known Problems Daughter     Lung cancer Maternal Aunt     No Known Problems Maternal Grandfather     No Known Problems Paternal Grandmother     No Known Problems Paternal Grandfather     No Known Problems Son     No Known Problems Maternal Aunt      Social History     Socioeconomic History    Marital status: /Civil Union     Spouse name: Not on file    Number of children: Not on file    Years of education: Not on file    Highest education level: Not on file   Occupational History    Not on file   Tobacco Use    Smoking status: Never     Passive exposure: Past    Smokeless tobacco: Never   Vaping Use    Vaping status: Never Used   Substance and Sexual Activity    Alcohol use: Yes     Alcohol/week: 10.0 standard drinks of alcohol     Types: 10 Glasses of wine per week     Comment: daily    Drug use: No    Sexual activity: Yes     Partners: Male   Other Topics Concern    Not on file   Social History Narrative    Drinks coffee daily     Drinks cola daily    Drinks tea daily     Social Determinants of Health     Financial Resource  Strain: Not on file   Food Insecurity: Not on file   Transportation Needs: Not on file   Physical Activity: Not on file   Stress: Not on file   Social Connections: Not on file   Intimate Partner Violence: Not on file   Housing Stability: Not on file     Past Medical History:   Diagnosis Date    Allergic     Anxiety     Arthritis     Asthma     Cancer (HCC)     Skin.. basal cell carcinoma    Fibroid     20 plus years ago..one in each breast    GERD (gastroesophageal reflux disease)     Irritable bowel syndrome     Migraine     Occasional    PONV (postoperative nausea and vomiting)     Skin cancer     Urinary tract infection     Occasionally     Past Surgical History:   Procedure Laterality Date    BREAST BIOPSY      20 plus years ago...fibroids    BREAST CYST ASPIRATION Bilateral     BREAST SURGERY Bilateral     cyst removal    COLONOSCOPY      COLPOSCOPY  2019    CT ESOPHAGOGASTRODUODENOSCOPY TRANSORAL DIAGNOSTIC N/A 1/2/2018    Procedure: EGD AND COLONOSCOPY;  Surgeon: Everette Jorgensen MD;  Location: [facility];  Service: Gastroenterology    TONSILLECTOMY      WISDOM TOOTH EXTRACTION       No Known Allergies    Current Outpatient Medications:     ALPRAZolam (XANAX) 0.5 mg tablet, Take 1 tablet (0.5 mg total) by mouth daily at bedtime as needed for anxiety, Disp: 30 tablet, Rfl: 0    celecoxib (CeleBREX) 200 mg capsule, Take 1 capsule (200 mg total) by mouth 2 (two) times a day as needed for mild pain, Disp: 60 capsule, Rfl: 2    cetirizine (ZyrTEC) 10 mg tablet, Take by mouth, Disp: , Rfl:     Cholecalciferol (VITAMIN D3) 2000 units TABS, Take 1 tablet by mouth daily, Disp: , Rfl:     citalopram (CeleXA) 40 mg tablet, TAKE 1 TABLET BY MOUTH EVERY DAY, Disp: 90 tablet, Rfl: 1    Ibuprofen (MOTRIN PO), Take by mouth, Disp: , Rfl:     pantoprazole (PROTONIX) 40 mg tablet, TAKE 1 TABLET BY MOUTH TWO TIMES DAILY, Disp: 60 tablet, Rfl: 5    predniSONE 10 mg tablet, 3 tabs po bid x2 days, then 2 tabs po bid x2 days,  "then 1 tab bid x2 days, then 1 daily until done., Disp: 26 tablet, Rfl: 0    rosuvastatin (CRESTOR) 10 MG tablet, TAKE 1 TABLET BY MOUTH EVERY DAY, Disp: 30 tablet, Rfl: 5    Review of Systems  Constitutional:     Denies fever, chills ,fatigue ,weakness ,weight loss, weight gain     ENT: Denies earache ,loss of hearing ,nosebleed, nasal discharge,nasal congestion ,sore throat ,hoarseness  Pulmonary: + shortness of breath ,denies any cough  ,dyspnea on exertion, orthopnea  ,PND   Cardiovascular:  Denies bradycardia , tachycardia  ,palpations, lower extremity edema leg, claudication  Breast:  Denies new or changing breast lumps ,nipple discharge ,nipple changes  Abdomen:  Denies abdominal pain , anorexia , indigestion, nausea, vomiting, constipation, diarrhea  Musculoskeletal: Denies myalgias,  joint swelling, joint stiffness , limb pain, limb swelling left SI joint pain+   Gu: denies dysuria, polyuria  Skin: Denies skin rash, skin lesion, skin wound, itching, dry skin  Neuro: Denies headache, numbness, tingling, confusion, loss of consciousness, dizziness, vertigo  Psychiatric: Denies feelings of depression, suicidal ideation, anxiety, sleep disturbances    OBJECTIVE  /84   Pulse 76   Temp 97.9 °F (36.6 °C)   Ht 5' 2\" (1.575 m)   Wt 63.2 kg (139 lb 6.4 oz)   SpO2 97%   BMI 25.50 kg/m²   Constitutional:   NAD, well appearing and well nourished      ENT:   Conjunctiva and lids: no injection, edema, or discharge     Pupils and iris: NAVDEEP bilaterally    External inspection of ears and nose: normal without deformities or discharge.      Otoscopic exam: Canals patent without erythema.       Nasal mucosa, septum and turbinates: Normal or edema or discharge         Oropharynx:  Moist mucosa, normal tongue and tonsils without lesions. No erythema        Pulmonary:Respiratory effort normal rate and rhythm, no increased work of breathing. Auscultation of lungs:  Clear bilaterally with no adventitious breath sounds  "      Cardiovascular: regular rate and rhythm, S1 and S2, no murmur, no edema and/or varicosities of LE      Abdomen: Soft and non-distended     Positive bowel sounds      No heptomegaly or splenomegaly      Gu: no suprapubic tenderness or CVA tenderness, no urethral discharge  Lymphatic:  No anterior or posterior cervical lymphadenopathy         Musculoskeletal:  Gait and station: Normal gait      Digits and nails normal without clubbing or cyanosis       Inspection/palpation of joints, bones, and muscles:  + left SI joint tenderness, on palpation  , full active and passive range of motion of the lumbosacral spine     Skin: Normal skin turgor and no rashes      Neuro:       Normal reflexes      Psych:   alert and oriented to person, place and time     normal mood and affect       Assessment/Plan:Diagnoses and all orders for this visit:    Dyspnea, unspecified type  -     POCT spirometry  -     predniSONE 10 mg tablet; 3 tabs po bid x2 days, then 2 tabs po bid x2 days, then 1 tab bid x2 days, then 1 daily until done.    Sacroiliac dysfunction  -     celecoxib (CeleBREX) 200 mg capsule; Take 1 capsule (200 mg total) by mouth 2 (two) times a day as needed for mild pain    Other orders  -     POCT spirometry        Reviewed with patient plan to treat with above plan.    Patient instructed to call in 72 hours if not feeling better or if symptoms worsen

## 2024-10-15 NOTE — PROGRESS NOTES
Assessment & Plan   Diagnoses and all orders for this visit:    Encounter for gynecological examination (general) (routine) with abnormal findings  -     Liquid-based pap, screening  The current ASCCP guidelines were reviewed. Patient's last pap was 2/25/20 - WNL (-) HRHPV type 16/18 neg and therefore, a pap with HPV cotesting is indicated at this time. I emphasized the importance of an annual pelvic and breast exam. Patient ok to have a pap done today.    Vaginal atrophy  I reviewed the pathophysiology of vaginal atrophy and the OTC remedies - encourage vaginal moisturizers like luvena or replense/coconut oil or crisco applied into vagina couple times/week and the use of plain lubricant with IC. Can consider trial of Revaree or hyaluronic acid vaginal inserts. Reviewed the treatments, including vaginal estrogen (cream, suppositories), Osphena (non-estrogen oral medication), Intrarosa (non-estrogen steroid vaginal suppository) and expectant management with the risks, benefits and side effects of each being emphasized.  The patient elects to trial estrogen cream again - still has at home but will check for expiration and reach out if needs a new script.  All questions were answered to her satisfaction.    Discussion  I have discussed the importance of monthly self-breast exams, exercise and healthy diet as well as adequate intake of calcium and vitamin D. Encourage at least 1200 mg calcium citrate + 2000 IUs vitamin D3 divided through diet and supplement throughout the day; Encourage 30-40 min weight bearing exercise most days of week  STI testing - declines  A yearly mammogram is recommended for breast cancer screening starting at age 40 - has screening mammo and ABUS scheduled for the near future. In addition, colon cancer screening with a colonoscopy is recommended starting at age 45-50 and reviewed benefits - she is up to date with screening.  All questions have been answered to her satisfaction  RTO for APE or  sooner if needed    Subjective     HPI   Cristal Leong is a 59 y.o. female who presents for annual well woman exam. 7/25/24 - pelvic ultrasound done - endometrium 3 mm; uterine adenomyosis; otherwise unremarkable; Pain has resolved for the past 2 months.  LMP - 2/2019 - age 54; Denies  bleeding  No vulvar itch/burn; No vaginal itch/burn; No abn discharge or odor; No urinary sx - burning/pain/frequency/hematuria  (+) SBEs - no breast masses, asymmetry, nipple discharge or bleeding, changes in skin of breast, or breast tenderness bilaterally  No abd/pelvic pain or HAs;   (+) menopausal symptoms: (+) hot flashes - typically occur due to a trigger (coffee or wine); (+) night sweats still persisting - decided to d/c Low dose Femhrt since last annual - didn't see a significant difference on vs off; (+) problems with intercourse, vaginal dryness - significant and bothered by - rarely has sex anymore which she finds disheartening; has tried coconut oil, replens, [place] without any relief in the past; given script for estrace vaginal at last annual - didn't take for long - didn't think it made a difference but unsure if she gave it a fair chance;   Pt is rarely sexually active in a mutually monog/ sexual relationship; She declines sti/hiv/hep testing; Feels safe at home  (+) PCP for routine Bw/care;    Last Pap - 2/25/20 - WNL (-) HRHPV type 16/18 neg  History of abnormal Pap smear: no  Last mammo - 8/22/23 - B/L mammo and ABUS - Birads 1 negative; Type C density; scheduled  History of abnormal mammogram: yes  Last colonoscopy - 1/2/18 - follow-up 10 years    Review of Systems   Constitutional:  Negative for activity change, fatigue, fever and unexpected weight change.   HENT:  Negative for congestion, dental problem, sinus pressure and sinus pain.    Eyes:  Negative for visual disturbance.   Respiratory:  Negative for cough, shortness of breath and wheezing.    Cardiovascular:  Negative for chest pain and leg  swelling.   Gastrointestinal:  Negative for abdominal distention, abdominal pain, blood in stool, constipation, diarrhea, nausea and vomiting.   Endocrine: Negative for polydipsia.   Genitourinary:  Positive for dyspareunia. Negative for difficulty urinating, dysuria, frequency, hematuria, menstrual problem, pelvic pain, urgency, vaginal bleeding, vaginal discharge and vaginal pain.   Musculoskeletal:  Negative for arthralgias and back pain.   Allergic/Immunologic: Negative for environmental allergies.   Neurological:  Negative for dizziness, seizures and headaches.   Psychiatric/Behavioral:  Negative for dysphoric mood and sleep disturbance. The patient is not nervous/anxious.        The following portions of the patient's history were reviewed and updated as appropriate: allergies, current medications, past family history, past medical history, past social history, past surgical history, and problem list.         OB History          1    Para   1    Term   1            AB        Living   1         SAB        IAB        Ectopic        Multiple        Live Births   1           Obstetric Comments   :   F               Past Medical History:   Diagnosis Date    Allergic     Anxiety     Arthritis     Asthma     Cancer (HCC)     Skin.. basal cell carcinoma    Fibroid     20 plus years ago..one in each breast    GERD (gastroesophageal reflux disease)     Irritable bowel syndrome     Migraine     Occasional    PONV (postoperative nausea and vomiting)     Skin cancer     Urinary tract infection     Occasionally       Past Surgical History:   Procedure Laterality Date    BREAST BIOPSY      20 plus years ago...fibroids    BREAST CYST ASPIRATION Bilateral     BREAST SURGERY Bilateral     cyst removal    COLONOSCOPY      COLPOSCOPY      NH ESOPHAGOGASTRODUODENOSCOPY TRANSORAL DIAGNOSTIC N/A 2018    Procedure: EGD AND COLONOSCOPY;  Surgeon: Everette Jorgensen MD;  Location: [facility];  Service:  Gastroenterology    TONSILLECTOMY      WISDOM TOOTH EXTRACTION         Family History   Problem Relation Age of Onset    Breast cancer Mother 65        dx in 60's    Other Mother         stenosis    Osteoporosis Mother     Cancer Mother     Scleroderma Father     Breast cancer Maternal Grandmother         dx in 80's    Other Family         back disorder    No Known Problems Daughter     Lung cancer Maternal Aunt     No Known Problems Maternal Grandfather     No Known Problems Paternal Grandmother     No Known Problems Paternal Grandfather     No Known Problems Son     No Known Problems Maternal Aunt        Social History     Socioeconomic History    Marital status: /Civil Union     Spouse name: Not on file    Number of children: Not on file    Years of education: Not on file    Highest education level: Not on file   Occupational History    Not on file   Tobacco Use    Smoking status: Never     Passive exposure: Past    Smokeless tobacco: Never   Vaping Use    Vaping status: Never Used   Substance and Sexual Activity    Alcohol use: Yes     Alcohol/week: 10.0 standard drinks of alcohol     Types: 10 Glasses of wine per week     Comment: daily    Drug use: No    Sexual activity: Yes     Partners: Male   Other Topics Concern    Not on file   Social History Narrative    Drinks coffee daily     Drinks cola daily    Drinks tea daily     Social Determinants of Health     Financial Resource Strain: Not on file   Food Insecurity: Not on file   Transportation Needs: Not on file   Physical Activity: Not on file   Stress: Not on file   Social Connections: Not on file   Intimate Partner Violence: Not on file   Housing Stability: Not on file         Current Outpatient Medications:     ALPRAZolam (XANAX) 0.5 mg tablet, Take 1 tablet (0.5 mg total) by mouth daily at bedtime as needed for anxiety, Disp: 30 tablet, Rfl: 0    celecoxib (CeleBREX) 200 mg capsule, Take 1 capsule (200 mg total) by mouth 2 (two) times a day as  "needed for mild pain, Disp: 60 capsule, Rfl: 2    cetirizine (ZyrTEC) 10 mg tablet, Take by mouth, Disp: , Rfl:     Cholecalciferol (VITAMIN D3) 2000 units TABS, Take 1 tablet by mouth daily, Disp: , Rfl:     citalopram (CeleXA) 40 mg tablet, TAKE 1 TABLET BY MOUTH EVERY DAY, Disp: 90 tablet, Rfl: 1    Ibuprofen (MOTRIN PO), Take by mouth, Disp: , Rfl:     pantoprazole (PROTONIX) 40 mg tablet, TAKE 1 TABLET BY MOUTH TWO TIMES DAILY, Disp: 60 tablet, Rfl: 5    rosuvastatin (CRESTOR) 10 MG tablet, TAKE 1 TABLET BY MOUTH EVERY DAY, Disp: 30 tablet, Rfl: 5    No Known Allergies    Objective   Vitals:    10/16/24 0933   BP: 128/76   BP Location: Left arm   Patient Position: Sitting   Cuff Size: Standard   Weight: 63.5 kg (140 lb)   Height: 5' 2\" (1.575 m)     Physical Exam  Vitals reviewed.   Constitutional:       General: She is awake. She is not in acute distress.     Appearance: Normal appearance. She is well-developed and well-groomed. She is not ill-appearing, toxic-appearing or diaphoretic.   HENT:      Head: Normocephalic and atraumatic.   Eyes:      Conjunctiva/sclera: Conjunctivae normal.   Neck:      Thyroid: No thyroid mass, thyromegaly or thyroid tenderness.   Cardiovascular:      Rate and Rhythm: Normal rate and regular rhythm.      Heart sounds: Normal heart sounds. No murmur heard.  Pulmonary:      Effort: Pulmonary effort is normal. No tachypnea, bradypnea or respiratory distress.      Breath sounds: Normal breath sounds. No stridor or decreased air movement. No wheezing.   Chest:   Breasts:     Breasts are symmetrical.      Right: Normal. No swelling, bleeding, inverted nipple, mass, nipple discharge, skin change or tenderness.      Left: Normal. No swelling, bleeding, inverted nipple, mass, nipple discharge, skin change or tenderness.   Abdominal:      General: There is no distension.      Palpations: Abdomen is soft. There is no hepatomegaly, splenomegaly or mass.      Tenderness: There is no abdominal " tenderness.      Hernia: No hernia is present. There is no hernia in the left inguinal area or right inguinal area.   Genitourinary:     General: Normal vulva.      Exam position: Supine.      Pubic Area: No rash or pubic lice.       Labia:         Right: No rash, tenderness, lesion or injury.         Left: No rash, tenderness, lesion or injury.       Urethra: No prolapse, urethral pain, urethral swelling or urethral lesion.      Vagina: No signs of injury and foreign body. Erythema (significant vaginal atrophy noted) present. No vaginal discharge, tenderness, bleeding, lesions or prolapsed vaginal walls.      Cervix: No cervical motion tenderness, discharge, friability, lesion, erythema or cervical bleeding.      Uterus: Not deviated, not enlarged, not fixed, not tender and no uterine prolapse.       Adnexa:         Right: No mass, tenderness or fullness.          Left: No mass, tenderness or fullness.        Comments: Rectal exam and hemoccult stool card deferred by patient and provider since up to date on colon cancer screening  Pelvic exam limited due to significant vaginal atrophy and discomfort with speculum exam  Lymphadenopathy:      Cervical: No cervical adenopathy.      Upper Body:      Right upper body: No supraclavicular or axillary adenopathy.      Left upper body: No supraclavicular or axillary adenopathy.      Lower Body: No right inguinal adenopathy. No left inguinal adenopathy.   Skin:     General: Skin is warm and dry.   Neurological:      Mental Status: She is alert and oriented to person, place, and time.   Psychiatric:         Mood and Affect: Mood and affect normal.         Speech: Speech normal.         Behavior: Behavior normal. Behavior is cooperative.         Thought Content: Thought content normal.         Judgment: Judgment normal.         Patient Instructions   Vaginal atrophy:   (1) look into Intrarosa vaginal inserts as an alternate prescription to the vaginal estrogen  replacement.  (2) look into Revaree vaginal inserts (not a prescription)

## 2024-10-16 ENCOUNTER — ANNUAL EXAM (OUTPATIENT)
Dept: OBGYN CLINIC | Facility: CLINIC | Age: 59
End: 2024-10-16
Payer: COMMERCIAL

## 2024-10-16 VITALS
SYSTOLIC BLOOD PRESSURE: 128 MMHG | HEIGHT: 62 IN | DIASTOLIC BLOOD PRESSURE: 76 MMHG | BODY MASS INDEX: 25.76 KG/M2 | WEIGHT: 140 LBS

## 2024-10-16 DIAGNOSIS — F41.9 ANXIETY: ICD-10-CM

## 2024-10-16 DIAGNOSIS — Z01.411 ENCOUNTER FOR GYNECOLOGICAL EXAMINATION (GENERAL) (ROUTINE) WITH ABNORMAL FINDINGS: Primary | ICD-10-CM

## 2024-10-16 DIAGNOSIS — N95.2 VAGINAL ATROPHY: ICD-10-CM

## 2024-10-16 PROCEDURE — S0612 ANNUAL GYNECOLOGICAL EXAMINA: HCPCS | Performed by: PHYSICIAN ASSISTANT

## 2024-10-16 PROCEDURE — G0145 SCR C/V CYTO,THINLAYER,RESCR: HCPCS | Performed by: PHYSICIAN ASSISTANT

## 2024-10-16 PROCEDURE — G0476 HPV COMBO ASSAY CA SCREEN: HCPCS | Performed by: PHYSICIAN ASSISTANT

## 2024-10-16 PROCEDURE — 99213 OFFICE O/P EST LOW 20 MIN: CPT | Performed by: PHYSICIAN ASSISTANT

## 2024-10-16 RX ORDER — ALPRAZOLAM 0.5 MG
0.5 TABLET ORAL
Qty: 30 TABLET | Refills: 0 | Status: SHIPPED | OUTPATIENT
Start: 2024-10-16 | End: 2024-11-15

## 2024-10-16 NOTE — ASSESSMENT & PLAN NOTE
I reviewed the pathophysiology of vaginal atrophy and the OTC remedies - encourage vaginal moisturizers like luvena or replense/coconut oil or crisco applied into vagina couple times/week and the use of plain lubricant with IC. Can consider trial of Revaree or hyaluronic acid vaginal inserts. Reviewed the treatments, including vaginal estrogen (cream, suppositories), Osphena (non-estrogen oral medication), Intrarosa (non-estrogen steroid vaginal suppository) and expectant management with the risks, benefits and side effects of each being emphasized.  The patient elects to trial estrogen cream again - still has at home but will check for expiration and reach out if needs a new script.  All questions were answered to her satisfaction.

## 2024-10-16 NOTE — PATIENT INSTRUCTIONS
Vaginal atrophy:   (1) look into Intrarosa vaginal inserts as an alternate prescription to the vaginal estrogen replacement.  (2) look into Revaree vaginal inserts (not a prescription)

## 2024-10-16 NOTE — TELEPHONE ENCOUNTER
[id number] 09/18/2024 09/18/2024 ALPRAZolam (Tablet) 30.0 30 0.5 MG NA MELISSA [employer] 0 / 0 [place]     1 [id number] 08/19/2024 08/19/2024 ALPRAZolam (Tablet) 30.0 30 0.5 MG NA LATONIA RAHMAN [employer] 0 / 0 [place]    1 [id number] 07/19/2024 07/19/2024 ALPRAZolam (Tablet) 30.0 30 0.5 MG NA MELISSA [employer] 0 / 0 [place]    1 [id number] 06/18/2024 06/18/2024 ALPRAZolam (Tablet) 30.0 30 0.5 MG NA MELISSA [employer] 0 / 0 [place]    1 [id number] 05/20/2024 05/20/2024 ALPRAZolam (Tablet) 30.0 30 0.5 MG NA MELISSA [employer] 0 / 0 [place]    1 [id number] 04/22/2024 04/22/2024 ALPRAZolam (Tablet) 30.0 30 0.5 MG NA MELISSA [employer] 0 / 0 [place]    1 [id number] 03/22/2024 03/22/2024 ALPRAZolam (Tablet) 30.0 30 0.5 MG NA MELISSA [employer] 0 / 0 [place]    1 [id number] 02/19/2024 02/19/2024 ALPRAZolam (Tablet) 30.0 30 0.5 MG NA MELISSA [employer] 0 / 0 [place]    1 [id number] 01/23/2024 01/23/2024 ALPRAZolam (Tablet) 30.0 30 0.5 MG NA MELISSA [employer] 0 / 3 [place]    1 [id number] 12/21/2023 12/21/2023 ALPRAZolam (Tablet) 60.0 20 0.25 MG NA MELISSA [employer] 0 / 0

## 2024-10-17 LAB
HPV HR 12 DNA CVX QL NAA+PROBE: NEGATIVE
HPV16 DNA CVX QL NAA+PROBE: NEGATIVE
HPV18 DNA CVX QL NAA+PROBE: NEGATIVE

## 2024-10-23 ENCOUNTER — HOSPITAL ENCOUNTER (OUTPATIENT)
Dept: MAMMOGRAPHY | Facility: CLINIC | Age: 59
Discharge: HOME | End: 2024-10-23
Payer: COMMERCIAL

## 2024-10-23 ENCOUNTER — HOSPITAL ENCOUNTER (OUTPATIENT)
Dept: ULTRASOUND IMAGING | Facility: CLINIC | Age: 59
Discharge: HOME | End: 2024-10-23
Payer: COMMERCIAL

## 2024-10-23 VITALS — HEIGHT: 62 IN | BODY MASS INDEX: 25.76 KG/M2 | WEIGHT: 140 LBS

## 2024-10-23 DIAGNOSIS — Z12.31 ENCOUNTER FOR SCREENING MAMMOGRAM FOR MALIGNANT NEOPLASM OF BREAST: ICD-10-CM

## 2024-10-23 DIAGNOSIS — R92.333 HETEROGENEOUSLY DENSE TISSUE OF BOTH BREASTS ON MAMMOGRAPHY: ICD-10-CM

## 2024-10-23 PROCEDURE — 76641 ULTRASOUND BREAST COMPLETE: CPT

## 2024-10-23 PROCEDURE — 77067 SCR MAMMO BI INCL CAD: CPT

## 2024-10-23 PROCEDURE — 77063 BREAST TOMOSYNTHESIS BI: CPT

## 2024-10-24 LAB
LAB AP GYN PRIMARY INTERPRETATION: NORMAL
Lab: NORMAL

## 2024-10-29 NOTE — ANESTHESIA POSTPROCEDURE EVALUATION
Post-Op Assessment Note      CV Status:  Stable    Mental Status:  Alert and awake    Hydration Status:  Euvolemic    PONV Controlled:  Controlled    Airway Patency:  Patent    Post Op Vitals Reviewed: Yes          Staff: CRNA           BP      Temp     Pulse     Resp      SpO2 normal for race

## 2024-11-18 DIAGNOSIS — F41.9 ANXIETY: ICD-10-CM

## 2024-11-18 NOTE — TELEPHONE ENCOUNTER
1 [id number] 10/16/2024 10/16/2024 ALPRAZolam (Tablet) 30.0 30 0.5 MG NA MELISSA [employer] Commercial Insurance 0 / 0 [place]    1 [id number] 09/18/2024 09/18/2024 ALPRAZolam (Tablet) 30.0 30 0.5 MG NA MELISSA [employer] Commercial Insurance 0 / 0 [place]    1 [id number] 08/19/2024 08/19/2024 ALPRAZolam (Tablet) 30.0 30 0.5 MG NA LATONIA RAHMAN [employer] Commercial Insurance 0 / 0 [place]    1 [id number] 07/19/2024 07/19/2024 ALPRAZolam (Tablet) 30.0 30 0.5 MG NA MELISSA [employer] Commercial Insurance 0 / 0 [place]    1 [id number] 06/18/2024 06/18/2024 ALPRAZolam (Tablet) 30.0 30 0.5 MG NA MELISSA [employer] Commercial Insurance 0 / 0 [place]    1 [id number] 05/20/2024 05/20/2024 ALPRAZolam (Tablet) 30.0 30 0.5 MG NA MELISSA [employer] Commercial Insurance 0 / 0 [place]    1 [id number] 04/22/2024 04/22/2024 ALPRAZolam (Tablet) 30.0 30 0.5 MG NA MELISSA [employer] Commercial Insurance 0 / 0 [place]    1 [id number] 03/22/2024 03/22/2024 ALPRAZolam (Tablet) 30.0 30 0.5 MG NA MELISSA [employer] Commercial Insurance 0 / 0 [place]    1 [id number] 02/19/2024 02/19/2024 ALPRAZolam (Tablet) 30.0 30 0.5 MG NA MELISSA [employer] Commercial Insurance 0 / 0 [place]    1 [id number] 01/23/2024 01/23/2024 ALPRAZolam (Tablet) 30.0 30 0.5 MG NA MELISSA BROADT [employer] Commercial Insurance 0 / 3 [place]

## 2024-11-19 RX ORDER — ALPRAZOLAM 0.5 MG
0.5 TABLET ORAL
Qty: 30 TABLET | Refills: 0 | Status: SHIPPED | OUTPATIENT
Start: 2024-11-19 | End: 2024-12-19

## 2024-12-20 DIAGNOSIS — F41.9 ANXIETY: ICD-10-CM

## 2024-12-23 RX ORDER — ALPRAZOLAM 0.5 MG
0.5 TABLET ORAL
Qty: 30 TABLET | Refills: 0 | Status: SHIPPED | OUTPATIENT
Start: 2024-12-23 | End: 2025-01-22

## 2024-12-23 NOTE — TELEPHONE ENCOUNTER
1 [id number] 11/19/2024 11/19/2024 ALPRAZolam (Tablet) 30.0 30 0.5 MG NA MELISSA [employer] Commercial Insurance 0 / 0 [place]    1 [id number] 10/16/2024 10/16/2024 ALPRAZolam (Tablet) 30.0 30 0.5 MG NA MELISSA [employer] Commercial Insurance 0 / 0 [place]    1 [id number] 09/18/2024 09/18/2024 ALPRAZolam (Tablet) 30.0 30 0.5 MG NA MELISSA [employer] Commercial Insurance 0 / 0 [place]    1 [id number] 08/19/2024 08/19/2024 ALPRAZolam (Tablet) 30.0 30 0.5 MG NA LATONIA RAHMAN [employer] Commercial Insurance 0 / 0 [place]    1 [id number] 07/19/2024 07/19/2024 ALPRAZolam (Tablet) 30.0 30 0.5 MG NA MELISSA [employer] Commercial Insurance 0 / 0 [place]    1 [id number] 06/18/2024 06/18/2024 ALPRAZolam (Tablet) 30.0 30 0.5 MG NA MELISSA [employer] Commercial Insurance 0 / 0 [place]    1 [id number] 05/20/2024 05/20/2024 ALPRAZolam (Tablet) 30.0 30 0.5 MG NA MELISSA [employer] Commercial Insurance 0 / 0 [place]    1 [id number] 04/22/2024 04/22/2024 ALPRAZolam (Tablet) 30.0 30 0.5 MG NA MELISSA [employer] Commercial Insurance 0 / 0 [place]    1 [id number] 03/22/2024 03/22/2024 ALPRAZolam (Tablet) 30.0 30 0.5 MG NA MELISSA [employer] Commercial Insurance 0 / 0 [place]    1 [id number] 02/19/2024 02/19/2024 ALPRAZolam (Tablet) 30.0 30 0.5 MG NA MELISSA BROADT [employer] Commercial Insurance 0 / 0 [place]

## 2024-12-24 DIAGNOSIS — E78.00 HYPERCHOLESTEROLEMIA: ICD-10-CM

## 2024-12-24 RX ORDER — ROSUVASTATIN CALCIUM 10 MG/1
10 TABLET, COATED ORAL DAILY
Qty: 30 TABLET | Refills: 5 | Status: SHIPPED | OUTPATIENT
Start: 2024-12-24

## 2025-01-23 DIAGNOSIS — F32.A DEPRESSION, UNSPECIFIED DEPRESSION TYPE: ICD-10-CM

## 2025-01-23 RX ORDER — CITALOPRAM HYDROBROMIDE 40 MG/1
40 TABLET ORAL DAILY
Qty: 90 TABLET | Refills: 1 | Status: SHIPPED | OUTPATIENT
Start: 2025-01-23

## 2025-02-24 DIAGNOSIS — K21.9 GASTROESOPHAGEAL REFLUX DISEASE WITHOUT ESOPHAGITIS: ICD-10-CM

## 2025-02-24 DIAGNOSIS — F41.9 ANXIETY: ICD-10-CM

## 2025-02-24 RX ORDER — ALPRAZOLAM 0.5 MG
0.5 TABLET ORAL
Qty: 30 TABLET | Refills: 0 | Status: SHIPPED | OUTPATIENT
Start: 2025-02-24 | End: 2025-03-26

## 2025-02-24 NOTE — TELEPHONE ENCOUNTER
1 [id number] 01/21/2025 01/21/2025 ALPRAZolam (Tablet) 30.0 30 0.5 MG NA MELISSA BROADT [employer] Commercial Insurance 0 / 0 [place]    1 [id number] 12/23/2024 12/23/2024 ALPRAZolam (Tablet) 30.0 30 0.5 MG NA ELSPETH BLACK-LOPEZ [employer] Commercial Insurance 0 / 0 [place]    1 [id number] 11/19/2024 11/19/2024 ALPRAZolam (Tablet) 30.0 30 0.5 MG NA MELISSA BROADT [employer] Commercial Insurance 0 / 0 [place]    1 [id number] 10/16/2024 10/16/2024 ALPRAZolam (Tablet) 30.0 30 0.5 MG NA MELISSA BROADT [employer] Commercial Insurance 0 / 0 [place]    1 [id number] 09/18/2024 09/18/2024 ALPRAZolam (Tablet) 30.0 30 0.5 MG NA MELISSA BROADT [employer] Commercial Insurance 0 / 0 [place]    1 [id number] 08/19/2024 08/19/2024 ALPRAZolam (Tablet) 30.0 30 0.5 MG NA LATONIA RAHMAN [employer] Commercial Insurance 0 / 0 [place]    1 [id number] 07/19/2024 07/19/2024 ALPRAZolam (Tablet) 30.0 30 0.5 MG NA MELISSA BROADT [employer] Commercial Insurance 0 / 0 [place]    1 [id number] 06/18/2024 06/18/2024 ALPRAZolam (Tablet) 30.0 30 0.5 MG NA MELISSA BROADT [employer] Commercial Insurance 0 / 0 [place]    1 [id number] 05/20/2024 05/20/2024 ALPRAZolam (Tablet) 30.0 30 0.5 MG NA MELISSA BROADT [employer] Commercial Insurance 0 / 0 [place]    1 [id number] 04/22/2024 04/22/2024 ALPRAZolam (Tablet) 30.0 30 0.5 MG NA MELISSA BROADT [employer] Commercial Insurance 0 / 0 [place]

## 2025-02-25 RX ORDER — PANTOPRAZOLE SODIUM 40 MG/1
TABLET, DELAYED RELEASE ORAL
Qty: 60 TABLET | Refills: 2 | Status: SHIPPED | OUTPATIENT
Start: 2025-02-25

## 2025-03-03 ENCOUNTER — TELEPHONE (OUTPATIENT)
Dept: FAMILY MEDICINE CLINIC | Facility: CLINIC | Age: 60
End: 2025-03-03

## 2025-03-03 ENCOUNTER — TELEPHONE (OUTPATIENT)
Age: 60
End: 2025-03-03

## 2025-03-03 NOTE — TELEPHONE ENCOUNTER
"Pt reports waking up this morning with bilateral ear pain and a \"massive\" HA    Pt is requesting an appointment today    Please advise   "

## 2025-03-04 ENCOUNTER — OFFICE VISIT (OUTPATIENT)
Dept: FAMILY MEDICINE CLINIC | Facility: CLINIC | Age: 60
End: 2025-03-04
Payer: COMMERCIAL

## 2025-03-04 ENCOUNTER — TELEPHONE (OUTPATIENT)
Age: 60
End: 2025-03-04

## 2025-03-04 VITALS
OXYGEN SATURATION: 98 % | HEART RATE: 78 BPM | DIASTOLIC BLOOD PRESSURE: 70 MMHG | TEMPERATURE: 97.2 F | WEIGHT: 138 LBS | BODY MASS INDEX: 25.4 KG/M2 | HEIGHT: 62 IN | SYSTOLIC BLOOD PRESSURE: 120 MMHG

## 2025-03-04 DIAGNOSIS — J01.90 ACUTE SINUSITIS, RECURRENCE NOT SPECIFIED, UNSPECIFIED LOCATION: Primary | ICD-10-CM

## 2025-03-04 DIAGNOSIS — R68.89 FLU-LIKE SYMPTOMS: ICD-10-CM

## 2025-03-04 LAB
SL AMB POCT RAPID FLU A: NORMAL
SL AMB POCT RAPID FLU B: NORMAL

## 2025-03-04 PROCEDURE — 99213 OFFICE O/P EST LOW 20 MIN: CPT | Performed by: FAMILY MEDICINE

## 2025-03-04 PROCEDURE — 87804 INFLUENZA ASSAY W/OPTIC: CPT | Performed by: FAMILY MEDICINE

## 2025-03-04 RX ORDER — PREDNISONE 10 MG/1
TABLET ORAL
Qty: 26 TABLET | Refills: 0 | Status: SHIPPED | OUTPATIENT
Start: 2025-03-04

## 2025-03-04 RX ORDER — CEFUROXIME AXETIL 500 MG/1
500 TABLET ORAL EVERY 12 HOURS SCHEDULED
Qty: 20 TABLET | Refills: 0 | Status: SHIPPED | OUTPATIENT
Start: 2025-03-04 | End: 2025-03-14

## 2025-03-04 NOTE — TELEPHONE ENCOUNTER
Shamar from [facility] states there is a drug interaction with Ceftin and Protonix. Would like a call back with ok to fill or resend a new medication . [phone]

## 2025-03-04 NOTE — TELEPHONE ENCOUNTER
PA for pantoprazole 40 mg  APPROVED     Date(s) approved 3/4/25-3/4/26    Case #[id number]     Patient advised by          [][employer] Message  []Phone call   [x]LMOM  []L/M to call office as no active Communication consent on file  []Unable to leave detailed message as VM not approved on Communication consent       Pharmacy advised by    [x]Fax  []Phone call  []Secure Chat    Specialty Pharmacy    []     Approval letter scanned into Media No

## 2025-03-04 NOTE — TELEPHONE ENCOUNTER
PA for pantoprazole SUBMITTED to [facility]     via    []CMM-KEY:   [x]Surescripts-Case ID # [id number]   []Availity-Auth ID # NDC #   []Faxed to plan   []Other website   []Phone call Case ID #     []PA sent as URGENT    All office notes, labs and other pertaining documents and studies sent. Clinical questions answered. Awaiting determination from insurance company.     Turnaround time for your insurance to make a decision on your Prior Authorization can take 7-21 business days.

## 2025-03-04 NOTE — TELEPHONE ENCOUNTER
Patient states she would rather have a different antibiotic. She cannot go more than 5 days without pantoprazole, it absolutely kills  her stomach

## 2025-03-06 NOTE — PROGRESS NOTES
Patient ID: Cristal Leong is a 59 y.o. female.      Diagnoses and all orders for this visit:    Acute sinusitis, recurrence not specified, unspecified location  -     predniSONE 10 mg tablet; 3 tabs po bid x2 days, then 2 tabs po bid x2 days, then 1 tab bid x2 days, then 1 daily until done.  -     cefuroxime (CEFTIN) 500 mg tablet; Take 1 tablet (500 mg total) by mouth every 12 (twelve) hours for 10 days    Flu-like symptoms  -     POCT rapid flu A and B  Reviewed with patient plan to treat with above plan.    Patient instructed to call in 72 hours if not feeling better or if symptoms worsen       HPI: 59 y.o.female presenting with symptoms of sinus pain,pressure, nasal congestion, pnd dry cough , ear and throat pain.      SUBJECTIVE    Family History   Problem Relation Age of Onset    Breast cancer Mother 65        dx in 60's    Other Mother         stenosis    Osteoporosis Mother     Cancer Mother     Scleroderma Father     Breast cancer Maternal Grandmother         dx in 80's    Other Family         back disorder    No Known Problems Daughter     Lung cancer Maternal Aunt     No Known Problems Maternal Grandfather     No Known Problems Paternal Grandmother     No Known Problems Paternal Grandfather     No Known Problems Son     No Known Problems Maternal Aunt      Social History     Socioeconomic History    Marital status: /Civil Union     Spouse name: Not on file    Number of children: Not on file    Years of education: Not on file    Highest education level: Not on file   Occupational History    Not on file   Tobacco Use    Smoking status: Never     Passive exposure: Past    Smokeless tobacco: Never   Vaping Use    Vaping status: Never Used   Substance and Sexual Activity    Alcohol use: Yes     Alcohol/week: 10.0 standard drinks of alcohol     Types: 10 Glasses of wine per week     Comment: daily    Drug use: No    Sexual activity: Yes     Partners: Male   Other Topics Concern    Not on file   Social  History Narrative    Drinks coffee daily     Drinks cola daily    Drinks tea daily     Social Drivers of Health     Financial Resource Strain: Not on file   Food Insecurity: Not on file   Transportation Needs: Not on file   Physical Activity: Not on file   Stress: Not on file   Social Connections: Not on file   Intimate Partner Violence: Not on file   Housing Stability: Not on file     Past Medical History:   Diagnosis Date    Allergic     Anxiety     Arthritis     Asthma     Cancer (HCC)     Skin.. basal cell carcinoma    Fibroid     20 plus years ago..one in each breast    GERD (gastroesophageal reflux disease)     Irritable bowel syndrome     Migraine     Occasional    PONV (postoperative nausea and vomiting)     Skin cancer     Urinary tract infection     Occasionally     Past Surgical History:   Procedure Laterality Date    BREAST BIOPSY      20 plus years ago...fibroids    BREAST CYST ASPIRATION Bilateral     BREAST SURGERY Bilateral     cyst removal    COLONOSCOPY      COLPOSCOPY  2019    VT ESOPHAGOGASTRODUODENOSCOPY TRANSORAL DIAGNOSTIC N/A 1/2/2018    Procedure: EGD AND COLONOSCOPY;  Surgeon: Everette Jorgensen MD;  Location: [facility];  Service: Gastroenterology    TONSILLECTOMY      WISDOM TOOTH EXTRACTION       No Known Allergies    Current Outpatient Medications:     ALPRAZolam (XANAX) 0.5 mg tablet, Take 1 tablet (0.5 mg total) by mouth daily at bedtime as needed for anxiety, Disp: 30 tablet, Rfl: 0    cefuroxime (CEFTIN) 500 mg tablet, Take 1 tablet (500 mg total) by mouth every 12 (twelve) hours for 10 days, Disp: 20 tablet, Rfl: 0    celecoxib (CeleBREX) 200 mg capsule, Take 1 capsule (200 mg total) by mouth 2 (two) times a day as needed for mild pain, Disp: 60 capsule, Rfl: 2    cetirizine (ZyrTEC) 10 mg tablet, Take by mouth, Disp: , Rfl:     Cholecalciferol (VITAMIN D3) 2000 units TABS, Take 1 tablet by mouth daily, Disp: , Rfl:     citalopram (CeleXA) 40 mg tablet, TAKE 1 TABLET BY MOUTH  "EVERY DAY, Disp: 90 tablet, Rfl: 1    Ibuprofen (MOTRIN PO), Take by mouth, Disp: , Rfl:     pantoprazole (PROTONIX) 40 mg tablet, TAKE 1 TABLET BY MOUTH TWO TIMES DAILY, Disp: 60 tablet, Rfl: 2    predniSONE 10 mg tablet, 3 tabs po bid x2 days, then 2 tabs po bid x2 days, then 1 tab bid x2 days, then 1 daily until done., Disp: 26 tablet, Rfl: 0    rosuvastatin (CRESTOR) 10 MG tablet, TAKE 1 TABLET BY MOUTH EVERY DAY, Disp: 30 tablet, Rfl: 5    Review of Systems  Constitutional:     Denies fever, chills, fatigue, weakness ,weight loss, weight gain      ENT: Denies earache, loss of hearing, nosebleed, nasal discharge,but complains of nasal congestion, sore throat,hoarseness and sinus pain and pressure    Pulmonary: Denies shortness of breath ,cough , dyspnea on exertionon, orthopnea ,+ PND   Cardiovascular:  Denies bradycardia , tachycardia ,palpations, lower extremity, edema leg, claudication  Breast:  Denies new or changing breast lumps,  nipple discharge, nipple changes,  Abdomen:  Denies abdominal pain , anorexia ,indigestion, nausea ,vomiting, constipation , diarrhea  Musculoskeletal: Denies myalgias, arthralgias, joint swelling, joint stiffness ,limb pain, limb swelling  Lymph:+ swollen glands  Gu: no dysuria or urinary frequency  Skin: Denies skin rash, skin lesion, skin wound, itching,dry skin  Neuro: Denies headache, numbness, tingling, confusion, loss of consciousness, dizziness ,vertigo  Psychiatric: Denies feelings of depression, suicidal ideation, anxiety, sleep disturbances    OBJECTIVE  /70   Pulse 78   Temp (!) 97.2 °F (36.2 °C)   Ht 5' 2\" (1.575 m)   Wt 62.6 kg (138 lb)   SpO2 98%   BMI 25.24 kg/m²   Constitutional:   NAD, well appearing and well nourished      ENT:   Conjunctiva and lids: no injection, edema, or discharge    Pupils and iris: NAVDEEP bilaterally   External inspection of ears and nose: normal without deformities or discharge.      Otoscopic exam: Canals patent ; tm are dull, " with with erythem and effusions  ENasal mucosa, septum and turbinates: Turbinae injection with discharge   Oropharynx:  Moist mucosa, normal tongue and tonsils without lesions.Erythema and injection  of post pharynx with pnd      Pulmonary:Respiratory effort normal rate and rhythm, no increased work of breathing. Auscultation of lungs:  Clear bilaterally with no adventitious breath sounds       Cardiovascular: regular rate and rhythm, S1 and S2, no murmur, no edema and/or varicosities of LE      Abdomen: Soft and non-distended    Positive bowel sounds    No heptomegaly or splenomegaly    Lymphatic: Anterior  cervical lymphadenopathy         Muscskeletal:  Gait and station: Normal gait     Digits and nails normal without clubbing or cyanosis     Inspection/palpation of joints, bones, and muscles:  No joint tenderness, swelling, full active and passive range of motion      Gu: no suprabubic tenderness, CVA tenderness or urethral discharge  Skin: Normal skin turgor and no rashes    Neuro:    Normal reflexes   Psych:   alert and oriented to person, place and time  normal mood and affect

## 2025-03-24 DIAGNOSIS — F41.9 ANXIETY: ICD-10-CM

## 2025-03-24 RX ORDER — ALPRAZOLAM 0.5 MG
0.5 TABLET ORAL
Qty: 30 TABLET | Refills: 0 | Status: SHIPPED | OUTPATIENT
Start: 2025-03-24 | End: 2025-04-23

## 2025-03-24 NOTE — TELEPHONE ENCOUNTER
1 [id number] 02/24/2025 02/24/2025 ALPRAZolam (Tablet) 30.0 30 0.5 MG NA MELISSA BROADT [employer] Commercial Insurance 0 / 0 [place]    1 [id number] 01/21/2025 01/21/2025 ALPRAZolam (Tablet) 30.0 30 0.5 MG NA MELISSA BROADT [employer] Commercial Insurance 0 / 0 [place]    1 [id number] 12/23/2024 12/23/2024 ALPRAZolam (Tablet) 30.0 30 0.5 MG NA ELSPETH BLACK-LOPEZ [employer] Commercial Insurance 0 / 0 [place]    1 [id number] 11/19/2024 11/19/2024 ALPRAZolam (Tablet) 30.0 30 0.5 MG NA [employer]ISSA [employer] Commercial Insurance 0 / 0 [place]    1 [id number] 10/16/2024 10/16/2024 ALPRAZolam (Tablet) 30.0 30 0.5 MG NA MELISSA [employer] Commercial Insurance 0 / 0 [place]    1 [id number] 09/18/2024 09/18/2024 ALPRAZolam (Tablet) 30.0 30 0.5 MG NA MELISSA [employer] Commercial Insurance 0 / 0 [place]    1 [id number] 08/19/2024 08/19/2024 ALPRAZolam (Tablet) 30.0 30 0.5 MG NA LATONIARADHA RAHMAN [employer] Commercial Insurance 0 / 0 [place]    1 [id number] 07/19/2024 07/19/2024 ALPRAZolam (Tablet) 30.0 30 0.5 MG NA MELISSA BROAD [employer] Commercial Insurance 0 / 0 [place]    1 [id number] 06/18/2024 06/18/2024 ALPRAZolam (Tablet) 30.0 30 0.5 MG NA MELISSA BROADT [employer] Commercial Insurance 0 / 0 [place]    1 [id number] 05/20/2024 05/20/2024 ALPRAZolam (Tablet) 30.0 30 0.5 MG NA MELISSA [employer] Commercial Insurance 0 / 0

## 2025-04-27 DIAGNOSIS — F41.9 ANXIETY: ICD-10-CM

## 2025-04-28 RX ORDER — ALPRAZOLAM 0.5 MG
0.5 TABLET ORAL
Qty: 30 TABLET | Refills: 0 | Status: SHIPPED | OUTPATIENT
Start: 2025-04-28 | End: 2025-05-28

## 2025-06-01 DIAGNOSIS — F41.9 ANXIETY: ICD-10-CM

## 2025-06-02 RX ORDER — ALPRAZOLAM 0.5 MG
0.5 TABLET ORAL
Qty: 30 TABLET | Refills: 0 | Status: SHIPPED | OUTPATIENT
Start: 2025-06-02 | End: 2025-07-02

## 2025-06-22 DIAGNOSIS — E78.00 HYPERCHOLESTEROLEMIA: ICD-10-CM

## 2025-06-22 DIAGNOSIS — K21.9 GASTROESOPHAGEAL REFLUX DISEASE WITHOUT ESOPHAGITIS: ICD-10-CM

## 2025-06-22 RX ORDER — PANTOPRAZOLE SODIUM 40 MG/1
40 TABLET, DELAYED RELEASE ORAL 2 TIMES DAILY
Qty: 60 TABLET | Refills: 5 | Status: SHIPPED | OUTPATIENT
Start: 2025-06-22

## 2025-06-22 RX ORDER — ROSUVASTATIN CALCIUM 10 MG/1
10 TABLET, COATED ORAL DAILY
Qty: 30 TABLET | Refills: 5 | Status: SHIPPED | OUTPATIENT
Start: 2025-06-22

## 2025-07-03 DIAGNOSIS — F41.9 ANXIETY: ICD-10-CM

## 2025-07-03 RX ORDER — ALPRAZOLAM 0.5 MG
0.5 TABLET ORAL
Qty: 30 TABLET | Refills: 0 | Status: SHIPPED | OUTPATIENT
Start: 2025-07-03 | End: 2025-08-02

## 2025-07-03 NOTE — TELEPHONE ENCOUNTER
Patient Id Prescription # Sold Filled Written Drug Label Qty Days Strength MME* Prescriber Pharmacy Payment REFILL #/Auth State Detail   1 [id number] 06/03/2025 06/02/2025 06/02/2025 ALPRAZolam (Tablet) 30.0 30 0.5 MG NA LATONIA [employer] Commercial Insurance 0 / 0 [place]    1 [id number] 04/29/2025 04/28/2025 04/28/2025 ALPRAZolam (Tablet) 30.0 30 0.5 MG NA MELISSA [employer] Commercial Insurance 0 / 0 [place]    1 [id number] 03/27/2025 03/24/2025 03/24/2025 ALPRAZolam (Tablet) 30.0 30 0.5 MG NA LATONIA [employer] Commercial Insurance 0 / 0 [place]    1 [id number] 02/25/2025 02/24/2025 02/24/2025 ALPRAZolam (Tablet) 30.0 30 0.5 MG NA MELISSA [employer] Commercial Insurance 0 / 0 [place]    1 [id number] 01/23/2025 01/21/2025 01/21/2025 ALPRAZolam (Tablet) 30.0 30 0.5 MG NA MELISSA [employer] Commercial Insurance 0 / 0 [place]    1 [id number] 12/26/2024 12/23/2024 12/23/2024 ALPRAZolam (Tablet) 30.0 30 0.5 MG NA ELSHOA BLACK-LOPEZ [employer] Commercial Insurance 0 / 0 [place]    1 [id number] 11/20/2024 11/19/2024 11/19/2024 ALPRAZolam (Tablet) 30.0 30 0.5 MG NA MELISSA [employer] Commercial Insurance 0 / 0 [place]    1 [id number] 10/18/2024 10/16/2024 10/16/2024 ALPRAZolam (Tablet) 30.0 30 0.5 MG NA MELISSA [employer] Commercial Insurance 0 / 0 [place]    1 [id number] 09/19/2024 09/18/2024 09/18/2024 ALPRAZolam (Tablet) 30.0 30 0.5 MG NA MELISSA [employer] Commercial Insurance 0 / 0 [place]    1 [id number] 08/20/2024 08/19/2024 08/19/2024 ALPRAZolam (Tablet) 30.0 30 0.5 MG NA LATONIA RAHMAN [employer] Commercial Insurance 0 / 0 [place]

## 2025-07-22 DIAGNOSIS — F32.A DEPRESSION, UNSPECIFIED DEPRESSION TYPE: ICD-10-CM

## 2025-07-23 RX ORDER — CITALOPRAM HYDROBROMIDE 40 MG/1
40 TABLET ORAL DAILY
Qty: 90 TABLET | Refills: 1 | Status: SHIPPED | OUTPATIENT
Start: 2025-07-23